# Patient Record
Sex: MALE | Race: WHITE | NOT HISPANIC OR LATINO | Employment: OTHER | URBAN - METROPOLITAN AREA
[De-identification: names, ages, dates, MRNs, and addresses within clinical notes are randomized per-mention and may not be internally consistent; named-entity substitution may affect disease eponyms.]

---

## 2017-05-02 ENCOUNTER — HOSPITAL ENCOUNTER (OUTPATIENT)
Dept: LAB | Facility: MEDICAL CENTER | Age: 62
End: 2017-05-02
Attending: FAMILY MEDICINE
Payer: COMMERCIAL

## 2017-05-02 LAB
25(OH)D3 SERPL-MCNC: 40 NG/ML (ref 30–100)
ALBUMIN SERPL BCP-MCNC: 4.3 G/DL (ref 3.2–4.9)
ALBUMIN/GLOB SERPL: 1.5 G/DL
ALP SERPL-CCNC: 51 U/L (ref 30–99)
ALT SERPL-CCNC: 41 U/L (ref 2–50)
ANION GAP SERPL CALC-SCNC: 8 MMOL/L (ref 0–11.9)
AST SERPL-CCNC: 31 U/L (ref 12–45)
BILIRUB SERPL-MCNC: 0.6 MG/DL (ref 0.1–1.5)
BUN SERPL-MCNC: 16 MG/DL (ref 8–22)
CALCIUM SERPL-MCNC: 9.3 MG/DL (ref 8.5–10.5)
CHLORIDE SERPL-SCNC: 111 MMOL/L (ref 96–112)
CHOLEST SERPL-MCNC: 152 MG/DL (ref 100–199)
CO2 SERPL-SCNC: 22 MMOL/L (ref 20–33)
CREAT SERPL-MCNC: 0.88 MG/DL (ref 0.5–1.4)
GFR SERPL CREATININE-BSD FRML MDRD: >60 ML/MIN/1.73 M 2
GLOBULIN SER CALC-MCNC: 2.8 G/DL (ref 1.9–3.5)
GLUCOSE SERPL-MCNC: 120 MG/DL (ref 65–99)
HDLC SERPL-MCNC: 38 MG/DL
LDLC SERPL CALC-MCNC: 96 MG/DL
POTASSIUM SERPL-SCNC: 3.7 MMOL/L (ref 3.6–5.5)
PROT SERPL-MCNC: 7.1 G/DL (ref 6–8.2)
PSA SERPL-MCNC: 0.45 NG/ML (ref 0–4)
SODIUM SERPL-SCNC: 141 MMOL/L (ref 135–145)
TRIGL SERPL-MCNC: 90 MG/DL (ref 0–149)
TSH SERPL DL<=0.005 MIU/L-ACNC: 7.3 UIU/ML (ref 0.3–3.7)

## 2017-05-02 PROCEDURE — 84443 ASSAY THYROID STIM HORMONE: CPT

## 2017-05-02 PROCEDURE — 36415 COLL VENOUS BLD VENIPUNCTURE: CPT

## 2017-05-02 PROCEDURE — 80061 LIPID PANEL: CPT

## 2017-05-02 PROCEDURE — 80053 COMPREHEN METABOLIC PANEL: CPT

## 2017-05-02 PROCEDURE — 82306 VITAMIN D 25 HYDROXY: CPT

## 2017-05-02 PROCEDURE — 84153 ASSAY OF PSA TOTAL: CPT

## 2017-06-06 ENCOUNTER — OFFICE VISIT (OUTPATIENT)
Dept: CARDIOLOGY | Facility: MEDICAL CENTER | Age: 62
End: 2017-06-06
Payer: COMMERCIAL

## 2017-06-06 VITALS
WEIGHT: 255 LBS | OXYGEN SATURATION: 93 % | HEIGHT: 70 IN | DIASTOLIC BLOOD PRESSURE: 80 MMHG | SYSTOLIC BLOOD PRESSURE: 120 MMHG | BODY MASS INDEX: 36.51 KG/M2 | HEART RATE: 70 BPM

## 2017-06-06 DIAGNOSIS — E88.810 DYSMETABOLIC SYNDROME X: ICD-10-CM

## 2017-06-06 DIAGNOSIS — E66.09 NON MORBID OBESITY DUE TO EXCESS CALORIES: ICD-10-CM

## 2017-06-06 DIAGNOSIS — E78.2 MIXED HYPERLIPIDEMIA: ICD-10-CM

## 2017-06-06 DIAGNOSIS — I48.0 PAF (PAROXYSMAL ATRIAL FIBRILLATION) (HCC): Primary | ICD-10-CM

## 2017-06-06 PROCEDURE — 99213 OFFICE O/P EST LOW 20 MIN: CPT | Performed by: INTERNAL MEDICINE

## 2017-06-06 RX ORDER — LEVOTHYROXINE SODIUM 112 UG/1
112 TABLET ORAL
COMMUNITY
End: 2018-07-20

## 2017-06-06 RX ORDER — VITAMIN E 268 MG
400 CAPSULE ORAL DAILY
Status: ON HOLD | COMMUNITY
End: 2022-12-27

## 2017-06-06 ASSESSMENT — ENCOUNTER SYMPTOMS: PALPITATIONS: 1

## 2017-06-06 NOTE — MR AVS SNAPSHOT
"        Michael Villarreal   2017 9:00 AM   Office Visit   MRN: [id number]    Department:  [facility]   Dept Phone:  [phone]    Description:  Male : 1955   Provider:  Salomon Mendez M.D.           Reason for Visit     Follow-Up           Allergies as of 2017     No Known Allergies      You were diagnosed with     PAF (paroxysmal atrial fibrillation) (CMS-HCC)   [257733]       Dysmetabolic syndrome X   [277.7.ICD-9-CM]         Vital Signs     Blood Pressure Pulse Height Weight Body Mass Index Oxygen Saturation    120/80 mmHg 70 1.778 m (5' 10\") 115.667 kg (255 lb) 36.59 kg/m2 93%    Smoking Status                   Former Smoker           Basic Information     Date Of Birth Sex Race Ethnicity Preferred Language    1955 Male White Non- English      Problem List              ICD-10-CM Priority Class Noted - Resolved    Hyperlipidemia E78.5   2013 - Present    Senile nuclear sclerosis H25.10   2015 - Present    Mechanical complication due to ocular lens prosthesis T85.29XA   2015 - Present    Dysmetabolic syndrome X E88.81   2015 - Present    PAF (paroxysmal atrial fibrillation) (CMS-HCC) I48.0   2016 - Present      Health Maintenance        Date Due Completion Dates    IMM DTaP/Tdap/Td Vaccine (1 - Tdap) 1974 ---    COLONOSCOPY 2005 ---    IMM ZOSTER VACCINE 2015 ---            Current Immunizations     No immunizations on file.      Below and/or attached are the medications your provider expects you to take. Review all of your home medications and newly ordered medications with your provider and/or pharmacist. Follow medication instructions as directed by your provider and/or pharmacist. Please keep your medication list with you and share with your provider. Update the information when medications are discontinued, doses are changed, or new medications (including over-the-counter products) are added; and carry medication information at all times " in the event of emergency situations     Allergies:  No Known Allergies          Medications  Valid as of: June 06, 2017 -  9:19 AM    Generic Name Brand Name Tablet Size Instructions for use    Aspirin (Tab) aspirin 81 MG Take 81 mg by mouth every day.        B Complex-C   Take 1 Tab by mouth every day.        Cholecalciferol (Tab) cholecalciferol 1000 UNIT Take 1,000 Units by mouth every day.        Coconut Oil   Take  by mouth.        Coenzyme Q10 (Cap) Co Q 10 100 MG Take 1 Tab by mouth every day.        Fenofibrate Micronized (Cap) ANTARA 130 MG         Levothyroxine Sodium (Tab) SYNTHROID 50 MCG TAKE ONE TABLET BY MOUTH DAILY        Levothyroxine Sodium (Tab) SYNTHROID 112 MCG Take 112 mcg by mouth Every morning on an empty stomach.        Magnesium Gluconate (Tab) MAG-G 500 MG Take 500 mg by mouth every day.        Metoprolol Tartrate (Tab) LOPRESSOR 25 MG TAKE 1 TABLET BY MOUTH 2 TIMES A DAY        Potassium Gluconate (Tab) Potassium Gluconate 595 (99 K) MG Take  by mouth every day.        Pravastatin Sodium (Tab) PRAVACHOL 40 MG Take 40 mg by mouth every evening.        Propafenone HCl (Tab) RYTHMOL 300 MG Take 1 Tab by mouth every 8 hours.        Vitamin E (Cap) VITAMIN E 400 UNIT Take 400 Units by mouth every day.        .                 Medicines prescribed today were sent to:     [id number] - [place] - [address]    [address] [place] [address]    Phone: [phone] Fax: [phone]    Open 24 Hours?: No      Medication refill instructions:       If your prescription bottle indicates you have medication refills left, it is not necessary to call your provider’s office. Please contact your pharmacy and they will refill your medication.    If your prescription bottle indicates you do not have any refills left, you may request refills at any time through one of the following ways: The online [employer] system (except Urgent Care), by calling your provider’s office, or by asking your  pharmacy to contact your provider’s office with a refill request. Medication refills are processed only during regular business hours and may not be available until the next business day. Your provider may request additional information or to have a follow-up visit with you prior to refilling your medication.   *Please Note: Medication refills are assigned a new Rx number when refilled electronically. Your pharmacy may indicate that no refills were authorized even though a new prescription for the same medication is available at the pharmacy. Please request the medicine by name with the pharmacy before contacting your provider for a refill.           [employer] Access Code: Activation code not generated  Current SeeMore Interactive Status: Active

## 2017-06-06 NOTE — PROGRESS NOTES
Subjective:   Michael Villarreal is a 62 -year-old man with a history of paroxysmal atrial fibrillation, obesity, hypertension, and dyslipidemia.     He is doing very well today, and has no cardiovascular complaints. He tells me that he has had to take about 2 pills of Rythmol in the last year or so for palpitations. He has no chest discomfort whatsoever with moderately vigorous physical activity that he does on a semi-regular basis. He is doing well with his medical regimen.    He tells me as far as diet goes that he has switched to a diet called the keto, which is similar to the Atkins diet.    He comes in today in the recent aftermath of the passing of his mother-in-law who had been suffering in the ICU for about 30 days. He seems at peace with that.    Past Medical History   Diagnosis Date   • Hyperlipidemia    • Unspecified cataract    • High cholesterol    • Atrial fibrillation (CMS-HCC)      approx 1yr ago-     Past Surgical History   Procedure Laterality Date   • Dupuytren contracture release  7/17/2012     Performed by RAFAEL CALVILLO at SURGERY [facility] ORS   • Hand surgery     • Cataract phaco with iol  4/1/2015     Performed by Jakob Garcia M.D. at SURGERY SAME DAY [facility] ORS   • Cataract phaco with iol  4/20/2015     Performed by Jakob Garcia M.D. at SURGERY [facility] ORS     Family History   Problem Relation Age of Onset   • Heart Disease Neg Hx    • Other Mother 68     kidney CA     History   Smoking status   • Former Smoker -- 5 years   • Types: Cigarettes   • Quit date: 07/17/1984   Smokeless tobacco   • Former User   • Types: Chew   • Quit date: 07/17/1984     No Known Allergies  Outpatient Encounter Prescriptions as of 6/6/2017   Medication Sig Dispense Refill   • levothyroxine (SYNTHROID) 112 MCG Tab Take 112 mcg by mouth Every morning on an empty stomach.     • COCONUT OIL PO Take  by mouth.     • vitamin e (VITAMIN E) 400 UNIT Cap Take 400 Units by mouth every day.     •  "metoprolol (LOPRESSOR) 25 MG Tab TAKE 1 TABLET BY MOUTH 2 TIMES A  Tab 3   • propafenone (RYTHMOL) 300 MG tablet Take 1 Tab by mouth every 8 hours. 10 Tab 11   • fenofibrate (ANTARA) 130 MG capsule   0   • pravastatin (PRAVACHOL) 40 MG tablet Take 40 mg by mouth every evening.     • Coenzyme Q10 (CO Q 10) 100 MG CAPS Take 1 Tab by mouth every day.     • B Complex-C (SUPER B COMPLEX PO) Take 1 Tab by mouth every day.     • aspirin 81 MG tablet Take 81 mg by mouth every day.     • vitamin D (CHOLECALCIFEROL) 1000 UNIT TABS Take 1,000 Units by mouth every day.     • levothyroxine (SYNTHROID) 50 MCG Tab TAKE ONE TABLET BY MOUTH DAILY  0   • Potassium Gluconate (HM POTASSIUM) 595 MG TABS Take  by mouth every day.     • magnesium gluconate (MAG-G) 500 MG tablet Take 500 mg by mouth every day.       No facility-administered encounter medications on file as of 6/6/2017.     Review of Systems   Cardiovascular: Positive for palpitations (2 episodes in the last year).   All other systems reviewed and are negative.       Objective:   /80 mmHg  Pulse 70  Ht 1.778 m (5' 10\")  Wt 115.667 kg (255 lb)  BMI 36.59 kg/m2  SpO2 93%    Physical Exam   Constitutional: He is oriented to person, place, and time. He appears well-developed and well-nourished. No distress.   Pleasant, middle-age man in no distress   HENT:   Head: Normocephalic and atraumatic.   Eyes: Conjunctivae and EOM are normal. Pupils are equal, round, and reactive to light. No scleral icterus.   Neck: Neck supple. No JVD present. No tracheal deviation present.   Cardiovascular: Normal rate, regular rhythm, normal heart sounds and intact distal pulses.  Exam reveals no gallop and no friction rub.    No murmur heard.  Pulses:       Dorsalis pedis pulses are 2+ on the right side, and 2+ on the left side.   No carotid bruits   Pulmonary/Chest: Effort normal and breath sounds normal. No stridor. No respiratory distress. He has no wheezes. He has no rales. "   Abdominal: Soft. Bowel sounds are normal. He exhibits no distension.   Musculoskeletal: He exhibits no edema.   Neurological: He is alert and oriented to person, place, and time.   Skin: Skin is warm and dry. No rash noted. He is not diaphoretic. No erythema. No pallor.   Psychiatric: He has a normal mood and affect. Judgment and thought content normal.   Vitals reviewed.       5/2/2017 06:13   Cholesterol,Tot 152   Triglycerides 90   HDL 38 (A)   LDL 96         Assessment:     1. PAF (paroxysmal atrial fibrillation) (CMS-HCC)     2. Dysmetabolic syndrome X     3. Mixed hyperlipidemia     4. Non morbid obesity due to excess calories         Medical Decision Making:  Today's Assessment / Status / Plan:     Medical Decision Making:    He is doing very well today with really no cardiovascular complaints. His blood pressure is wonderfully controlled as usual on metoprolol. I reviewed with him his lipids, and I would not change his lipid regimen resolutely. He requires very infrequent Rythmol for paroxysmal atrial fibrillation. We discussed diet, and exercise. I again reviewed with him stroke risk with atrial fibrillation, and we have opted for aspirin at this time.    Salomon Mendez MD  Cardiologist, [facility]

## 2017-06-06 NOTE — Clinical Note
Name:          Michael Villarreal   YOB: 1955  Date:     6/6/2017      Robosn Ramos, PRECIOUS.  [address]  [place] [address]     Salomon Mendez MD  [address]  [place] [address]  Phone: [phone]  Back Line: [phone]  Fax: [phone]  E-mail: [email or web address]   Dear Dr. Ramos,    We had the pleasure of seeing your patient, Michael Villarreal, in Cardiology Clinic at [facility] today.    As you know, he is a 62-year-old man with a history of paroxysmal atrial fibrillation, obesity, hypertension, and dyslipidemia.    He is doing very well today with really no cardiovascular complaints. His blood pressure is wonderfully controlled as usual on metoprolol. I reviewed with him his lipids, and I would not change his lipid regimen resolutely. He requires very infrequent Rythmol for paroxysmal atrial fibrillation. We discussed diet, and exercise. I again reviewed with him stroke risk with atrial fibrillation, and we have opted for aspirin at this time.    We will have the patient follow-up in one year.    Thank you for the referral and please do not hesitate to contact me at any time. My contact information is listed above.    This note was dictated using Dragon speech recognition software.     A full note including my physical examination and a full list of rectified medications is available in our medical record, and can be faxed as well.    Salomon Mendez MD  Cardiologist  [facility]

## 2017-06-22 DIAGNOSIS — I10 ESSENTIAL HYPERTENSION: ICD-10-CM

## 2017-07-27 ENCOUNTER — HOSPITAL ENCOUNTER (OUTPATIENT)
Dept: LAB | Facility: MEDICAL CENTER | Age: 62
End: 2017-07-27
Attending: FAMILY MEDICINE
Payer: COMMERCIAL

## 2017-07-27 ENCOUNTER — HOSPITAL ENCOUNTER (OUTPATIENT)
Dept: LAB | Facility: MEDICAL CENTER | Age: 62
End: 2017-07-27
Payer: COMMERCIAL

## 2017-07-27 LAB
BDY FAT % MEASURED: 35.5 %
BP DIAS: 80 MMHG
BP SYS: 120 MMHG
CHOLEST SERPL-MCNC: 155 MG/DL (ref 100–199)
DIABETES HTDIA: NO
EVENT NAME HTEVT: NORMAL
FASTING HTFAS: YES
GLUCOSE SERPL-MCNC: 106 MG/DL (ref 65–99)
HDLC SERPL-MCNC: 39 MG/DL
HYPERTENSION HTHYP: NO
LDLC SERPL CALC-MCNC: 96 MG/DL
SCREENING LOC CITY HTCIT: NORMAL
SCREENING LOC STATE HTSTA: NORMAL
SCREENING LOCATION HTLOC: NORMAL
SMOKING HTSMO: NO
SUBSCRIBER ID HTSID: NORMAL
TRIGL SERPL-MCNC: 101 MG/DL (ref 0–149)
TSH SERPL DL<=0.005 MIU/L-ACNC: 1.99 UIU/ML (ref 0.3–3.7)

## 2017-07-27 PROCEDURE — 84443 ASSAY THYROID STIM HORMONE: CPT

## 2017-07-27 PROCEDURE — 80061 LIPID PANEL: CPT

## 2017-07-27 PROCEDURE — S5190 WELLNESS ASSESSMENT BY NONPH: HCPCS

## 2017-07-27 PROCEDURE — 82947 ASSAY GLUCOSE BLOOD QUANT: CPT

## 2017-07-27 PROCEDURE — 36415 COLL VENOUS BLD VENIPUNCTURE: CPT

## 2017-08-27 DIAGNOSIS — I48.0 PAROXYSMAL ATRIAL FIBRILLATION (HCC): ICD-10-CM

## 2017-08-29 RX ORDER — PROPAFENONE HYDROCHLORIDE 300 MG/1
300 TABLET, COATED ORAL EVERY 8 HOURS
Qty: 270 TAB | Refills: 3 | Status: SHIPPED | OUTPATIENT
Start: 2017-08-29 | End: 2019-01-18 | Stop reason: SDUPTHER

## 2017-10-13 ENCOUNTER — HOSPITAL ENCOUNTER (OUTPATIENT)
Dept: LAB | Facility: MEDICAL CENTER | Age: 62
End: 2017-10-13
Attending: FAMILY MEDICINE
Payer: COMMERCIAL

## 2017-10-13 LAB — GFR SERPL CREATININE-BSD FRML MDRD: >60 ML/MIN/1.73 M 2

## 2017-10-13 PROCEDURE — 80061 LIPID PANEL: CPT

## 2017-10-13 PROCEDURE — 36415 COLL VENOUS BLD VENIPUNCTURE: CPT

## 2017-10-13 PROCEDURE — 80053 COMPREHEN METABOLIC PANEL: CPT

## 2017-10-13 PROCEDURE — 82306 VITAMIN D 25 HYDROXY: CPT

## 2017-10-13 PROCEDURE — 84443 ASSAY THYROID STIM HORMONE: CPT

## 2017-10-13 PROCEDURE — 84153 ASSAY OF PSA TOTAL: CPT

## 2017-10-13 PROCEDURE — 85025 COMPLETE CBC W/AUTO DIFF WBC: CPT

## 2017-10-14 LAB
25(OH)D3 SERPL-MCNC: 35 NG/ML (ref 30–100)
ALBUMIN SERPL BCP-MCNC: 4.4 G/DL (ref 3.2–4.9)
ALBUMIN/GLOB SERPL: 1.7 G/DL
ALP SERPL-CCNC: 46 U/L (ref 30–99)
ALT SERPL-CCNC: 29 U/L (ref 2–50)
ANION GAP SERPL CALC-SCNC: 7 MMOL/L (ref 0–11.9)
AST SERPL-CCNC: 25 U/L (ref 12–45)
BASOPHILS # BLD AUTO: 1.5 % (ref 0–1.8)
BASOPHILS # BLD: 0.09 K/UL (ref 0–0.12)
BILIRUB SERPL-MCNC: 0.6 MG/DL (ref 0.1–1.5)
BUN SERPL-MCNC: 15 MG/DL (ref 8–22)
CALCIUM SERPL-MCNC: 9.7 MG/DL (ref 8.5–10.5)
CHLORIDE SERPL-SCNC: 109 MMOL/L (ref 96–112)
CHOLEST SERPL-MCNC: 155 MG/DL (ref 100–199)
CO2 SERPL-SCNC: 27 MMOL/L (ref 20–33)
CREAT SERPL-MCNC: 0.82 MG/DL (ref 0.5–1.4)
EOSINOPHIL # BLD AUTO: 0.13 K/UL (ref 0–0.51)
EOSINOPHIL NFR BLD: 2.2 % (ref 0–6.9)
ERYTHROCYTE [DISTWIDTH] IN BLOOD BY AUTOMATED COUNT: 43.3 FL (ref 35.9–50)
GLOBULIN SER CALC-MCNC: 2.6 G/DL (ref 1.9–3.5)
GLUCOSE SERPL-MCNC: 103 MG/DL (ref 65–99)
HCT VFR BLD AUTO: 47.4 % (ref 42–52)
HDLC SERPL-MCNC: 42 MG/DL
HGB BLD-MCNC: 16.1 G/DL (ref 14–18)
IMM GRANULOCYTES # BLD AUTO: 0.07 K/UL (ref 0–0.11)
IMM GRANULOCYTES NFR BLD AUTO: 1.2 % (ref 0–0.9)
LDLC SERPL CALC-MCNC: 91 MG/DL
LYMPHOCYTES # BLD AUTO: 1.8 K/UL (ref 1–4.8)
LYMPHOCYTES NFR BLD: 30.3 % (ref 22–41)
MCH RBC QN AUTO: 30.9 PG (ref 27–33)
MCHC RBC AUTO-ENTMCNC: 34 G/DL (ref 33.7–35.3)
MCV RBC AUTO: 91 FL (ref 81.4–97.8)
MONOCYTES # BLD AUTO: 0.44 K/UL (ref 0–0.85)
MONOCYTES NFR BLD AUTO: 7.4 % (ref 0–13.4)
NEUTROPHILS # BLD AUTO: 3.42 K/UL (ref 1.82–7.42)
NEUTROPHILS NFR BLD: 57.4 % (ref 44–72)
NRBC # BLD AUTO: 0 K/UL
NRBC BLD AUTO-RTO: 0 /100 WBC
PLATELET # BLD AUTO: 179 K/UL (ref 164–446)
PMV BLD AUTO: 13.2 FL (ref 9–12.9)
POTASSIUM SERPL-SCNC: 3.6 MMOL/L (ref 3.6–5.5)
PROT SERPL-MCNC: 7 G/DL (ref 6–8.2)
PSA SERPL-MCNC: 0.59 NG/ML (ref 0–4)
RBC # BLD AUTO: 5.21 M/UL (ref 4.7–6.1)
SODIUM SERPL-SCNC: 143 MMOL/L (ref 135–145)
TRIGL SERPL-MCNC: 109 MG/DL (ref 0–149)
TSH SERPL DL<=0.005 MIU/L-ACNC: 4.03 UIU/ML (ref 0.3–3.7)
WBC # BLD AUTO: 6 K/UL (ref 4.8–10.8)

## 2018-02-13 ENCOUNTER — HOSPITAL ENCOUNTER (OUTPATIENT)
Dept: LAB | Facility: MEDICAL CENTER | Age: 63
End: 2018-02-13
Attending: FAMILY MEDICINE
Payer: COMMERCIAL

## 2018-02-13 LAB
EST. AVERAGE GLUCOSE BLD GHB EST-MCNC: 111 MG/DL
HBA1C MFR BLD: 5.5 % (ref 0–5.6)
TSH SERPL DL<=0.005 MIU/L-ACNC: 2.46 UIU/ML (ref 0.38–5.33)

## 2018-02-13 PROCEDURE — 83036 HEMOGLOBIN GLYCOSYLATED A1C: CPT

## 2018-02-13 PROCEDURE — 36415 COLL VENOUS BLD VENIPUNCTURE: CPT

## 2018-02-13 PROCEDURE — 84443 ASSAY THYROID STIM HORMONE: CPT

## 2018-02-13 PROCEDURE — 80048 BASIC METABOLIC PNL TOTAL CA: CPT

## 2018-02-14 LAB
ANION GAP SERPL CALC-SCNC: 9 MMOL/L (ref 0–11.9)
BUN SERPL-MCNC: 19 MG/DL (ref 8–22)
CALCIUM SERPL-MCNC: 9.4 MG/DL (ref 8.5–10.5)
CHLORIDE SERPL-SCNC: 107 MMOL/L (ref 96–112)
CO2 SERPL-SCNC: 26 MMOL/L (ref 20–33)
CREAT SERPL-MCNC: 0.86 MG/DL (ref 0.5–1.4)
GLUCOSE SERPL-MCNC: 93 MG/DL (ref 65–99)
POTASSIUM SERPL-SCNC: 3.6 MMOL/L (ref 3.6–5.5)
SODIUM SERPL-SCNC: 142 MMOL/L (ref 135–145)

## 2018-05-25 ENCOUNTER — HOSPITAL ENCOUNTER (OUTPATIENT)
Dept: LAB | Facility: MEDICAL CENTER | Age: 63
End: 2018-05-25
Attending: FAMILY MEDICINE
Payer: COMMERCIAL

## 2018-05-25 LAB
ALBUMIN SERPL BCP-MCNC: 4.2 G/DL (ref 3.2–4.9)
ALBUMIN/GLOB SERPL: 1.4 G/DL
ALP SERPL-CCNC: 44 U/L (ref 30–99)
ALT SERPL-CCNC: 31 U/L (ref 2–50)
ANION GAP SERPL CALC-SCNC: 7 MMOL/L (ref 0–11.9)
AST SERPL-CCNC: 24 U/L (ref 12–45)
BILIRUB SERPL-MCNC: 0.6 MG/DL (ref 0.1–1.5)
BUN SERPL-MCNC: 20 MG/DL (ref 8–22)
CALCIUM SERPL-MCNC: 9.5 MG/DL (ref 8.5–10.5)
CHLORIDE SERPL-SCNC: 108 MMOL/L (ref 96–112)
CHOLEST SERPL-MCNC: 154 MG/DL (ref 100–199)
CO2 SERPL-SCNC: 26 MMOL/L (ref 20–33)
CREAT SERPL-MCNC: 0.81 MG/DL (ref 0.5–1.4)
EST. AVERAGE GLUCOSE BLD GHB EST-MCNC: 117 MG/DL
GLOBULIN SER CALC-MCNC: 2.9 G/DL (ref 1.9–3.5)
GLUCOSE SERPL-MCNC: 104 MG/DL (ref 65–99)
HBA1C MFR BLD: 5.7 % (ref 0–5.6)
HDLC SERPL-MCNC: 37 MG/DL
LDLC SERPL CALC-MCNC: 79 MG/DL
POTASSIUM SERPL-SCNC: 3.9 MMOL/L (ref 3.6–5.5)
PROT SERPL-MCNC: 7.1 G/DL (ref 6–8.2)
SODIUM SERPL-SCNC: 141 MMOL/L (ref 135–145)
TRIGL SERPL-MCNC: 190 MG/DL (ref 0–149)
TSH SERPL DL<=0.005 MIU/L-ACNC: 2.3 UIU/ML (ref 0.38–5.33)

## 2018-05-25 PROCEDURE — 80053 COMPREHEN METABOLIC PANEL: CPT

## 2018-05-25 PROCEDURE — 36415 COLL VENOUS BLD VENIPUNCTURE: CPT

## 2018-05-25 PROCEDURE — 80061 LIPID PANEL: CPT

## 2018-05-25 PROCEDURE — 84443 ASSAY THYROID STIM HORMONE: CPT

## 2018-05-25 PROCEDURE — 83036 HEMOGLOBIN GLYCOSYLATED A1C: CPT

## 2018-07-01 DIAGNOSIS — I10 ESSENTIAL HYPERTENSION: ICD-10-CM

## 2018-07-20 ENCOUNTER — OFFICE VISIT (OUTPATIENT)
Dept: CARDIOLOGY | Facility: MEDICAL CENTER | Age: 63
End: 2018-07-20
Payer: COMMERCIAL

## 2018-07-20 VITALS
HEIGHT: 70 IN | DIASTOLIC BLOOD PRESSURE: 72 MMHG | WEIGHT: 272 LBS | OXYGEN SATURATION: 93 % | SYSTOLIC BLOOD PRESSURE: 124 MMHG | HEART RATE: 70 BPM | BODY MASS INDEX: 38.94 KG/M2

## 2018-07-20 DIAGNOSIS — I48.0 PAROXYSMAL ATRIAL FIBRILLATION (HCC): ICD-10-CM

## 2018-07-20 DIAGNOSIS — R07.89 OTHER CHEST PAIN: ICD-10-CM

## 2018-07-20 DIAGNOSIS — E78.2 MIXED HYPERLIPIDEMIA: ICD-10-CM

## 2018-07-20 DIAGNOSIS — E88.810 DYSMETABOLIC SYNDROME X: ICD-10-CM

## 2018-07-20 DIAGNOSIS — E66.09 NON MORBID OBESITY DUE TO EXCESS CALORIES: ICD-10-CM

## 2018-07-20 DIAGNOSIS — I48.0 PAF (PAROXYSMAL ATRIAL FIBRILLATION) (HCC): ICD-10-CM

## 2018-07-20 DIAGNOSIS — I10 ESSENTIAL HYPERTENSION: ICD-10-CM

## 2018-07-20 PROCEDURE — 99214 OFFICE O/P EST MOD 30 MIN: CPT | Performed by: NURSE PRACTITIONER

## 2018-07-20 RX ORDER — LEVOTHYROXINE SODIUM 0.15 MG/1
TABLET ORAL
COMMUNITY
Start: 2018-07-19 | End: 2022-06-09

## 2018-07-20 ASSESSMENT — ENCOUNTER SYMPTOMS
ABDOMINAL PAIN: 0
CLAUDICATION: 0
FEVER: 0
PND: 0
MYALGIAS: 0
COUGH: 0
SHORTNESS OF BREATH: 0
ORTHOPNEA: 0
PALPITATIONS: 0

## 2018-07-20 NOTE — LETTER
[facility]   [address]  [place] [address]  Phone: [phone]  Fax: [phone]              Michael Villarreal  1955    Encounter Date: 7/20/2018    PATRICIA Morfin          PROGRESS NOTE:  Chief Complaint   Patient presents with   • Atrial Fibrillation     Subjective:   Michael Villarreal is a 63 y.o. male who presents today for follow up on chest tightness.    He is a patient of Dr. Salomon Mendez in our office. Hx of PAF, HLD, HTN, obesity, and now new onset of chest tightness.    He tells me that since his last apt, he has had about a dozen times of chest tightness that is central with radiation to his neck, more at rest.     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     He is overweight. He states he eats healthy.    He has mild LE edema.    Past Medical History:   Diagnosis Date   • Atrial fibrillation (HCC)     approx 1yr ago-   • High cholesterol    • Hyperlipidemia    • Hypertension    • Obesity    • Unspecified cataract      Past Surgical History:   Procedure Laterality Date   • CATARACT PHACO WITH IOL  4/20/2015    Performed by Jakob Garcia M.D. at SURGERY [facility] ORS   • CATARACT PHACO WITH IOL  4/1/2015    Performed by Jakob Garcia M.D. at SURGERY SAME DAY [facility] ORS   • DUPUYTREN CONTRACTURE RELEASE  7/17/2012    Performed by RAFAEL CALVILLO at SURGERY [facility] ORS   • HAND SURGERY       Family History   Problem Relation Age of Onset   • Other Mother 68     kidney CA   • Heart Disease Neg Hx      Social History     Social History   • Marital status:      Spouse name: N/A   • Number of children: N/A   • Years of education: N/A     Occupational History   • Not on file.     Social History Main Topics   • Smoking status: Former Smoker     Years: 5.00     Types: Cigarettes     Quit date: 7/17/1984   • Smokeless tobacco: Former User     Types: Chew     Quit date: 7/17/1984   •  Alcohol use Yes      Comment: Occasionally   • Drug use: No   • Sexual activity: Not on file     Other Topics Concern   • Not on file     Social History Narrative   • No narrative on file     No Known Allergies  Outpatient Encounter Prescriptions as of 7/20/2018   Medication Sig Dispense Refill   • levothyroxine (SYNTHROID) 150 MCG Tab      • apixaban (ELIQUIS) 5mg Tab Take 1 Tab by mouth 2 Times a Day. 60 Tab 11   • metoprolol (LOPRESSOR) 25 MG Tab Take 1 Tab by mouth 2 times a day. 180 Tab 3   • propafenone (RYTHMOL) 300 MG tablet TAKE 1 TAB BY MOUTH EVERY 8 HOURS. 270 Tab 3   • COCONUT OIL PO Take  by mouth.     • vitamin e (VITAMIN E) 400 UNIT Cap Take 400 Units by mouth every day.     • fenofibrate (ANTARA) 130 MG capsule   0   • pravastatin (PRAVACHOL) 40 MG tablet Take 40 mg by mouth every evening.     • Coenzyme Q10 (CO Q 10) 100 MG CAPS Take 1 Tab by mouth every day.     • B Complex-C (SUPER B COMPLEX PO) Take 1 Tab by mouth every day.     • vitamin D (CHOLECALCIFEROL) 1000 UNIT TABS Take 1,000 Units by mouth every day.     • [DISCONTINUED] metoprolol (LOPRESSOR) 25 MG Tab TAKE 1 TAB BY MOUTH 2 TIMES A DAY. 180 Tab 0   • [DISCONTINUED] levothyroxine (SYNTHROID) 112 MCG Tab Take 112 mcg by mouth Every morning on an empty stomach.     • [DISCONTINUED] levothyroxine (SYNTHROID) 50 MCG Tab TAKE ONE TABLET BY MOUTH DAILY  0   • [DISCONTINUED] Potassium Gluconate (HM POTASSIUM) 595 MG TABS Take  by mouth every day.     • [DISCONTINUED] magnesium gluconate (MAG-G) 500 MG tablet Take 500 mg by mouth every day.     • [DISCONTINUED] aspirin 81 MG tablet Take 81 mg by mouth every day.       No facility-administered encounter medications on file as of 7/20/2018.      Review of Systems   Constitutional: Negative for fever and malaise/fatigue.   Respiratory: Negative for cough and shortness of breath.    Cardiovascular: Positive for chest pain. Negative for palpitations, orthopnea, claudication, leg swelling and PND.      "   Chest tightness that radiates into neck   Gastrointestinal: Negative for abdominal pain.   Musculoskeletal: Negative for myalgias.        Objective:   /72   Pulse 70   Ht 1.778 m (5' 10\")   Wt 123.4 kg (272 lb)   SpO2 93%   BMI 39.03 kg/m²      Physical Exam   Constitutional: He appears well-developed and well-nourished.   obese   HENT:   Head: Normocephalic and atraumatic.   Eyes: EOM are normal.   Neck: No JVD present.   Cardiovascular: Normal rate, regular rhythm, normal heart sounds and intact distal pulses.    Pulmonary/Chest: Effort normal and breath sounds normal.   Musculoskeletal: Normal range of motion. He exhibits no edema.   Skin: Skin is warm and dry.   Psychiatric: He has a normal mood and affect.   Nursing note and vitals reviewed.      Assessment:     1. Dysmetabolic syndrome X  NM-CARDIAC PET   2. Mixed hyperlipidemia  NM-CARDIAC PET   3. Non morbid obesity due to excess calories  NM-CARDIAC PET   4. PAF (paroxysmal atrial fibrillation) (HCC)     5. Other chest pain  NM-CARDIAC PET   6. Paroxysmal atrial fibrillation (HCC)     7. Essential hypertension  metoprolol (LOPRESSOR) 25 MG Tab     Medical Decision Making:  Today's Assessment / Status / Plan:     1. Chest tightness   -check PET for ischemia, concern for multiple risk factors for CAD  -cont cholesterol management with PCP  -call with results    2. HLD  -statin and fibrate  -LDL goal <100    3. HTN  -good control on metoprolol only    4. PAF  -concern for chest tightness related to afib  -rythmol only PRN for palpitations, he hasn't taken this  -cont metoprolol and eliquis  -tolerating eliquis with no bleeding    5. Obesity with hyperglycemia  -recommended lifestyle changes with more exercise and watching his calories, he agrees  -no resources wanted at this time    FU in clinic in 4 months with IA with review of symptoms, call for worsening symptoms    Patient verbalizes understanding and agrees with the plan of care.     "     Collaborating MD: Eden LARA        No Recipients

## 2018-07-20 NOTE — PROGRESS NOTES
Chief Complaint   Patient presents with   • Atrial Fibrillation     Subjective:   Michael Villarreal is a 63 y.o. male who presents today for follow up on chest tightness.    He is a patient of Dr. Salomon Mendez in our office. Hx of PAF, HLD, HTN, obesity, and now new onset of chest tightness.    He tells me that since his last apt, he has had about a dozen times of chest tightness that is central with radiation to his neck, more at rest.     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     He is overweight. He states he eats healthy.    He has mild LE edema.    Past Medical History:   Diagnosis Date   • Atrial fibrillation (HCC)     approx 1yr ago-   • High cholesterol    • Hyperlipidemia    • Hypertension    • Obesity    • Unspecified cataract      Past Surgical History:   Procedure Laterality Date   • CATARACT PHACO WITH IOL  4/20/2015    Performed by Jakob Garcia M.D. at SURGERY [facility] ORS   • CATARACT PHACO WITH IOL  4/1/2015    Performed by Jakob Garcia M.D. at SURGERY SAME DAY [facility] ORS   • DUPUYTREN CONTRACTURE RELEASE  7/17/2012    Performed by RAFAEL CALVILLO at SURGERY [facility] ORS   • HAND SURGERY       Family History   Problem Relation Age of Onset   • Other Mother 68     kidney CA   • Heart Disease Neg Hx      Social History     Social History   • Marital status:      Spouse name: N/A   • Number of children: N/A   • Years of education: N/A     Occupational History   • Not on file.     Social History Main Topics   • Smoking status: Former Smoker     Years: 5.00     Types: Cigarettes     Quit date: 7/17/1984   • Smokeless tobacco: Former User     Types: Chew     Quit date: 7/17/1984   • Alcohol use Yes      Comment: Occasionally   • Drug use: No   • Sexual activity: Not on file     Other Topics Concern   • Not on file     Social History Narrative   • No narrative on file     No Known Allergies  Outpatient Encounter Prescriptions as of 7/20/2018   Medication  "Sig Dispense Refill   • levothyroxine (SYNTHROID) 150 MCG Tab      • apixaban (ELIQUIS) 5mg Tab Take 1 Tab by mouth 2 Times a Day. 60 Tab 11   • metoprolol (LOPRESSOR) 25 MG Tab Take 1 Tab by mouth 2 times a day. 180 Tab 3   • propafenone (RYTHMOL) 300 MG tablet TAKE 1 TAB BY MOUTH EVERY 8 HOURS. 270 Tab 3   • COCONUT OIL PO Take  by mouth.     • vitamin e (VITAMIN E) 400 UNIT Cap Take 400 Units by mouth every day.     • fenofibrate (ANTARA) 130 MG capsule   0   • pravastatin (PRAVACHOL) 40 MG tablet Take 40 mg by mouth every evening.     • Coenzyme Q10 (CO Q 10) 100 MG CAPS Take 1 Tab by mouth every day.     • B Complex-C (SUPER B COMPLEX PO) Take 1 Tab by mouth every day.     • vitamin D (CHOLECALCIFEROL) 1000 UNIT TABS Take 1,000 Units by mouth every day.     • [DISCONTINUED] metoprolol (LOPRESSOR) 25 MG Tab TAKE 1 TAB BY MOUTH 2 TIMES A DAY. 180 Tab 0   • [DISCONTINUED] levothyroxine (SYNTHROID) 112 MCG Tab Take 112 mcg by mouth Every morning on an empty stomach.     • [DISCONTINUED] levothyroxine (SYNTHROID) 50 MCG Tab TAKE ONE TABLET BY MOUTH DAILY  0   • [DISCONTINUED] Potassium Gluconate (HM POTASSIUM) 595 MG TABS Take  by mouth every day.     • [DISCONTINUED] magnesium gluconate (MAG-G) 500 MG tablet Take 500 mg by mouth every day.     • [DISCONTINUED] aspirin 81 MG tablet Take 81 mg by mouth every day.       No facility-administered encounter medications on file as of 7/20/2018.      Review of Systems   Constitutional: Negative for fever and malaise/fatigue.   Respiratory: Negative for cough and shortness of breath.    Cardiovascular: Positive for chest pain. Negative for palpitations, orthopnea, claudication, leg swelling and PND.        Chest tightness that radiates into neck   Gastrointestinal: Negative for abdominal pain.   Musculoskeletal: Negative for myalgias.        Objective:   /72   Pulse 70   Ht 1.778 m (5' 10\")   Wt 123.4 kg (272 lb)   SpO2 93%   BMI 39.03 kg/m²     Physical Exam "   Constitutional: He appears well-developed and well-nourished.   obese   HENT:   Head: Normocephalic and atraumatic.   Eyes: EOM are normal.   Neck: No JVD present.   Cardiovascular: Normal rate, regular rhythm, normal heart sounds and intact distal pulses.    Pulmonary/Chest: Effort normal and breath sounds normal.   Musculoskeletal: Normal range of motion. He exhibits no edema.   Skin: Skin is warm and dry.   Psychiatric: He has a normal mood and affect.   Nursing note and vitals reviewed.      Assessment:     1. Dysmetabolic syndrome X  NM-CARDIAC PET   2. Mixed hyperlipidemia  NM-CARDIAC PET   3. Non morbid obesity due to excess calories  NM-CARDIAC PET   4. PAF (paroxysmal atrial fibrillation) (HCC)     5. Other chest pain  NM-CARDIAC PET   6. Paroxysmal atrial fibrillation (HCC)     7. Essential hypertension  metoprolol (LOPRESSOR) 25 MG Tab     Medical Decision Making:  Today's Assessment / Status / Plan:     1. Chest tightness   -check PET for ischemia, concern for multiple risk factors for CAD  -cont cholesterol management with PCP  -call with results    2. HLD  -statin and fibrate  -LDL goal <100    3. HTN  -good control on metoprolol only    4. PAF  -concern for chest tightness related to afib  -rythmol only PRN for palpitations, he hasn't taken this  -cont metoprolol and eliquis  -tolerating eliquis with no bleeding    5. Obesity with hyperglycemia  -recommended lifestyle changes with more exercise and watching his calories, he agrees  -no resources wanted at this time    FU in clinic in 4 months with IA with review of symptoms, call for worsening symptoms    Patient verbalizes understanding and agrees with the plan of care.     Collaborating MD: Eden LARA

## 2018-08-03 ENCOUNTER — HOSPITAL ENCOUNTER (OUTPATIENT)
Dept: LAB | Facility: MEDICAL CENTER | Age: 63
End: 2018-08-03
Payer: COMMERCIAL

## 2018-08-03 LAB
BDY FAT % MEASURED: 30.8 %
BP DIAS: 55 MMHG
BP SYS: 127 MMHG
CHOLEST SERPL-MCNC: 142 MG/DL (ref 100–199)
DIABETES HTDIA: NO
EVENT NAME HTEVT: NORMAL
FASTING HTFAS: YES
GLUCOSE SERPL-MCNC: 117 MG/DL (ref 65–99)
HDLC SERPL-MCNC: 35 MG/DL
HYPERTENSION HTHYP: NO
LDLC SERPL CALC-MCNC: 78 MG/DL
SCREENING LOC CITY HTCIT: NORMAL
SCREENING LOC STATE HTSTA: NORMAL
SCREENING LOCATION HTLOC: NORMAL
SMOKING HTSMO: NO
SUBSCRIBER ID HTSID: NORMAL
TRIGL SERPL-MCNC: 143 MG/DL (ref 0–149)

## 2018-08-03 PROCEDURE — S5190 WELLNESS ASSESSMENT BY NONPH: HCPCS

## 2018-08-03 PROCEDURE — 36415 COLL VENOUS BLD VENIPUNCTURE: CPT

## 2018-08-03 PROCEDURE — 80061 LIPID PANEL: CPT

## 2018-08-03 PROCEDURE — 82947 ASSAY GLUCOSE BLOOD QUANT: CPT

## 2018-08-06 ENCOUNTER — HOSPITAL ENCOUNTER (OUTPATIENT)
Dept: RADIOLOGY | Facility: MEDICAL CENTER | Age: 63
End: 2018-08-06
Attending: NURSE PRACTITIONER
Payer: COMMERCIAL

## 2018-08-06 DIAGNOSIS — R07.89 OTHER CHEST PAIN: ICD-10-CM

## 2018-08-06 DIAGNOSIS — E78.2 MIXED HYPERLIPIDEMIA: ICD-10-CM

## 2018-08-06 DIAGNOSIS — E88.810 DYSMETABOLIC SYNDROME X: ICD-10-CM

## 2018-08-06 DIAGNOSIS — E66.09 NON MORBID OBESITY DUE TO EXCESS CALORIES: ICD-10-CM

## 2018-08-06 PROCEDURE — A9555 RB82 RUBIDIUM: HCPCS

## 2018-08-06 NOTE — PROCEDURES
AGE:  63.    GENDER:  Male.   HEIGHT:  5 feet 10 inches.    WEIGHT:  260   pounds.    INDICATIONS:  Chest pain and atrial fibrillation.    PROCEDURE:  The patient reviewed and signed the acknowledgement for testing   form.  The patient was in a fasting state and was properly prepared for   testing.  An intravenous line was inserted and a flush of normal saline   followed to insure line patency.    A transmission scan was acquired for attenuation correction using the internal   Germanium sources.  The patient was then administered 30 mCi of Rubidium-82.    Approximately 90 seconds after the infusion, resting imagine were obtained   with ECG-gating.  Following the resting series, the patient administered 60 mg   of dipyridamole over four minutes.  The blood pressure, heart rate and ECG   were monitored and recorded.  After the dipyridamole infusion was completed,   another transmission scan for attenuation correction was obtained.  The   patient was then administered 30 mCi of Rubidium-82.  Approximately 90 seconds   after the infusion, Peak stress images were obtained with ECG-gating.    CLINICAL RESPONSE:  Resting blood pressure was 133/109 with a heart rate of   79.  Immediately post-dipyridamole infusion the blood pressure was 113/63 with   a heart rate of 95.  After a recovery period the blood pressure was 123/65   with a heart rate of 88.    The patient experienced shortness of breath, headache and dizziness during   testing.    Aminophylline 0 mg was administered following the scan.    ELECTROCARDIOGRAPHIC FINDINGS:  Show a normal sinus rhythm with no ischemic ST   segment changes at rest or peak stress.    SCINTOGRAPHIC FINDINGS:  Show normal homogeneous tracer uptake at rest and   peak stress.    GATED WALL MOTION FINDINGS:  Show normal LV systolic function with a resting   ejection fraction of 62%, which increased to 72% at peak stress.    CONCLUSIONS AND IMPRESSION:  Normal stress test.        ____________________________________     MD RANDY WILLETT / RORO    DD:  08/06/2018 12:41:27  DT:  08/06/2018 13:47:19    D#:  [id number]  Job#:  [id number]

## 2018-08-07 ENCOUNTER — TELEPHONE (OUTPATIENT)
Dept: CARDIOLOGY | Facility: MEDICAL CENTER | Age: 63
End: 2018-08-07

## 2018-08-07 NOTE — TELEPHONE ENCOUNTER
MARCUS Morfin.  Marii Lazaro, R.CORIN.             Reassure patient of normal stress test result. Chest tightness may be non-cardiac. SC      Results letter sent to pt.

## 2018-12-05 ENCOUNTER — HOSPITAL ENCOUNTER (OUTPATIENT)
Dept: LAB | Facility: MEDICAL CENTER | Age: 63
End: 2018-12-05
Attending: FAMILY MEDICINE
Payer: COMMERCIAL

## 2018-12-05 LAB
ALBUMIN SERPL BCP-MCNC: 4.3 G/DL (ref 3.2–4.9)
ALBUMIN/GLOB SERPL: 1.7 G/DL
ALP SERPL-CCNC: 43 U/L (ref 30–99)
ALT SERPL-CCNC: 39 U/L (ref 2–50)
ANION GAP SERPL CALC-SCNC: 8 MMOL/L (ref 0–11.9)
AST SERPL-CCNC: 30 U/L (ref 12–45)
BILIRUB SERPL-MCNC: 0.7 MG/DL (ref 0.1–1.5)
BUN SERPL-MCNC: 18 MG/DL (ref 8–22)
CALCIUM SERPL-MCNC: 9.9 MG/DL (ref 8.5–10.5)
CHLORIDE SERPL-SCNC: 108 MMOL/L (ref 96–112)
CHOLEST SERPL-MCNC: 150 MG/DL (ref 100–199)
CO2 SERPL-SCNC: 25 MMOL/L (ref 20–33)
CREAT SERPL-MCNC: 0.79 MG/DL (ref 0.5–1.4)
EST. AVERAGE GLUCOSE BLD GHB EST-MCNC: 123 MG/DL
FASTING STATUS PATIENT QL REPORTED: NORMAL
GLOBULIN SER CALC-MCNC: 2.6 G/DL (ref 1.9–3.5)
GLUCOSE SERPL-MCNC: 102 MG/DL (ref 65–99)
HBA1C MFR BLD: 5.9 % (ref 0–5.6)
HDLC SERPL-MCNC: 42 MG/DL
LDLC SERPL CALC-MCNC: 88 MG/DL
POTASSIUM SERPL-SCNC: 3.4 MMOL/L (ref 3.6–5.5)
PROT SERPL-MCNC: 6.9 G/DL (ref 6–8.2)
SODIUM SERPL-SCNC: 141 MMOL/L (ref 135–145)
TRIGL SERPL-MCNC: 99 MG/DL (ref 0–149)

## 2018-12-05 PROCEDURE — 83036 HEMOGLOBIN GLYCOSYLATED A1C: CPT

## 2018-12-05 PROCEDURE — 80061 LIPID PANEL: CPT

## 2018-12-05 PROCEDURE — 36415 COLL VENOUS BLD VENIPUNCTURE: CPT

## 2018-12-05 PROCEDURE — 80053 COMPREHEN METABOLIC PANEL: CPT

## 2018-12-10 ENCOUNTER — OFFICE VISIT (OUTPATIENT)
Dept: CARDIOLOGY | Facility: MEDICAL CENTER | Age: 63
End: 2018-12-10
Payer: COMMERCIAL

## 2018-12-10 VITALS
WEIGHT: 271.17 LBS | SYSTOLIC BLOOD PRESSURE: 128 MMHG | DIASTOLIC BLOOD PRESSURE: 82 MMHG | BODY MASS INDEX: 38.82 KG/M2 | OXYGEN SATURATION: 95 % | HEIGHT: 70 IN | HEART RATE: 80 BPM

## 2018-12-10 DIAGNOSIS — Z79.01 CHRONIC ANTICOAGULATION: ICD-10-CM

## 2018-12-10 DIAGNOSIS — E78.5 DYSLIPIDEMIA: ICD-10-CM

## 2018-12-10 DIAGNOSIS — I48.0 PAF (PAROXYSMAL ATRIAL FIBRILLATION) (HCC): Primary | ICD-10-CM

## 2018-12-10 PROCEDURE — 99213 OFFICE O/P EST LOW 20 MIN: CPT | Performed by: INTERNAL MEDICINE

## 2018-12-10 ASSESSMENT — ENCOUNTER SYMPTOMS: PALPITATIONS: 1

## 2018-12-10 NOTE — PATIENT INSTRUCTIONS
For monitoring your palpitations you can consider the [employer]ive[employer] device for you smartphone:          You can learn more about it at this website:    [email or web address].com/en/    If you choose to obtain that device, and have strips for me to review, send me a [employer] message and I will provide you with my e-mail.    Salomon Mendez MD  Cardiologist, [facility] [place]

## 2018-12-10 NOTE — PROGRESS NOTES
Subjective:   Michael Villarreal is a 63 -year-old man with a history of paroxysmal atrial fibrillation, obesity, hypertension, and dyslipidemia.     He continues to do quite well and has minimal cardiovascular complaints telling me only of infrequent episodes of palpitations and a tight neck pressure.  He describes the episodes of palpitations associated with no pressure is occurring predominantly at night and not with exertion.  It lasts for several minutes at a time.  He does also tell me about mild lower extremity edema primarily on the right.  That has been going on for several months.    He comes in after having seen her nurse practitioner who ordered a PET myocardial perfusion imaging that demonstrated normal left ventricular ejection fraction and normal perfusion.  He was also placed on Eliquis, and he is comfortable taking that medication with no bleeding side effects.    Past Medical History:   Diagnosis Date   • Atrial fibrillation (HCC)     approx 1yr ago-   • High cholesterol    • Hyperlipidemia    • Hypertension    • Obesity    • Unspecified cataract      Past Surgical History:   Procedure Laterality Date   • CATARACT PHACO WITH IOL  4/20/2015    Performed by Jakob Garcia M.D. at SURGERY [facility] ORS   • CATARACT PHACO WITH IOL  4/1/2015    Performed by Jakob Garcia M.D. at SURGERY [facility] DAY [facility] ORS   • DUPUYTREN CONTRACTURE RELEASE  7/17/2012    Performed by RAFAEL CALVILLO at SURGERY [facility] ORS   • HAND SURGERY       Family History   Problem Relation Age of Onset   • Other Mother 68        kidney CA   • Heart Disease Neg Hx      History   Smoking Status   • Former Smoker   • Years: 5.00   • Types: Cigarettes   • Quit date: 7/17/1984   Smokeless Tobacco   • Former User   • Types: Chew   • Quit date: 7/17/1984     No Known Allergies  Outpatient Encounter Prescriptions as of 12/10/2018   Medication Sig Dispense Refill   • levothyroxine (SYNTHROID) 150 MCG Tab      • apixaban (ELIQUIS)  "5mg Tab Take 1 Tab by mouth 2 Times a Day. 60 Tab 11   • metoprolol (LOPRESSOR) 25 MG Tab Take 1 Tab by mouth 2 times a day. 180 Tab 3   • propafenone (RYTHMOL) 300 MG tablet TAKE 1 TAB BY MOUTH EVERY 8 HOURS. 270 Tab 3   • COCONUT OIL PO Take  by mouth.     • vitamin e (VITAMIN E) 400 UNIT Cap Take 400 Units by mouth every day.     • fenofibrate (ANTARA) 130 MG capsule   0   • pravastatin (PRAVACHOL) 40 MG tablet Take 40 mg by mouth every evening.     • Coenzyme Q10 (CO Q 10) 100 MG CAPS Take 1 Tab by mouth every day.     • B Complex-C (SUPER B COMPLEX PO) Take 1 Tab by mouth every day.     • vitamin D (CHOLECALCIFEROL) 1000 UNIT TABS Take 1,000 Units by mouth every day.       No facility-administered encounter medications on file as of 12/10/2018.      Review of Systems   Cardiovascular: Positive for palpitations (Infrequent, none consistent with atrial fibrillation).   All other systems reviewed and are negative.       Objective:   /82 (BP Location: Right arm, Patient Position: Sitting, BP Cuff Size: Adult)   Pulse 80   Ht 1.778 m (5' 10\")   Wt 123 kg (271 lb 2.7 oz)   SpO2 95%   BMI 38.91 kg/m²     Physical Exam   Constitutional: He is oriented to person, place, and time. He appears well-developed and well-nourished. No distress.   Pleasant, middle-age man in no distress.  Physical examination is unchanged compared to previous on 6/6/2017 except where specified.   HENT:   Head: Normocephalic and atraumatic.   Eyes: Pupils are equal, round, and reactive to light. Conjunctivae and EOM are normal. No scleral icterus.   Neck: Neck supple. No JVD present. No tracheal deviation present.   Cardiovascular: Normal rate, regular rhythm, normal heart sounds and intact distal pulses.  Exam reveals no gallop and no friction rub.    No murmur heard.  Pulses:       Dorsalis pedis pulses are 2+ on the right side, and 2+ on the left side.   No carotid bruits   Pulmonary/Chest: Effort normal and breath sounds normal. " "No stridor. No respiratory distress. He has no wheezes. He has no rales.   Abdominal: Soft. Bowel sounds are normal. He exhibits no distension.   Musculoskeletal: He exhibits edema (1+ right lower extremity edema, trace left lower extremity edema).   Neurological: He is alert and oriented to person, place, and time.   Skin: Skin is warm and dry. No rash noted. He is not diaphoretic. No erythema. No pallor.   Psychiatric: He has a normal mood and affect. Judgment and thought content normal.   Vitals reviewed.    Lab Results   Component Value Date/Time    WBC 6.0 10/13/2017 06:12 AM    RBC 5.21 10/13/2017 06:12 AM    HEMOGLOBIN 16.1 10/13/2017 06:12 AM    HEMATOCRIT 47.4 10/13/2017 06:12 AM    MCV 91.0 10/13/2017 06:12 AM    MCH 30.9 10/13/2017 06:12 AM    MCHC 34.0 10/13/2017 06:12 AM    MPV 13.2 (H) 10/13/2017 06:12 AM        Lab Results   Component Value Date/Time    SODIUM 141 12/05/2018 06:13 AM    POTASSIUM 3.4 (L) 12/05/2018 06:13 AM    CHLORIDE 108 12/05/2018 06:13 AM    CO2 25 12/05/2018 06:13 AM    GLUCOSE 102 (H) 12/05/2018 06:13 AM    BUN 18 12/05/2018 06:13 AM    CREATININE 0.79 12/05/2018 06:13 AM        Lab Results   Component Value Date/Time    ASTSGOT 30 12/05/2018 06:13 AM    ALTSGPT 39 12/05/2018 06:13 AM        Lab Results   Component Value Date/Time    CHOLSTRLTOT 150 12/05/2018 06:13 AM    LDL 88 12/05/2018 06:13 AM    HDL 42 12/05/2018 06:13 AM    TRIGLYCERIDE 99 12/05/2018 06:13 AM         No results found for this or any previous visit.    Echocardiogram, 11/2/2015:  \"CONCLUSIONS  Normal left ventricular systolic function.  Left ventricular ejection fraction is visually estimated to be 70%.  Mild mitral regurgitation.  Right ventricular systolic pressure is estimated to be 35 mmHg\"    PET myocardial perfusion imaging, 8/6/2018:  \"ELECTROCARDIOGRAPHIC FINDINGS:  Show a normal sinus rhythm with no ischemic ST   segment changes at rest or peak stress.  SCINTOGRAPHIC FINDINGS:  Show normal " "homogeneous tracer uptake at rest and   peak stress.  GATED WALL MOTION FINDINGS:  Show normal LV systolic function with a resting ejection fraction of 62%, which increased to 72% at peak stress.  CONCLUSIONS AND IMPRESSION:  Normal stress test\"    Assessment:     1. PAF (paroxysmal atrial fibrillation) (HCC)     2. Dyslipidemia     3. Chronic anticoagulation         Medical Decision Making:  Today's Assessment / Status / Plan:     He is doing very well today, and has minimal cardiovascular complaints with excellent control of his blood pressure and reasonable control of his cholesterol.  He had a few months ago PET myocardial perfusion imaging scan to ensure that his class Ic antiarrhythmic, pro-path unknown, continues to be safe.  His nuclear stress test was normal, and his antiarrhythmic continues to be safe.  He recently started on Eliquis for stroke prevention appropriately, and has no bleeding complications.  I have made no changes to his cardiovascular regimen nor ordered additional testing at this time.  He does have mild lower extremity edema probably related to some valvular incompetency in his right leg predominantly.  I will plan to repeat an echocardiogram probably in 6-12 months.  Also, to guide further lipid management I will plan to order a CT coronary calcium scan probably in 3 years or so.    Finally, with regards to his prediabetes level blood sugars, I will defer this to the primary care setting.  Certainly, metformin or an SGLT2 inhibitor would be ideal from a cardiovascular perspective.    Salomon Mendez MD  Cardiologist, [facility] [place]     Return in about 1 year (around 12/10/2019) for NP in 6 months, me in 1 year.    Physical Exam   Constitutional: He is oriented to person, place, and time. He appears well-developed and well-nourished. No distress.   Pleasant, middle-age man in no distress.  Physical examination is unchanged compared to previous on 6/6/2017 except where " specified.   HENT:   Head: Normocephalic and atraumatic.   Eyes: Pupils are equal, round, and reactive to light. Conjunctivae and EOM are normal. No scleral icterus.   Neck: Neck supple. No JVD present. No tracheal deviation present.   Cardiovascular: Normal rate, regular rhythm, normal heart sounds and intact distal pulses.  Exam reveals no gallop and no friction rub.    No murmur heard.  Pulses:       Dorsalis pedis pulses are 2+ on the right side, and 2+ on the left side.   No carotid bruits   Pulmonary/Chest: Effort normal and breath sounds normal. No stridor. No respiratory distress. He has no wheezes. He has no rales.   Abdominal: Soft. Bowel sounds are normal. He exhibits no distension.   Musculoskeletal: He exhibits edema (1+ right lower extremity edema, trace left lower extremity edema).   Neurological: He is alert and oriented to person, place, and time.   Skin: Skin is warm and dry. No rash noted. He is not diaphoretic. No erythema. No pallor.   Psychiatric: He has a normal mood and affect. Judgment and thought content normal.   Vitals reviewed.

## 2018-12-10 NOTE — LETTER
Name:          Michael Villarreal   YOB: 1955  Date:     12/10/2018      Robson Ramos D.O.  [address] [place] [address]     Salmoon Mendez MD  [address] [place] [address], [place] [address]  Phone: [phone]  Back Line: [phone]  Fax: [phone]  E-mail: [email or web address]   Dear Dr. Ramos,    We had the pleasure of seeing your patient, Michael Villarreal, in Cardiology Clinic at [facility] today.    As you know, he is a 63-year-old man with a history of paroxysmal atrial fibrillation, obesity, hypertension, and dyslipidemia.    He is doing very well today, and has minimal cardiovascular complaints with excellent control of his blood pressure and reasonable control of his cholesterol.  He had a few months ago PET myocardial perfusion imaging scan to ensure that his class Ic antiarrhythmic, pro-path unknown, continues to be safe.  His nuclear stress test was normal, and his antiarrhythmic continues to be safe.  He recently started on Eliquis for stroke prevention appropriately, and has no bleeding complications.  I have made no changes to his cardiovascular regimen nor ordered additional testing at this time.  He does have mild lower extremity edema probably related to some valvular incompetency in his right leg predominantly.  I will plan to repeat an echocardiogram probably in 6-12 months.  Also, to guide further lipid management I will plan to order a CT coronary calcium scan probably in 3 years or so.     Finally, with regards to his prediabetes level blood sugars, I will defer this to the primary care setting.  Certainly, metformin or an SGLT2 inhibitor would be ideal from a cardiovascular perspective.    Return in about 1 year (around 12/10/2019) for NP in 6 months, me in 1 year.    Thank you for the referral and please do not hesitate to contact me at any time. My contact information is listed above.    This note was dictated using Dragon speech recognition software.     A full note including my physical examination and a full list of rectified medications is available in our medical record, and can be faxed as well.    Salomon Mendez MD  Cardiologist  [facility]

## 2018-12-19 ENCOUNTER — HOSPITAL ENCOUNTER (OUTPATIENT)
Dept: LAB | Facility: MEDICAL CENTER | Age: 63
End: 2018-12-19
Attending: FAMILY MEDICINE
Payer: COMMERCIAL

## 2018-12-19 LAB
ANION GAP SERPL CALC-SCNC: 9 MMOL/L (ref 0–11.9)
CHLORIDE SERPL-SCNC: 109 MMOL/L (ref 96–112)
CO2 SERPL-SCNC: 28 MMOL/L (ref 20–33)
POTASSIUM SERPL-SCNC: 3.3 MMOL/L (ref 3.6–5.5)
SODIUM SERPL-SCNC: 146 MMOL/L (ref 135–145)

## 2018-12-19 PROCEDURE — 36415 COLL VENOUS BLD VENIPUNCTURE: CPT

## 2018-12-19 PROCEDURE — 80051 ELECTROLYTE PANEL: CPT

## 2019-01-03 ENCOUNTER — HOSPITAL ENCOUNTER (OUTPATIENT)
Dept: LAB | Facility: MEDICAL CENTER | Age: 64
End: 2019-01-03
Attending: FAMILY MEDICINE
Payer: COMMERCIAL

## 2019-01-03 LAB
ANION GAP SERPL CALC-SCNC: 10 MMOL/L (ref 0–11.9)
CHLORIDE SERPL-SCNC: 109 MMOL/L (ref 96–112)
CO2 SERPL-SCNC: 26 MMOL/L (ref 20–33)
POTASSIUM SERPL-SCNC: 3.8 MMOL/L (ref 3.6–5.5)
SODIUM SERPL-SCNC: 145 MMOL/L (ref 135–145)

## 2019-01-03 PROCEDURE — 80051 ELECTROLYTE PANEL: CPT

## 2019-01-03 PROCEDURE — 36415 COLL VENOUS BLD VENIPUNCTURE: CPT

## 2019-01-18 DIAGNOSIS — I48.0 PAROXYSMAL ATRIAL FIBRILLATION (HCC): ICD-10-CM

## 2019-01-21 RX ORDER — PROPAFENONE HYDROCHLORIDE 300 MG/1
300 TABLET, COATED ORAL EVERY 8 HOURS
Qty: 270 TAB | Refills: 3 | Status: SHIPPED | OUTPATIENT
Start: 2019-01-21 | End: 2019-06-11 | Stop reason: SDUPTHER

## 2019-06-11 ENCOUNTER — OFFICE VISIT (OUTPATIENT)
Dept: CARDIOLOGY | Facility: MEDICAL CENTER | Age: 64
End: 2019-06-11
Payer: COMMERCIAL

## 2019-06-11 VITALS
HEIGHT: 70 IN | WEIGHT: 266.76 LBS | OXYGEN SATURATION: 93 % | BODY MASS INDEX: 38.19 KG/M2 | SYSTOLIC BLOOD PRESSURE: 122 MMHG | DIASTOLIC BLOOD PRESSURE: 76 MMHG | HEART RATE: 74 BPM

## 2019-06-11 DIAGNOSIS — I48.0 PAROXYSMAL ATRIAL FIBRILLATION (HCC): ICD-10-CM

## 2019-06-11 DIAGNOSIS — E78.2 MIXED HYPERLIPIDEMIA: ICD-10-CM

## 2019-06-11 DIAGNOSIS — I48.0 PAF (PAROXYSMAL ATRIAL FIBRILLATION) (HCC): ICD-10-CM

## 2019-06-11 DIAGNOSIS — I10 ESSENTIAL HYPERTENSION: ICD-10-CM

## 2019-06-11 DIAGNOSIS — E88.810 DYSMETABOLIC SYNDROME X: ICD-10-CM

## 2019-06-11 DIAGNOSIS — Z79.01 CHRONIC ANTICOAGULATION: ICD-10-CM

## 2019-06-11 DIAGNOSIS — E66.09 NON MORBID OBESITY DUE TO EXCESS CALORIES: ICD-10-CM

## 2019-06-11 PROBLEM — E78.5 DYSLIPIDEMIA: Status: RESOLVED | Noted: 2018-12-10 | Resolved: 2019-06-11

## 2019-06-11 PROBLEM — R07.89 OTHER CHEST PAIN: Status: RESOLVED | Noted: 2018-07-20 | Resolved: 2019-06-11

## 2019-06-11 PROCEDURE — 99214 OFFICE O/P EST MOD 30 MIN: CPT | Performed by: NURSE PRACTITIONER

## 2019-06-11 RX ORDER — PROPAFENONE HYDROCHLORIDE 300 MG/1
300 TABLET, COATED ORAL
Qty: 90 TAB | Refills: 3 | COMMUNITY
Start: 2019-06-11 | End: 2021-01-05 | Stop reason: SDUPTHER

## 2019-06-11 ASSESSMENT — ENCOUNTER SYMPTOMS
ABDOMINAL PAIN: 0
CLAUDICATION: 0
COUGH: 0
DIZZINESS: 0
PND: 0
SHORTNESS OF BREATH: 0
MYALGIAS: 0
PALPITATIONS: 0
ORTHOPNEA: 0
FEVER: 0

## 2019-06-11 NOTE — LETTER
[facility]   [address] [facility] [address] [facility]  [place] [address]  Phone: [phone]  Fax: [phone]              Michael Villarreal  1955    Encounter Date: 6/11/2019    PATRICIA Morfin          PROGRESS NOTE:  Chief Complaint   Patient presents with   • Atrial Fibrillation     Subjective:   Michael Villarreal is a 63 y.o. male who presents today for 6 month follow up on PAF, HTN, and HLD.    He is a patient of Dr. Salomon Mendez in our office.     Hx of PAF, HLD, HTN, and obesity.    He has been doing very well with no cardiac symptoms to report.    He has lost 5 lbs and continues to work on healthy living with diet and walking.    Past Medical History:   Diagnosis Date   • High cholesterol    • Hyperlipidemia    • Hypertension    • Obesity    • Paroxysmal atrial fibrillation (HCC)    • Unspecified cataract      Past Surgical History:   Procedure Laterality Date   • CATARACT PHACO WITH IOL  4/20/2015    Performed by Jakob Garcia M.D. at SURGERY [facility] ORS   • CATARACT PHACO WITH IOL  4/1/2015    Performed by Jakob Garcia M.D. at SURGERY SAME DAY [facility] ORS   • DUPUYTREN CONTRACTURE RELEASE  7/17/2012    Performed by RAFAEL CALVILLO at SURGERY [facility] ORS   • HAND SURGERY       Family History   Problem Relation Age of Onset   • Other Mother 68        kidney CA   • Heart Disease Neg Hx      Social History     Social History   • Marital status:      Spouse name: N/A   • Number of children: N/A   • Years of education: N/A     Occupational History   • Not on file.     Social History Main Topics   • Smoking status: Former Smoker     Years: 5.00     Types: Cigarettes     Quit date: 7/17/1984   • Smokeless tobacco: Former User     Types: Chew     Quit date: 7/17/1984   • Alcohol use Yes      Comment: Occasionally   • Drug use: No   • Sexual activity: Not on file     Other Topics Concern   • Not on file     Social History Narrative   • No  "narrative on file     No Known Allergies  Outpatient Encounter Prescriptions as of 6/11/2019   Medication Sig Dispense Refill   • apixaban (ELIQUIS) 5mg Tab Take 1 Tab by mouth 2 Times a Day. 180 Tab 3   • metoprolol (LOPRESSOR) 25 MG Tab Take 1 Tab by mouth 2 times a day. 180 Tab 3   • propafenone (RYTHMOL) 300 MG tablet Take 1 Tab by mouth 1 time daily as needed (for palpitations). 90 Tab 3   • levothyroxine (SYNTHROID) 150 MCG Tab      • vitamin e (VITAMIN E) 400 UNIT Cap Take 400 Units by mouth every day.     • fenofibrate (ANTARA) 130 MG capsule   0   • pravastatin (PRAVACHOL) 40 MG tablet Take 40 mg by mouth every evening.     • Coenzyme Q10 (CO Q 10) 100 MG CAPS Take 1 Tab by mouth every day.     • B Complex-C (SUPER B COMPLEX PO) Take 1 Tab by mouth every day.     • vitamin D (CHOLECALCIFEROL) 1000 UNIT TABS Take 1,000 Units by mouth every day.     • [DISCONTINUED] propafenone (RYTHMOL) 300 MG tablet Take 1 Tab by mouth every 8 hours. 270 Tab 3   • [DISCONTINUED] apixaban (ELIQUIS) 5mg Tab Take 1 Tab by mouth 2 Times a Day. 60 Tab 11   • [DISCONTINUED] metoprolol (LOPRESSOR) 25 MG Tab Take 1 Tab by mouth 2 times a day. 180 Tab 3   • [DISCONTINUED] COCONUT OIL PO Take  by mouth.       No facility-administered encounter medications on file as of 6/11/2019.      Review of Systems   Constitutional: Negative for fever and malaise/fatigue.   Respiratory: Negative for cough and shortness of breath.    Cardiovascular: Negative for chest pain, palpitations, orthopnea, claudication, leg swelling and PND.   Gastrointestinal: Negative for abdominal pain.   Musculoskeletal: Negative for myalgias.   Neurological: Negative for dizziness.        Objective:   /76 (BP Location: Left arm, Patient Position: Sitting, BP Cuff Size: Adult)   Pulse 74   Ht 1.778 m (5' 10\")   Wt 121 kg (266 lb 12.1 oz)   SpO2 93%   BMI 38.28 kg/m²      Physical Exam   Constitutional: He appears well-developed and well-nourished.   obese   "   HENT:   Head: Normocephalic and atraumatic.   Eyes: EOM are normal.   Neck: No JVD present.   Cardiovascular: Normal rate, regular rhythm, normal heart sounds and intact distal pulses.    Pulmonary/Chest: Effort normal and breath sounds normal.   Musculoskeletal: Normal range of motion. He exhibits no edema.   Skin: Skin is warm and dry.   Psychiatric: He has a normal mood and affect.   Nursing note and vitals reviewed.      Assessment:     1. Dysmetabolic syndrome X  EC-ECHOCARDIOGRAM COMPLETE W/O CONT   2. Mixed hyperlipidemia  EC-ECHOCARDIOGRAM COMPLETE W/O CONT   3. Non morbid obesity due to excess calories  EC-ECHOCARDIOGRAM COMPLETE W/O CONT   4. PAF (paroxysmal atrial fibrillation) (HCC)  EC-ECHOCARDIOGRAM COMPLETE W/O CONT   5. Chronic anticoagulation  EC-ECHOCARDIOGRAM COMPLETE W/O CONT   6. Essential hypertension  metoprolol (LOPRESSOR) 25 MG Tab   7. Paroxysmal atrial fibrillation (HCC)  propafenone (RYTHMOL) 300 MG tablet     Medical Decision Making:  Today's Assessment / Status / Plan:     1. Hx of chest tightness   -PET with no ischemia and good EF  -follow clinically, no further chest pain    2. HLD  -statin and fibrate  -LDL goal <100  -yearly labs with PCP    3. HTN  -good control on metoprolol only    4. PAF  -asymptomatic and rate controlled  -rythmol only PRN for palpitations, he hasn't taken this  -cont metoprolol and eliquis  -tolerating eliquis with no bleeding    5. Obesity with hyperglycemia  -recommended further weight loss with increasing aerobic activity and HR during exercise  -cont with health diet  -no resources wanted at this time    FU in clinic in 6 months with LS with echo, yearly labs    Patient verbalizes understanding and agrees with the plan of care.     Collaborating MD: Kate LARA        No Recipients

## 2019-06-11 NOTE — PATIENT INSTRUCTIONS
You will obtain lab work with your primary care doctor this year. Please send us a copy of your lab work.    We will obtain an echocardiogram prior to your next appointment.    We will set you up with another cardiologist in 6 months to establish care.

## 2019-06-11 NOTE — PROGRESS NOTES
Chief Complaint   Patient presents with   • Atrial Fibrillation     Subjective:   Michael Villarreal is a 63 y.o. male who presents today for 6 month follow up on PAF, HTN, and HLD.    He is a patient of Dr. Salomon Mendez in our office.     Hx of PAF, HLD, HTN, and obesity.    He has been doing very well with no cardiac symptoms to report.    He has lost 5 lbs and continues to work on healthy living with diet and walking.    Past Medical History:   Diagnosis Date   • High cholesterol    • Hyperlipidemia    • Hypertension    • Obesity    • Paroxysmal atrial fibrillation (HCC)    • Unspecified cataract      Past Surgical History:   Procedure Laterality Date   • CATARACT PHACO WITH IOL  4/20/2015    Performed by Jakob Garcia M.D. at SURGERY [facility] ORS   • CATARACT PHACO WITH IOL  4/1/2015    Performed by Jakob Garcia M.D. at SURGERY SAME DAY [facility] ORS   • DUPUYTREN CONTRACTURE RELEASE  7/17/2012    Performed by RAFAEL CALVILLO at SURGERY [facility] ORS   • HAND SURGERY       Family History   Problem Relation Age of Onset   • Other Mother 68        kidney CA   • Heart Disease Neg Hx      Social History     Social History   • Marital status:      Spouse name: N/A   • Number of children: N/A   • Years of education: N/A     Occupational History   • Not on file.     Social History Main Topics   • Smoking status: Former Smoker     Years: 5.00     Types: Cigarettes     Quit date: 7/17/1984   • Smokeless tobacco: Former User     Types: Chew     Quit date: 7/17/1984   • Alcohol use Yes      Comment: Occasionally   • Drug use: No   • Sexual activity: Not on file     Other Topics Concern   • Not on file     Social History Narrative   • No narrative on file     No Known Allergies  Outpatient Encounter Prescriptions as of 6/11/2019   Medication Sig Dispense Refill   • apixaban (ELIQUIS) 5mg Tab Take 1 Tab by mouth 2 Times a Day. 180 Tab 3   • metoprolol (LOPRESSOR) 25 MG Tab Take 1 Tab by mouth 2 times a day.  "180 Tab 3   • propafenone (RYTHMOL) 300 MG tablet Take 1 Tab by mouth 1 time daily as needed (for palpitations). 90 Tab 3   • levothyroxine (SYNTHROID) 150 MCG Tab      • vitamin e (VITAMIN E) 400 UNIT Cap Take 400 Units by mouth every day.     • fenofibrate (ANTARA) 130 MG capsule   0   • pravastatin (PRAVACHOL) 40 MG tablet Take 40 mg by mouth every evening.     • Coenzyme Q10 (CO Q 10) 100 MG CAPS Take 1 Tab by mouth every day.     • B Complex-C (SUPER B COMPLEX PO) Take 1 Tab by mouth every day.     • vitamin D (CHOLECALCIFEROL) 1000 UNIT TABS Take 1,000 Units by mouth every day.     • [DISCONTINUED] propafenone (RYTHMOL) 300 MG tablet Take 1 Tab by mouth every 8 hours. 270 Tab 3   • [DISCONTINUED] apixaban (ELIQUIS) 5mg Tab Take 1 Tab by mouth 2 Times a Day. 60 Tab 11   • [DISCONTINUED] metoprolol (LOPRESSOR) 25 MG Tab Take 1 Tab by mouth 2 times a day. 180 Tab 3   • [DISCONTINUED] COCONUT OIL PO Take  by mouth.       No facility-administered encounter medications on file as of 6/11/2019.      Review of Systems   Constitutional: Negative for fever and malaise/fatigue.   Respiratory: Negative for cough and shortness of breath.    Cardiovascular: Negative for chest pain, palpitations, orthopnea, claudication, leg swelling and PND.   Gastrointestinal: Negative for abdominal pain.   Musculoskeletal: Negative for myalgias.   Neurological: Negative for dizziness.        Objective:   /76 (BP Location: Left arm, Patient Position: Sitting, BP Cuff Size: Adult)   Pulse 74   Ht 1.778 m (5' 10\")   Wt 121 kg (266 lb 12.1 oz)   SpO2 93%   BMI 38.28 kg/m²     Physical Exam   Constitutional: He appears well-developed and well-nourished.   obese   HENT:   Head: Normocephalic and atraumatic.   Eyes: EOM are normal.   Neck: No JVD present.   Cardiovascular: Normal rate, regular rhythm, normal heart sounds and intact distal pulses.    Pulmonary/Chest: Effort normal and breath sounds normal.   Musculoskeletal: Normal " range of motion. He exhibits no edema.   Skin: Skin is warm and dry.   Psychiatric: He has a normal mood and affect.   Nursing note and vitals reviewed.      Assessment:     1. Dysmetabolic syndrome X  EC-ECHOCARDIOGRAM COMPLETE W/O CONT   2. Mixed hyperlipidemia  EC-ECHOCARDIOGRAM COMPLETE W/O CONT   3. Non morbid obesity due to excess calories  EC-ECHOCARDIOGRAM COMPLETE W/O CONT   4. PAF (paroxysmal atrial fibrillation) (HCC)  EC-ECHOCARDIOGRAM COMPLETE W/O CONT   5. Chronic anticoagulation  EC-ECHOCARDIOGRAM COMPLETE W/O CONT   6. Essential hypertension  metoprolol (LOPRESSOR) 25 MG Tab   7. Paroxysmal atrial fibrillation (HCC)  propafenone (RYTHMOL) 300 MG tablet     Medical Decision Making:  Today's Assessment / Status / Plan:     1. Hx of chest tightness   -PET with no ischemia and good EF  -follow clinically, no further chest pain    2. HLD  -statin and fibrate  -LDL goal <100  -yearly labs with PCP    3. HTN  -good control on metoprolol only    4. PAF  -asymptomatic and rate controlled  -rythmol only PRN for palpitations, he hasn't taken this  -cont metoprolol and eliquis  -tolerating eliquis with no bleeding  -consider sleep apnea test at next apt  -check echo for structure and function before next apt    5. Obesity with hyperglycemia  -recommended further weight loss with increasing aerobic activity and HR during exercise  -cont with health diet  -no resources wanted at this time    FU in clinic in 6 months with LS with echo, yearly labs    Patient verbalizes understanding and agrees with the plan of care.     Collaborating MD: Kate LARA

## 2019-07-07 DIAGNOSIS — I48.0 PAF (PAROXYSMAL ATRIAL FIBRILLATION) (HCC): Primary | ICD-10-CM

## 2019-07-09 RX ORDER — APIXABAN 5 MG/1
TABLET, FILM COATED ORAL
Qty: 180 TAB | Refills: 3 | Status: SHIPPED | OUTPATIENT
Start: 2019-07-09 | End: 2020-08-11 | Stop reason: SDUPTHER

## 2019-07-18 ENCOUNTER — HOSPITAL ENCOUNTER (OUTPATIENT)
Dept: LAB | Facility: MEDICAL CENTER | Age: 64
End: 2019-07-18
Attending: FAMILY MEDICINE
Payer: COMMERCIAL

## 2019-07-18 LAB
ALBUMIN SERPL BCP-MCNC: 4.3 G/DL (ref 3.2–4.9)
ALBUMIN/GLOB SERPL: 1.7 G/DL
ALP SERPL-CCNC: 48 U/L (ref 30–99)
ALT SERPL-CCNC: 37 U/L (ref 2–50)
ANION GAP SERPL CALC-SCNC: 5 MMOL/L (ref 0–11.9)
AST SERPL-CCNC: 29 U/L (ref 12–45)
BILIRUB SERPL-MCNC: 0.8 MG/DL (ref 0.1–1.5)
BUN SERPL-MCNC: 12 MG/DL (ref 8–22)
CALCIUM SERPL-MCNC: 9.5 MG/DL (ref 8.5–10.5)
CHLORIDE SERPL-SCNC: 106 MMOL/L (ref 96–112)
CHOLEST SERPL-MCNC: 152 MG/DL (ref 100–199)
CO2 SERPL-SCNC: 29 MMOL/L (ref 20–33)
CREAT SERPL-MCNC: 0.93 MG/DL (ref 0.5–1.4)
EST. AVERAGE GLUCOSE BLD GHB EST-MCNC: 120 MG/DL
FASTING STATUS PATIENT QL REPORTED: NORMAL
GLOBULIN SER CALC-MCNC: 2.6 G/DL (ref 1.9–3.5)
GLUCOSE SERPL-MCNC: 119 MG/DL (ref 65–99)
HBA1C MFR BLD: 5.8 % (ref 0–5.6)
HDLC SERPL-MCNC: 35 MG/DL
LDLC SERPL CALC-MCNC: 92 MG/DL
POTASSIUM SERPL-SCNC: 3.6 MMOL/L (ref 3.6–5.5)
PROT SERPL-MCNC: 6.9 G/DL (ref 6–8.2)
SODIUM SERPL-SCNC: 140 MMOL/L (ref 135–145)
TRIGL SERPL-MCNC: 124 MG/DL (ref 0–149)

## 2019-07-18 PROCEDURE — 80053 COMPREHEN METABOLIC PANEL: CPT

## 2019-07-18 PROCEDURE — 36415 COLL VENOUS BLD VENIPUNCTURE: CPT

## 2019-07-18 PROCEDURE — 83036 HEMOGLOBIN GLYCOSYLATED A1C: CPT

## 2019-07-18 PROCEDURE — 80061 LIPID PANEL: CPT

## 2019-09-19 ENCOUNTER — HOSPITAL ENCOUNTER (OUTPATIENT)
Dept: LAB | Facility: MEDICAL CENTER | Age: 64
End: 2019-09-19
Payer: COMMERCIAL

## 2019-09-19 LAB
BDY FAT % MEASURED: 30.8 %
BP DIAS: 80 MMHG
BP SYS: 120 MMHG
CHOLEST SERPL-MCNC: 138 MG/DL (ref 100–199)
DIABETES HTDIA: NO
EVENT NAME HTEVT: NORMAL
FASTING HTFAS: YES
GLUCOSE SERPL-MCNC: 107 MG/DL (ref 65–99)
HDLC SERPL-MCNC: 37 MG/DL
HYPERTENSION HTHYP: NO
LDLC SERPL CALC-MCNC: 78 MG/DL
SCREENING LOC CITY HTCIT: NORMAL
SCREENING LOC STATE HTSTA: NORMAL
SCREENING LOCATION HTLOC: NORMAL
SMOKING HTSMO: NO
SUBSCRIBER ID HTSID: NORMAL
TRIGL SERPL-MCNC: 116 MG/DL (ref 0–149)

## 2019-09-19 PROCEDURE — 80061 LIPID PANEL: CPT

## 2019-09-19 PROCEDURE — 82947 ASSAY GLUCOSE BLOOD QUANT: CPT

## 2019-09-19 PROCEDURE — S5190 WELLNESS ASSESSMENT BY NONPH: HCPCS

## 2019-09-19 PROCEDURE — 36415 COLL VENOUS BLD VENIPUNCTURE: CPT

## 2019-10-17 ENCOUNTER — HOSPITAL ENCOUNTER (OUTPATIENT)
Dept: LAB | Facility: MEDICAL CENTER | Age: 64
End: 2019-10-17
Attending: FAMILY MEDICINE
Payer: COMMERCIAL

## 2019-10-17 LAB
ANION GAP SERPL CALC-SCNC: 7 MMOL/L (ref 0–11.9)
BUN SERPL-MCNC: 15 MG/DL (ref 8–22)
CALCIUM SERPL-MCNC: 9.8 MG/DL (ref 8.5–10.5)
CHLORIDE SERPL-SCNC: 111 MMOL/L (ref 96–112)
CO2 SERPL-SCNC: 27 MMOL/L (ref 20–33)
CREAT SERPL-MCNC: 0.85 MG/DL (ref 0.5–1.4)
EST. AVERAGE GLUCOSE BLD GHB EST-MCNC: 117 MG/DL
FASTING STATUS PATIENT QL REPORTED: NORMAL
GLUCOSE SERPL-MCNC: 97 MG/DL (ref 65–99)
HBA1C MFR BLD: 5.7 % (ref 0–5.6)
POTASSIUM SERPL-SCNC: 4 MMOL/L (ref 3.6–5.5)
SODIUM SERPL-SCNC: 145 MMOL/L (ref 135–145)
TSH SERPL DL<=0.005 MIU/L-ACNC: 1.15 UIU/ML (ref 0.38–5.33)

## 2019-10-17 PROCEDURE — 84443 ASSAY THYROID STIM HORMONE: CPT

## 2019-10-17 PROCEDURE — 83036 HEMOGLOBIN GLYCOSYLATED A1C: CPT

## 2019-10-17 PROCEDURE — 80048 BASIC METABOLIC PNL TOTAL CA: CPT

## 2019-10-17 PROCEDURE — 36415 COLL VENOUS BLD VENIPUNCTURE: CPT

## 2019-11-01 ENCOUNTER — TELEPHONE (OUTPATIENT)
Dept: CARDIOLOGY | Facility: MEDICAL CENTER | Age: 64
End: 2019-11-01

## 2019-11-01 NOTE — TELEPHONE ENCOUNTER
Called and left message on 11/01/2019 for patient to remind him that he has an upcoming appointment with Jane Mcgill on 11/21/2019 at 8:00AM and he needs to do his Echo before his appointment

## 2019-11-11 ENCOUNTER — HOSPITAL ENCOUNTER (OUTPATIENT)
Dept: CARDIOLOGY | Facility: MEDICAL CENTER | Age: 64
End: 2019-11-11
Attending: NURSE PRACTITIONER
Payer: COMMERCIAL

## 2019-11-11 DIAGNOSIS — I48.0 PAF (PAROXYSMAL ATRIAL FIBRILLATION) (HCC): ICD-10-CM

## 2019-11-11 DIAGNOSIS — E66.09 NON MORBID OBESITY DUE TO EXCESS CALORIES: ICD-10-CM

## 2019-11-11 DIAGNOSIS — Z79.01 CHRONIC ANTICOAGULATION: ICD-10-CM

## 2019-11-11 DIAGNOSIS — E78.2 MIXED HYPERLIPIDEMIA: ICD-10-CM

## 2019-11-11 DIAGNOSIS — E88.810 DYSMETABOLIC SYNDROME X: ICD-10-CM

## 2019-11-11 LAB
LV EJECT FRACT  99904: 70
LV EJECT FRACT MOD 2C 99903: 80.23
LV EJECT FRACT MOD 4C 99902: 75.33
LV EJECT FRACT MOD BP 99901: 72.37

## 2019-11-11 PROCEDURE — 93306 TTE W/DOPPLER COMPLETE: CPT

## 2019-11-11 PROCEDURE — 93306 TTE W/DOPPLER COMPLETE: CPT | Mod: 26 | Performed by: INTERNAL MEDICINE

## 2019-11-21 ENCOUNTER — OFFICE VISIT (OUTPATIENT)
Dept: CARDIOLOGY | Facility: MEDICAL CENTER | Age: 64
End: 2019-11-21
Payer: COMMERCIAL

## 2019-11-21 VITALS
WEIGHT: 268.96 LBS | HEART RATE: 70 BPM | DIASTOLIC BLOOD PRESSURE: 90 MMHG | SYSTOLIC BLOOD PRESSURE: 122 MMHG | BODY MASS INDEX: 38.51 KG/M2 | HEIGHT: 70 IN | OXYGEN SATURATION: 94 %

## 2019-11-21 DIAGNOSIS — E88.810 DYSMETABOLIC SYNDROME X: ICD-10-CM

## 2019-11-21 DIAGNOSIS — E78.2 MIXED HYPERLIPIDEMIA: ICD-10-CM

## 2019-11-21 DIAGNOSIS — Z79.01 CHRONIC ANTICOAGULATION: ICD-10-CM

## 2019-11-21 DIAGNOSIS — I48.0 PAF (PAROXYSMAL ATRIAL FIBRILLATION) (HCC): ICD-10-CM

## 2019-11-21 DIAGNOSIS — I49.3 PVC (PREMATURE VENTRICULAR CONTRACTION): ICD-10-CM

## 2019-11-21 DIAGNOSIS — I10 ESSENTIAL HYPERTENSION: ICD-10-CM

## 2019-11-21 DIAGNOSIS — I49.1 PREMATURE ATRIAL CONTRACTION: ICD-10-CM

## 2019-11-21 DIAGNOSIS — R07.89 OTHER CHEST PAIN: ICD-10-CM

## 2019-11-21 LAB — EKG IMPRESSION: NORMAL

## 2019-11-21 PROCEDURE — 99214 OFFICE O/P EST MOD 30 MIN: CPT | Performed by: INTERNAL MEDICINE

## 2019-11-21 PROCEDURE — 93000 ELECTROCARDIOGRAM COMPLETE: CPT | Performed by: INTERNAL MEDICINE

## 2019-11-21 RX ORDER — ATORVASTATIN CALCIUM 40 MG/1
40 TABLET, FILM COATED ORAL DAILY
Qty: 90 TAB | Refills: 3 | Status: SHIPPED | OUTPATIENT
Start: 2019-11-21 | End: 2020-11-23

## 2019-11-21 RX ORDER — METFORMIN HYDROCHLORIDE 500 MG/1
500 TABLET, EXTENDED RELEASE ORAL DAILY
COMMUNITY
Start: 2019-11-13 | End: 2022-06-09

## 2019-11-21 NOTE — PROGRESS NOTES
Subjective:   Chief Complaint:   Chief Complaint   Patient presents with   • Atrial Fibrillation       Michael Villarreal is a 64 y.o. male who returns today for paroxysmal atrial fibrillation, chronic anticoagulation, hypertension and dyslipidemia.    He recalls waking up in afib, felt heart racing, dizzy, SOB, HR over 160, was given IV meds in the ED.  He had been drinking heavily the night before. This is rare, he rarely drinks. Drinks 2 cups of coffee in the morning.    He does have episodes of lightheaded, can last up to an hour. Thinks these could be afib, but no racing.  Did have 2 episodes were pain came up into his neck.  Has taken propafenone.  He reports repeated minor head concussions but that type of dizziness.    Echocardiogram 2019 with normal size left atrium, normal EF, no significant abnormalities.    Had a PET nuclear stress test in 2018 which showed normal perfusion.  Remote Holter 2015 with brief runs of paroxysmal atrial tachycardia but no A. fib, some PVCs and PACs.    Chads 2 vascular score is 0 or one, not clear, no prior HTN, has IFG, will be 65 next spring.  Remains on apixaban, no bleeding.  Has been on metoprolol only, has Propafenone 300 mg as needed.    Has dyslipidemia, LDL 78, HDL low at 37, has been on fenofibrate and pravastatin 40 mg.  Now has IFG, new.    No family history of premature coronary artery disease.  Former smoker, remote.  No history of autoimmune disease such as lupus or rheumatoid arthritis.  No chronic kidney disease.      Walks 3-6 miles at least 5 days per week.      DATA REVIEWED by me:  ECG 11-21-19  Sinus, 64, NS IVCD, baseline wandering.    ECG 10/24/2014  Sinus with frequent PACs, delayed R wave progression, supraventricular bigeminy, left axis deviation, nonspecific diffuse repolarization abnormality    Holter monitor 2015  No A. Fib, rare PVCs, rare PACs, short runs of paroxysmal atrial tachycardia    Echo 11-11-19  Normal left ventricular chamber  size.  Left ventricular ejection fraction is visually estimated to be 70%.  Normal diastolic function.  No significant valve abnormalities.   Normal inferior vena cava size and inspiratory collapse.    Echo 11/11/2018  Normal left ventricular chamber size.  Left ventricular ejection fraction is visually estimated to be 70%.  Normal diastolic function.  No significant valve abnormalities.   Normal inferior vena cava size and inspiratory collapse.  Normal-sized left atrium    Echocardiogram, 11/2/2015:  Normal left ventricular systolic function.  Left ventricular ejection fraction is visually estimated to be 70%.  Mild mitral regurgitation.  Right ventricular systolic pressure is estimated to be 35 mmHg     PET myocardial perfusion imaging, 8/6/2018:  ELECTROCARDIOGRAPHIC FINDINGS:  Show a normal sinus rhythm with no ischemic ST segment changes at rest or peak stress.  SCINTOGRAPHIC FINDINGS:  Show normal homogeneous tracer uptake at rest and peak stress.  GATED WALL MOTION FINDINGS:  Show normal LV systolic function with a resting ejection fraction of 62%, which increased to 72% at peak stress.  CONCLUSIONS AND IMPRESSION:  Normal stress test    Treadmill stress test 10/23/2015  Baseline ECG: normal sinus rhythm, rate 60. No ischemic changes.  Exercise ECG: Good exercise capacity, achieving an adequate  cardiac workload; 9 minutes 15 seconds of a standard Surendra  protocol, 10.6 METS, 85% of Maximum Age Predicted Heart Rate  (MAPHR).   No arrhythmias.  Normal blood pressure response to exercise.   There is no clinical or electrocardiographic evidence of  ischemia.  Rodriges score places the patient at low risk.    Most recent labs:     10/7/2019 sodium 145 5, potassium 4, creatinine 0.85, hemoglobin A1c 5.7, TSH 1.15  9/19/2019 HDL low at 37, LDL 78, triglycerides 116, total cholesterol 138  7/18/2019 LFTs normal  10/13/2017 hemoglobin 16.1, platelets 179    Past Medical History:   Diagnosis Date   • High cholesterol    •  Hyperlipidemia    • Hypertension    • Obesity    • Paroxysmal atrial fibrillation (HCC)    • Unspecified cataract      Past Surgical History:   Procedure Laterality Date   • CATARACT PHACO WITH IOL  2015    Performed by Jakob Garcia M.D. at SURGERY [facility]   • CATARACT PHACO WITH IOL  2015    Performed by Jakob Garcia M.D. at SURGERY SAME DAY [facility]S   • DUPUYTREN CONTRACTURE RELEASE  2012    Performed by RAFAEL CALVILLO at SURGERY [facility] ORS   • HAND SURGERY       Family History   Problem Relation Age of Onset   • Other Mother 68        kidney CA   • Heart Disease Mother         CHF at 86     Social History     Socioeconomic History   • Marital status:      Spouse name: Not on file   • Number of children: Not on file   • Years of education: Not on file   • Highest education level: Not on file   Occupational History   • Not on file   Social Needs   • Financial resource strain: Not on file   • Food insecurity:     Worry: Not on file     Inability: Not on file   • Transportation needs:     Medical: Not on file     Non-medical: Not on file   Tobacco Use   • Smoking status: Former Smoker     Years: 5.00     Types: Cigarettes     Last attempt to quit: 1984     Years since quittin.3   • Smokeless tobacco: Former User     Types: Chew     Quit date: 1984   Substance and Sexual Activity   • Alcohol use: Yes     Comment: Occasionally   • Drug use: No   • Sexual activity: Not on file   Lifestyle   • Physical activity:     Days per week: Not on file     Minutes per session: Not on file   • Stress: Not on file   Relationships   • Social connections:     Talks on phone: Not on file     Gets together: Not on file     Attends Judaism service: Not on file     Active member of club or organization: Not on file     Attends meetings of clubs or organizations: Not on file     Relationship status: Not on file   • Intimate partner violence:     Fear of current or ex partner:  "Not on file     Emotionally abused: Not on file     Physically abused: Not on file     Forced sexual activity: Not on file   Other Topics Concern   • Not on file   Social History Narrative   • Not on file     No Known Allergies    Current Outpatient Medications   Medication Sig Dispense Refill   • metFORMIN ER (GLUCOPHAGE XR) 500 MG TABLET SR 24 HR Take 500 mg by mouth every day.     • atorvastatin (LIPITOR) 40 MG Tab Take 1 Tab by mouth every day. 90 Tab 3   • ELIQUIS 5 MG Tab TAKE ONE TABLET BY MOUTH TWICE A  Tab 3   • metoprolol (LOPRESSOR) 25 MG Tab Take 1 Tab by mouth 2 times a day. 180 Tab 3   • propafenone (RYTHMOL) 300 MG tablet Take 1 Tab by mouth 1 time daily as needed (for palpitations). 90 Tab 3   • levothyroxine (SYNTHROID) 150 MCG Tab      • vitamin e (VITAMIN E) 400 UNIT Cap Take 400 Units by mouth every day.     • Coenzyme Q10 (CO Q 10) 100 MG CAPS Take 1 Tab by mouth every day.     • B Complex-C (SUPER B COMPLEX PO) Take 1 Tab by mouth every day.     • vitamin D (CHOLECALCIFEROL) 1000 UNIT TABS Take 1,000 Units by mouth every day.       No current facility-administered medications for this visit.        ROS  All others systems reviewed and negative.     Objective:     /90 (BP Location: Left arm, Patient Position: Sitting, BP Cuff Size: Adult)   Pulse 70   Ht 1.778 m (5' 10\")   Wt 122 kg (268 lb 15.4 oz)   SpO2 94%  Body mass index is 38.59 kg/m².    Physical Exam   General: No acute distress. Well nourished.  HEENT: EOM grossly intact, no scleral icterus, no pharyngeal erythema.   Neck:  No JVD, no bruits, trachea midline  CVS: RRR. Normal S1, S2. No M/R/G. No LE edema.  2+ radial pulses, 2+ PT pulses  Resp: CTAB. No wheezing or crackles/rhonchi. Normal respiratory effort.  Abdomen: Soft, NT, no fatimah hepatomegaly.  MSK/Ext: No clubbing or cyanosis.  Skin: Warm and dry, no rashes.  Neurological: CN III-XII grossly intact. No focal deficits.   Psych: A&O x 3, appropriate affect, good " judgement      Assessment:     1. PAF (paroxysmal atrial fibrillation) (HCC)  EKG   2. Chronic anticoagulation     3. Essential hypertension  EKG   4. Dysmetabolic syndrome X     5. Mixed hyperlipidemia  HEPATIC FUNCTION PANEL    Lipid Profile    VITAMIN D 25-HYDROXY   6. Other chest pain     7. Premature atrial contraction  EKG   8. PVC (premature ventricular contraction)  EKG       Medical Decision Making:  Today's Assessment / Status / Plan:     -Consider stopping fenofibrate  -Chads 2 vascular score is 0 or one, not clear, no prior HTN, has IFG, will be 65 next spring. I would support him staying on eliquis or  mg.  -Update lipid therapy  -AAA screening at 65  -If he gets karida mobile and has afib, consider increasing metoprolol    Written instructions given today:    -It would be reasonable to go back to  mg daily in place of apixaban for now until your risk of stroke increases, if you develop diabetes or high blood pressure we would tell you to go back to Apixaban/Eliquis, eventually you will be on apixaban.  -Stop fenofibrate and pravastatin, start atorvastatin (once day or night) 40 mg, watch for aching muscles.  -Repeat cholesterol 3 months to see response, fasting blood work  -You should always hear results of testing within 5 days with my interpretation, if you do not, send a [employer] message or call the office: [phone].  -Questions, concerns, change in symptoms, you can send [employer] message.  -Would be ok to try increasing your metoprolol, start by 37.5 mg AM and PM, 1.5 pills twice a day, then 2 pills twice a day, would be 50 mg AM and PM, max dose is 100 mg AM and PM      Return in about 1 year (around 11/21/2020).    It is my pleasure to participate in the care of Mr. Villarreal.  Please do not hesitate to contact me with questions or concerns.    Jane Mcgill MD, [facility]  Cardiologist [facility]    Please note that this dictation was created using  voice recognition software. I have made every reasonable attempt to correct obvious errors, but it is possible there are errors of grammar and possibly content that I did not discover before finalizing the note.

## 2019-11-21 NOTE — PATIENT INSTRUCTIONS
-It would be reasonable to go back to  mg daily in place of apixaban for now until your risk of stroke increases, if you develop diabetes or high blood pressure we would tell you to go back to Apixaban/Eliquis, eventually you will be on apixaban.  -Stop fenofibrate and pravastatin, start atorvastatin (once day or night) 40 mg, watch for aching muscles.  -Repeat cholesterol 3 months to see response, fasting blood work  -You should always hear results of testing within 5 days with my interpretation, if you do not, send a [employer] message or call the office: [phone].  -Questions, concerns, change in symptoms, you can send [employer] message.  -Would be ok to try increasing your metoprolol, start by 37.5 mg AM and PM, 1.5 pills twice a day, then 2 pills twice a day, would be 50 mg AM and PM, max dose is 100 mg AM and PM    For monitoring your palpitations you can consider the [employer] with [employer] device for you smartphone, you can save a PDF copy and email it to me via [employer].          You can learn more about it at this website:    [email or web address]    Can be purchased online, any retailer.

## 2019-11-21 NOTE — LETTER
[facility]   [address]  [place] [address]  Phone: [phone]  Fax: [phone]              Michael Villarreal  1955    Encounter Date: 11/21/2019    Jane Mcgill M.D.          PROGRESS NOTE:  Subjective:   Chief Complaint:   Chief Complaint   Patient presents with   • Atrial Fibrillation       Michael Villarreal is a 64 y.o. male who returns today for paroxysmal atrial fibrillation, chronic anticoagulation, hypertension and dyslipidemia.    He recalls waking up in afib, felt heart racing, dizzy, SOB, HR over 160, was given IV meds in the ED.  He had been drinking heavily the night before. This is rare, he rarely drinks. Drinks 2 cups of coffee in the morning.    He does have episodes of lightheaded, can last up to an hour. Thinks these could be afib, but no racing.  Did have 2 episodes were pain came up into his neck.  Has taken propafenone.  He reports repeated minor head concussions but that type of dizziness.    Echocardiogram 2019 with normal size left atrium, normal EF, no significant abnormalities.    Had a PET nuclear stress test in 2018 which showed normal perfusion.  Remote Holter 2015 with brief runs of paroxysmal atrial tachycardia but no A. fib, some PVCs and PACs.    Chads 2 vascular score is 0 or one, not clear, no prior HTN, has IFG, will be 65 next spring.  Remains on apixaban, no bleeding.  Has been on metoprolol only, has Propafenone 300 mg as needed.    Has dyslipidemia, LDL 78, HDL low at 37, has been on fenofibrate and pravastatin 40 mg.  Now has IFG, new.    No family history of premature coronary artery disease.  Former smoker, remote.  No history of autoimmune disease such as lupus or rheumatoid arthritis.  No chronic kidney disease.      Walks 3-6 miles at least 5 days per week.      DATA REVIEWED by me:  ECG 11-21-19  Sinus, 64, NS IVCD, baseline wandering.    ECG 10/24/2014  Sinus with frequent PACs, delayed R wave  progression, supraventricular bigeminy, left axis deviation, nonspecific diffuse repolarization abnormality    Holter monitor 2015  No A. Fib, rare PVCs, rare PACs, short runs of paroxysmal atrial tachycardia    Echo 11-11-19  Normal left ventricular chamber size.  Left ventricular ejection fraction is visually estimated to be 70%.  Normal diastolic function.  No significant valve abnormalities.   Normal inferior vena cava size and inspiratory collapse.    Echo 11/11/2018  Normal left ventricular chamber size.  Left ventricular ejection fraction is visually estimated to be 70%.  Normal diastolic function.  No significant valve abnormalities.   Normal inferior vena cava size and inspiratory collapse.  Normal-sized left atrium    Echocardiogram, 11/2/2015:  Normal left ventricular systolic function.  Left ventricular ejection fraction is visually estimated to be 70%.  Mild mitral regurgitation.  Right ventricular systolic pressure is estimated to be 35 mmHg     PET myocardial perfusion imaging, 8/6/2018:  ELECTROCARDIOGRAPHIC FINDINGS:  Show a normal sinus rhythm with no ischemic ST segment changes at rest or peak stress.  SCINTOGRAPHIC FINDINGS:  Show normal homogeneous tracer uptake at rest and peak stress.  GATED WALL MOTION FINDINGS:  Show normal LV systolic function with a resting ejection fraction of 62%, which increased to 72% at peak stress.  CONCLUSIONS AND IMPRESSION:  Normal stress test    Treadmill stress test 10/23/2015  Baseline ECG: normal sinus rhythm, rate 60. No ischemic changes.  Exercise ECG: Good exercise capacity, achieving an adequate  cardiac workload; 9 minutes 15 seconds of a standard Surendra  protocol, 10.6 METS, 85% of Maximum Age Predicted Heart Rate  (MAPHR).   No arrhythmias.  Normal blood pressure response to exercise.   There is no clinical or electrocardiographic evidence of  ischemia.  Rodriges score places the patient at low risk.    Most recent labs:     10/7/2019 sodium 145 5, potassium  4, creatinine 0.85, hemoglobin A1c 5.7, TSH 1.15  2019 HDL low at 37, LDL 78, triglycerides 116, total cholesterol 138  2019 LFTs normal  10/13/2017 hemoglobin 16.1, platelets 179    Past Medical History:   Diagnosis Date   • High cholesterol    • Hyperlipidemia    • Hypertension    • Obesity    • Paroxysmal atrial fibrillation (HCC)    • Unspecified cataract      Past Surgical History:   Procedure Laterality Date   • CATARACT PHACO WITH IOL  2015    Performed by Jakob Garcia M.D. at [facility]   • CATARACT PHACO WITH IOL  2015    Performed by Jakob Garcia M.D. at SURGERY SAME DAY [facility]S   • DUPUYTREN CONTRACTURE RELEASE  2012    Performed by RAFAEL CALVILLO at SURGERY [facility]S   • HAND SURGERY       Family History   Problem Relation Age of Onset   • Other Mother 68        kidney CA   • Heart Disease Mother         CHF at 86     Social History     Socioeconomic History   • Marital status:      Spouse name: Not on file   • Number of children: Not on file   • Years of education: Not on file   • Highest education level: Not on file   Occupational History   • Not on file   Social Needs   • Financial resource strain: Not on file   • Food insecurity:     Worry: Not on file     Inability: Not on file   • Transportation needs:     Medical: Not on file     Non-medical: Not on file   Tobacco Use   • Smoking status: Former Smoker     Years: 5.00     Types: Cigarettes     Last attempt to quit: 1984     Years since quittin.3   • Smokeless tobacco: Former User     Types: Chew     Quit date: 1984   Substance and Sexual Activity   • Alcohol use: Yes     Comment: Occasionally   • Drug use: No   • Sexual activity: Not on file   Lifestyle   • Physical activity:     Days per week: Not on file     Minutes per session: Not on file   • Stress: Not on file   Relationships   • Social connections:     Talks on phone: Not on file     Gets together: Not on file      "Attends [employer] service: Not on file     Active member of club or organization: Not on file     Attends meetings of clubs or organizations: Not on file     Relationship status: Not on file   • Intimate partner violence:     Fear of current or ex partner: Not on file     Emotionally abused: Not on file     Physically abused: Not on file     Forced sexual activity: Not on file   Other Topics Concern   • Not on file   Social History Narrative   • Not on file     No Known Allergies    Current Outpatient Medications   Medication Sig Dispense Refill   • metFORMIN ER (GLUCOPHAGE XR) 500 MG TABLET SR 24 HR Take 500 mg by mouth every day.     • atorvastatin (LIPITOR) 40 MG Tab Take 1 Tab by mouth every day. 90 Tab 3   • ELIQUIS 5 MG Tab TAKE ONE TABLET BY MOUTH TWICE A  Tab 3   • metoprolol (LOPRESSOR) 25 MG Tab Take 1 Tab by mouth 2 times a day. 180 Tab 3   • propafenone (RYTHMOL) 300 MG tablet Take 1 Tab by mouth 1 time daily as needed (for palpitations). 90 Tab 3   • levothyroxine (SYNTHROID) 150 MCG Tab      • vitamin e (VITAMIN E) 400 UNIT Cap Take 400 Units by mouth every day.     • Coenzyme Q10 (CO Q 10) 100 MG CAPS Take 1 Tab by mouth every day.     • B Complex-C (SUPER B COMPLEX PO) Take 1 Tab by mouth every day.     • vitamin D (CHOLECALCIFEROL) 1000 UNIT TABS Take 1,000 Units by mouth every day.       No current facility-administered medications for this visit.        ROS  All others systems reviewed and negative.     Objective:     /90 (BP Location: Left arm, Patient Position: Sitting, BP Cuff Size: Adult)   Pulse 70   Ht 1.778 m (5' 10\")   Wt 122 kg (268 lb 15.4 oz)   SpO2 94%  Body mass index is 38.59 kg/m².    Physical Exam   General: No acute distress. Well nourished.  HEENT: EOM grossly intact, no scleral icterus, no pharyngeal erythema.   Neck:  No JVD, no bruits, trachea midline  CVS: RRR. Normal S1, S2. No M/R/G. No LE edema.  2+ radial pulses, 2+ PT pulses  Resp: CTAB. No wheezing or " crackles/rhonchi. Normal respiratory effort.  Abdomen: Soft, NT, no fatimah hepatomegaly.  MSK/Ext: No clubbing or cyanosis.  Skin: Warm and dry, no rashes.  Neurological: CN III-XII grossly intact. No focal deficits.   Psych: A&O x 3, appropriate affect, good judgement      Assessment:     1. PAF (paroxysmal atrial fibrillation) (HCC)  EKG   2. Chronic anticoagulation     3. Essential hypertension  EKG   4. Dysmetabolic syndrome X     5. Mixed hyperlipidemia  HEPATIC FUNCTION PANEL    Lipid Profile    VITAMIN D 25-HYDROXY   6. Other chest pain     7. Premature atrial contraction  EKG   8. PVC (premature ventricular contraction)  EKG       Medical Decision Making:  Today's Assessment / Status / Plan:     -Consider stopping fenofibrate  -Chads 2 vascular score is 0 or one, not clear, no prior HTN, has IFG, will be 65 next spring. I would support him staying on eliquis or  mg.  -Update lipid therapy  -AAA screening at 65  -If he gets karida mobile and has afib, consider increasing metoprolol    Written instructions given today:    -It would be reasonable to go back to  mg daily in place of apixaban for now until your risk of stroke increases, if you develop diabetes or high blood pressure we would tell you to go back to Apixaban/Eliquis, eventually you will be on apixaban.  -Stop fenofibrate and pravastatin, start atorvastatin (once day or night) 40 mg, watch for aching muscles.  -Repeat cholesterol 3 months to see response, fasting blood work  -You should always hear results of testing within 5 days with my interpretation, if you do not, send a [employer] message or call the office: [phone].  -Questions, concerns, change in symptoms, you can send [employer] message.  -Would be ok to try increasing your metoprolol, start by 37.5 mg AM and PM, 1.5 pills twice a day, then 2 pills twice a day, would be 50 mg AM and PM, max dose is 100 mg AM and PM      Return in about 1 year (around 11/21/2020).    It is my  pleasure to participate in the care of Mr. Villarreal.  Please do not hesitate to contact me with questions or concerns.    Jane Mcgill MD, [facility]  Cardiologist [facility]    Please note that this dictation was created using voice recognition software. I have made every reasonable attempt to correct obvious errors, but it is possible there are errors of grammar and possibly content that I did not discover before finalizing the note.      Robson Ramos D.O.  [address] [address]  VIA Facsimile: [phone]

## 2020-03-05 ENCOUNTER — HOSPITAL ENCOUNTER (OUTPATIENT)
Dept: LAB | Facility: MEDICAL CENTER | Age: 65
End: 2020-03-05
Attending: INTERNAL MEDICINE
Payer: COMMERCIAL

## 2020-03-05 ENCOUNTER — HOSPITAL ENCOUNTER (OUTPATIENT)
Dept: LAB | Facility: MEDICAL CENTER | Age: 65
End: 2020-03-05
Attending: FAMILY MEDICINE
Payer: COMMERCIAL

## 2020-03-05 DIAGNOSIS — E78.2 MIXED HYPERLIPIDEMIA: ICD-10-CM

## 2020-03-05 LAB
25(OH)D3 SERPL-MCNC: 39 NG/ML (ref 30–100)
25(OH)D3 SERPL-MCNC: 45 NG/ML (ref 30–100)
ALBUMIN SERPL BCP-MCNC: 4.1 G/DL (ref 3.2–4.9)
ALBUMIN SERPL BCP-MCNC: 4.3 G/DL (ref 3.2–4.9)
ALBUMIN/GLOB SERPL: 1.5 G/DL
ALP SERPL-CCNC: 51 U/L (ref 30–99)
ALP SERPL-CCNC: 53 U/L (ref 30–99)
ALT SERPL-CCNC: 22 U/L (ref 2–50)
ALT SERPL-CCNC: 22 U/L (ref 2–50)
ANION GAP SERPL CALC-SCNC: 9 MMOL/L (ref 0–11.9)
AST SERPL-CCNC: 18 U/L (ref 12–45)
AST SERPL-CCNC: 18 U/L (ref 12–45)
BASOPHILS # BLD AUTO: 1.5 % (ref 0–1.8)
BASOPHILS # BLD: 0.08 K/UL (ref 0–0.12)
BILIRUB CONJ SERPL-MCNC: 0.1 MG/DL (ref 0.1–0.5)
BILIRUB INDIRECT SERPL-MCNC: 0.5 MG/DL (ref 0–1)
BILIRUB SERPL-MCNC: 0.6 MG/DL (ref 0.1–1.5)
BILIRUB SERPL-MCNC: 0.6 MG/DL (ref 0.1–1.5)
BUN SERPL-MCNC: 14 MG/DL (ref 8–22)
CALCIUM SERPL-MCNC: 9.1 MG/DL (ref 8.5–10.5)
CHLORIDE SERPL-SCNC: 109 MMOL/L (ref 96–112)
CHOLEST SERPL-MCNC: 128 MG/DL (ref 100–199)
CHOLEST SERPL-MCNC: 129 MG/DL (ref 100–199)
CO2 SERPL-SCNC: 27 MMOL/L (ref 20–33)
CREAT SERPL-MCNC: 0.85 MG/DL (ref 0.5–1.4)
EOSINOPHIL # BLD AUTO: 0.07 K/UL (ref 0–0.51)
EOSINOPHIL NFR BLD: 1.3 % (ref 0–6.9)
ERYTHROCYTE [DISTWIDTH] IN BLOOD BY AUTOMATED COUNT: 40.6 FL (ref 35.9–50)
EST. AVERAGE GLUCOSE BLD GHB EST-MCNC: 114 MG/DL
FASTING STATUS PATIENT QL REPORTED: NORMAL
FASTING STATUS PATIENT QL REPORTED: NORMAL
GLOBULIN SER CALC-MCNC: 2.7 G/DL (ref 1.9–3.5)
GLUCOSE SERPL-MCNC: 112 MG/DL (ref 65–99)
HBA1C MFR BLD: 5.6 % (ref 0–5.6)
HCT VFR BLD AUTO: 50.1 % (ref 42–52)
HDLC SERPL-MCNC: 37 MG/DL
HDLC SERPL-MCNC: 37 MG/DL
HGB BLD-MCNC: 16.6 G/DL (ref 14–18)
IMM GRANULOCYTES # BLD AUTO: 0.17 K/UL (ref 0–0.11)
IMM GRANULOCYTES NFR BLD AUTO: 3.2 % (ref 0–0.9)
LDLC SERPL CALC-MCNC: 65 MG/DL
LDLC SERPL CALC-MCNC: 66 MG/DL
LYMPHOCYTES # BLD AUTO: 1.51 K/UL (ref 1–4.8)
LYMPHOCYTES NFR BLD: 28.5 % (ref 22–41)
MCH RBC QN AUTO: 29.8 PG (ref 27–33)
MCHC RBC AUTO-ENTMCNC: 33.1 G/DL (ref 33.7–35.3)
MCV RBC AUTO: 89.9 FL (ref 81.4–97.8)
MONOCYTES # BLD AUTO: 0.37 K/UL (ref 0–0.85)
MONOCYTES NFR BLD AUTO: 7 % (ref 0–13.4)
NEUTROPHILS # BLD AUTO: 3.09 K/UL (ref 1.82–7.42)
NEUTROPHILS NFR BLD: 58.5 % (ref 44–72)
NRBC # BLD AUTO: 0 K/UL
NRBC BLD-RTO: 0 /100 WBC
PLATELET # BLD AUTO: 182 K/UL (ref 164–446)
PMV BLD AUTO: 12 FL (ref 9–12.9)
POTASSIUM SERPL-SCNC: 3.6 MMOL/L (ref 3.6–5.5)
PROT SERPL-MCNC: 6.8 G/DL (ref 6–8.2)
PROT SERPL-MCNC: 6.8 G/DL (ref 6–8.2)
PSA SERPL-MCNC: 0.69 NG/ML (ref 0–4)
RBC # BLD AUTO: 5.57 M/UL (ref 4.7–6.1)
SODIUM SERPL-SCNC: 145 MMOL/L (ref 135–145)
TRIGL SERPL-MCNC: 130 MG/DL (ref 0–149)
TRIGL SERPL-MCNC: 131 MG/DL (ref 0–149)
TSH SERPL DL<=0.005 MIU/L-ACNC: 1.49 UIU/ML (ref 0.38–5.33)
WBC # BLD AUTO: 5.3 K/UL (ref 4.8–10.8)

## 2020-03-05 PROCEDURE — 85025 COMPLETE CBC W/AUTO DIFF WBC: CPT

## 2020-03-05 PROCEDURE — 80053 COMPREHEN METABOLIC PANEL: CPT

## 2020-03-05 PROCEDURE — 83036 HEMOGLOBIN GLYCOSYLATED A1C: CPT

## 2020-03-05 PROCEDURE — 36415 COLL VENOUS BLD VENIPUNCTURE: CPT

## 2020-03-05 PROCEDURE — 80061 LIPID PANEL: CPT | Mod: 91

## 2020-03-05 PROCEDURE — 80076 HEPATIC FUNCTION PANEL: CPT

## 2020-03-05 PROCEDURE — 80061 LIPID PANEL: CPT

## 2020-03-05 PROCEDURE — 82306 VITAMIN D 25 HYDROXY: CPT | Mod: 91

## 2020-03-05 PROCEDURE — 84153 ASSAY OF PSA TOTAL: CPT

## 2020-03-05 PROCEDURE — 82306 VITAMIN D 25 HYDROXY: CPT

## 2020-03-05 PROCEDURE — 84443 ASSAY THYROID STIM HORMONE: CPT

## 2020-04-13 ENCOUNTER — PATIENT MESSAGE (OUTPATIENT)
Dept: CARDIOLOGY | Facility: MEDICAL CENTER | Age: 65
End: 2020-04-13

## 2020-04-15 DIAGNOSIS — I48.0 PAROXYSMAL ATRIAL FIBRILLATION (HCC): ICD-10-CM

## 2020-04-15 RX ORDER — PROPAFENONE HYDROCHLORIDE 150 MG/1
150 TABLET, COATED ORAL 3 TIMES DAILY
Qty: 90 TAB | Refills: 2 | Status: SHIPPED | OUTPATIENT
Start: 2020-04-15 | End: 2020-07-27

## 2020-05-12 ENCOUNTER — TELEPHONE (OUTPATIENT)
Dept: CARDIOLOGY | Facility: MEDICAL CENTER | Age: 65
End: 2020-05-12

## 2020-05-12 NOTE — TELEPHONE ENCOUNTER
Franny from [facility] calling to ensure patient is to be on both Propafenone and Metoprolol.  Per last OV patient is on both medications.

## 2020-05-13 NOTE — TELEPHONE ENCOUNTER
Correct, Propafenone and flecainide are often taken with some AV node blocker to prevent one-to-one atrial flutter, i.e. a ventricular rate of 300 bpm.  Thank you!

## 2020-07-25 DIAGNOSIS — I48.0 PAROXYSMAL ATRIAL FIBRILLATION (HCC): ICD-10-CM

## 2020-07-27 RX ORDER — PROPAFENONE HYDROCHLORIDE 150 MG/1
TABLET, COATED ORAL
Qty: 270 TAB | Refills: 2 | Status: SHIPPED
Start: 2020-07-27 | End: 2020-11-23

## 2020-08-07 DIAGNOSIS — I10 ESSENTIAL HYPERTENSION: ICD-10-CM

## 2020-08-07 RX ORDER — METOPROLOL TARTRATE 37.5 MG/1
37.5 TABLET, FILM COATED ORAL 2 TIMES DAILY
Qty: 180 TAB | Refills: 1 | Status: SHIPPED | OUTPATIENT
Start: 2020-08-07 | End: 2021-02-01 | Stop reason: SDUPTHER

## 2020-08-11 DIAGNOSIS — I48.0 PAF (PAROXYSMAL ATRIAL FIBRILLATION) (HCC): ICD-10-CM

## 2020-09-25 ENCOUNTER — HOSPITAL ENCOUNTER (OUTPATIENT)
Dept: LAB | Facility: MEDICAL CENTER | Age: 65
End: 2020-09-25
Attending: FAMILY MEDICINE
Payer: COMMERCIAL

## 2020-09-25 LAB
25(OH)D3 SERPL-MCNC: 58 NG/ML (ref 30–100)
ALBUMIN SERPL BCP-MCNC: 4.5 G/DL (ref 3.2–4.9)
ALBUMIN/GLOB SERPL: 2 G/DL
ALP SERPL-CCNC: 40 U/L (ref 30–99)
ALT SERPL-CCNC: 29 U/L (ref 2–50)
ANION GAP SERPL CALC-SCNC: 13 MMOL/L (ref 7–16)
AST SERPL-CCNC: 22 U/L (ref 12–45)
BASOPHILS # BLD AUTO: 1.9 % (ref 0–1.8)
BASOPHILS # BLD: 0.12 K/UL (ref 0–0.12)
BILIRUB SERPL-MCNC: 0.6 MG/DL (ref 0.1–1.5)
BUN SERPL-MCNC: 16 MG/DL (ref 8–22)
CALCIUM SERPL-MCNC: 9.5 MG/DL (ref 8.5–10.5)
CHLORIDE SERPL-SCNC: 104 MMOL/L (ref 96–112)
CHOLEST SERPL-MCNC: 169 MG/DL (ref 100–199)
CO2 SERPL-SCNC: 25 MMOL/L (ref 20–33)
CREAT SERPL-MCNC: 0.9 MG/DL (ref 0.5–1.4)
EOSINOPHIL # BLD AUTO: 0.19 K/UL (ref 0–0.51)
EOSINOPHIL NFR BLD: 2.9 % (ref 0–6.9)
ERYTHROCYTE [DISTWIDTH] IN BLOOD BY AUTOMATED COUNT: 41.9 FL (ref 35.9–50)
EST. AVERAGE GLUCOSE BLD GHB EST-MCNC: 114 MG/DL
FASTING STATUS PATIENT QL REPORTED: NORMAL
GLOBULIN SER CALC-MCNC: 2.2 G/DL (ref 1.9–3.5)
GLUCOSE SERPL-MCNC: 94 MG/DL (ref 65–99)
HBA1C MFR BLD: 5.6 % (ref 0–5.6)
HCT VFR BLD AUTO: 46.6 % (ref 42–52)
HDLC SERPL-MCNC: 35 MG/DL
HGB BLD-MCNC: 16.3 G/DL (ref 14–18)
IMM GRANULOCYTES # BLD AUTO: 0.14 K/UL (ref 0–0.11)
IMM GRANULOCYTES NFR BLD AUTO: 2.2 % (ref 0–0.9)
LDLC SERPL CALC-MCNC: 111 MG/DL
LYMPHOCYTES # BLD AUTO: 2.25 K/UL (ref 1–4.8)
LYMPHOCYTES NFR BLD: 34.8 % (ref 22–41)
MCH RBC QN AUTO: 31.3 PG (ref 27–33)
MCHC RBC AUTO-ENTMCNC: 35 G/DL (ref 33.7–35.3)
MCV RBC AUTO: 89.6 FL (ref 81.4–97.8)
MONOCYTES # BLD AUTO: 0.53 K/UL (ref 0–0.85)
MONOCYTES NFR BLD AUTO: 8.2 % (ref 0–13.4)
NEUTROPHILS # BLD AUTO: 3.24 K/UL (ref 1.82–7.42)
NEUTROPHILS NFR BLD: 50 % (ref 44–72)
NRBC # BLD AUTO: 0 K/UL
NRBC BLD-RTO: 0 /100 WBC
PLATELET # BLD AUTO: 159 K/UL (ref 164–446)
PMV BLD AUTO: 13.2 FL (ref 9–12.9)
POTASSIUM SERPL-SCNC: 4 MMOL/L (ref 3.6–5.5)
PROT SERPL-MCNC: 6.7 G/DL (ref 6–8.2)
PSA SERPL-MCNC: 0.75 NG/ML (ref 0–4)
RBC # BLD AUTO: 5.2 M/UL (ref 4.7–6.1)
SODIUM SERPL-SCNC: 142 MMOL/L (ref 135–145)
TRIGL SERPL-MCNC: 117 MG/DL (ref 0–149)
TSH SERPL DL<=0.005 MIU/L-ACNC: 2 UIU/ML (ref 0.38–5.33)
WBC # BLD AUTO: 6.5 K/UL (ref 4.8–10.8)

## 2020-09-25 PROCEDURE — 82306 VITAMIN D 25 HYDROXY: CPT

## 2020-09-25 PROCEDURE — 84443 ASSAY THYROID STIM HORMONE: CPT

## 2020-09-25 PROCEDURE — 83036 HEMOGLOBIN GLYCOSYLATED A1C: CPT

## 2020-09-25 PROCEDURE — 36415 COLL VENOUS BLD VENIPUNCTURE: CPT

## 2020-09-25 PROCEDURE — 85025 COMPLETE CBC W/AUTO DIFF WBC: CPT

## 2020-09-25 PROCEDURE — 80053 COMPREHEN METABOLIC PANEL: CPT

## 2020-09-25 PROCEDURE — 80061 LIPID PANEL: CPT

## 2020-09-25 PROCEDURE — 84153 ASSAY OF PSA TOTAL: CPT

## 2020-11-22 NOTE — PROGRESS NOTES
Subjective:   Chief Complaint:   Chief Complaint   Patient presents with   • Atrial Fibrillation     Follow Up PAF     This evaluation was conducted via [employer] using secure and encrypted videoconferencing technology. The patient was in a private location in the [facility].    The patient's identity was confirmed and verbal consent was obtained for this virtual visit.    Michael Villarreal is a 65 y.o. male who returns today for paroxysmal atrial fibrillation, chronic anticoagulation, hypertension and dyslipidemia.    He recalls waking up in afib, felt heart racing, dizzy, SOB, HR over 160, was given IV meds in the ED.  He had been drinking heavily the night before. This is rare, he rarely drinks. Drinks 2 cups of coffee in the morning.    Afib was happening every 2 weeks, now only about 1 time every 2 months, he is now taking some propefenone and meto BID which helped.  Lasts only a few hours, causes heavily feeling in chest, lies down to rest.  Not taking extra dose of prop but has not needed to yet.  Has [employer], uses this.  He reports repeated minor head concussions but that type of dizziness.    Chads 2 vascular score is 1 or one, not clear, borderline HTN, has IFG.  Remains on apixaban, no bleeding.    Has been on metoprolol only, has Propafenone 300 mg as needed.    Echocardiogram 2019 with normal size left atrium, normal EF, no significant abnormalities.    Had a PET nuclear stress test in 2018 which showed normal perfusion.  Remote Holter 2015 with brief runs of paroxysmal atrial tachycardia but no A. fib, some PVCs and PACs.    No prior HTN, BP has been creeping up, no Dx yet.    Has dyslipidemia, fenofibrate and pravastatin 40 mg.  Had myalgias on statin, LDL was 60s, now 111.  Has been on vascepa now with coupon    No family history of premature coronary artery disease.  Former smoker, remote.  No history of autoimmune disease such as lupus or rheumatoid arthritis.  No chronic kidney  disease.    Walks 3-6 miles at least 5 days per week.    His father    DATA REVIEWED by me:  ECG 11-21-19  Sinus, 64, NS IVCD, baseline wandering.    ECG 10/24/2014  Sinus with frequent PACs, delayed R wave progression, supraventricular bigeminy, left axis deviation, nonspecific diffuse repolarization abnormality    Holter monitor 2015  No A. Fib, rare PVCs, rare PACs, short runs of paroxysmal atrial tachycardia    Echo 11-11-19  Normal left ventricular chamber size.  Left ventricular ejection fraction is visually estimated to be 70%.  Normal diastolic function.  No significant valve abnormalities.   Normal inferior vena cava size and inspiratory collapse.    Echo 11/11/2018  Normal left ventricular chamber size.  Left ventricular ejection fraction is visually estimated to be 70%.  Normal diastolic function.  No significant valve abnormalities.   Normal inferior vena cava size and inspiratory collapse.  Normal-sized left atrium    Echocardiogram, 11/2/2015:  Normal left ventricular systolic function.  Left ventricular ejection fraction is visually estimated to be 70%.  Mild mitral regurgitation.  Right ventricular systolic pressure is estimated to be 35 mmHg     PET myocardial perfusion imaging, 8/6/2018:  ELECTROCARDIOGRAPHIC FINDINGS:  Show a normal sinus rhythm with no ischemic ST segment changes at rest or peak stress.  SCINTOGRAPHIC FINDINGS:  Show normal homogeneous tracer uptake at rest and peak stress.  GATED WALL MOTION FINDINGS:  Show normal LV systolic function with a resting ejection fraction of 62%, which increased to 72% at peak stress.  CONCLUSIONS AND IMPRESSION:  Normal stress test    Treadmill stress test 10/23/2015  Baseline ECG: normal sinus rhythm, rate 60. No ischemic changes.  Exercise ECG: Good exercise capacity, achieving an adequate  cardiac workload; 9 minutes 15 seconds of a standard Surendra  protocol, 10.6 METS, 85% of Maximum Age Predicted Heart Rate  (MAPHR).   No arrhythmias.  Normal  blood pressure response to exercise.   There is no clinical or electrocardiographic evidence of  ischemia.  Rodriges score places the patient at low risk.    Most recent labs:     Lab Results   Component Value Date/Time    HEMOGLOBIN 16.3 09/25/2020 06:09 AM    HEMATOCRIT 46.6 09/25/2020 06:09 AM    MCV 89.6 09/25/2020 06:09 AM    INR 0.95 10/28/2014 08:27 PM      Lab Results   Component Value Date/Time    SODIUM 142 09/25/2020 06:09 AM    POTASSIUM 4.0 09/25/2020 06:09 AM    CHLORIDE 104 09/25/2020 06:09 AM    CO2 25 09/25/2020 06:09 AM    GLUCOSE 94 09/25/2020 06:09 AM    BUN 16 09/25/2020 06:09 AM    CREATININE 0.90 09/25/2020 06:09 AM      Lab Results   Component Value Date/Time    ASTSGOT 22 09/25/2020 06:09 AM    ALTSGPT 29 09/25/2020 06:09 AM    ALBUMIN 4.5 09/25/2020 06:09 AM      Lab Results   Component Value Date/Time    CHOLSTRLTOT 169 09/25/2020 06:09 AM     (H) 09/25/2020 06:09 AM    HDL 35 (A) 09/25/2020 06:09 AM    TRIGLYCERIDE 117 09/25/2020 06:09 AM       10/7/2019 sodium 145 5, potassium 4, creatinine 0.85, hemoglobin A1c 5.7, TSH 1.15  9/19/2019 HDL low at 37, LDL 78, triglycerides 116, total cholesterol 138  7/18/2019 LFTs normal  10/13/2017 hemoglobin 16.1, platelets 179    Past Medical History:   Diagnosis Date   • High cholesterol    • Hyperlipidemia    • Hypertension    • Obesity    • Paroxysmal atrial fibrillation (HCC)    • Unspecified cataract      Past Surgical History:   Procedure Laterality Date   • CATARACT PHACO WITH IOL  4/20/2015    Performed by Jakob Garcia M.D. [facility]   • CATARACT PHACO WITH IOL  4/1/2015    Performed by Jakob Garcia M.D. at SURGERY SAME DAY [facility] ORS   • DUPUYTREN CONTRACTURE RELEASE  7/17/2012    Performed by RAFAEL CALVILLO at SURGERY [facility]S   • HAND SURGERY       Family History   Problem Relation Age of Onset   • Other Mother 68        kidney CA   • Heart Disease Mother         CHF at 86     Social History      Socioeconomic History   • Marital status:      Spouse name: Not on file   • Number of children: Not on file   • Years of education: Not on file   • Highest education level: Not on file   Occupational History   • Not on file   Social Needs   • Financial resource strain: Not on file   • Food insecurity     Worry: Not on file     Inability: Not on file   • Transportation needs     Medical: Not on file     Non-medical: Not on file   Tobacco Use   • Smoking status: Former Smoker     Years: 5.00     Types: Cigarettes     Quit date: 1984     Years since quittin.3   • Smokeless tobacco: Former User     Types: Chew     Quit date: 1984   Substance and Sexual Activity   • Alcohol use: Yes     Comment: Occasionally   • Drug use: No   • Sexual activity: Not on file   Lifestyle   • Physical activity     Days per week: Not on file     Minutes per session: Not on file   • Stress: Not on file   Relationships   • Social connections     Talks on phone: Not on file     Gets together: Not on file     Attends Catholic service: Not on file     Active member of club or organization: Not on file     Attends meetings of clubs or organizations: Not on file     Relationship status: Not on file   • Intimate partner violence     Fear of current or ex partner: Not on file     Emotionally abused: Not on file     Physically abused: Not on file     Forced sexual activity: Not on file   Other Topics Concern   • Not on file   Social History Narrative   • Not on file     Allergies   Allergen Reactions   • Lipitor [Atorvastatin]      Myalgias       Current Outpatient Medications   Medication Sig Dispense Refill   • fenofibrate (ANTARA) 130 MG capsule      • VASCEPA 1 g Cap      • pravastatin (PRAVACHOL) 40 MG tablet      • flecainide (TAMBOCOR) 50 MG tablet Take 1 Tab by mouth 2 times a day. 200 Tab 3   • apixaban (ELIQUIS) 5mg Tab Take 1 Tab by mouth 2 Times a Day. TAKE ONE TABLET BY MOUTH TWICE A  Tab 1   • metoprolol  "37.5 MG Tab Take 37.5 mg by mouth 2 times a day. 180 Tab 1   • metFORMIN ER (GLUCOPHAGE XR) 500 MG TABLET SR 24 HR Take 500 mg by mouth every day.     • propafenone (RYTHMOL) 300 MG tablet Take 1 Tab by mouth 1 time daily as needed (for palpitations). 90 Tab 3   • levothyroxine (SYNTHROID) 150 MCG Tab      • vitamin e (VITAMIN E) 400 UNIT Cap Take 400 Units by mouth every day.     • Coenzyme Q10 (CO Q 10) 100 MG CAPS Take 1 Tab by mouth every day.     • B Complex-C (SUPER B COMPLEX PO) Take 1 Tab by mouth every day.     • vitamin D (CHOLECALCIFEROL) 1000 UNIT TABS Take 1,000 Units by mouth every day.       No current facility-administered medications for this visit.        ROS    All others systems reviewed and negative.     Objective:     /85 (BP Location: Left arm, Patient Position: Sitting, BP Cuff Size: Adult)   Pulse 65   Ht 1.778 m (5' 10\")   Wt 117.9 kg (260 lb)  Body mass index is 37.31 kg/m².    Vitals obtained by patient:    Physical Exam:  General: No acute distress. Well nourished.   HEENT: EOM grossly intact, no scleral icterus, no pharyngeal erythema.   Neck:  No JVD noted at 90 degrees, trachea midline  CVS: Pulse as reported by patient, no visible LE edema.  Resp: Unlabored respiratory effort, no cough or audible wheeze  MSK/Ext: No clubbing or cyanosis visible appreciated.  Skin: No rashes in visible areas.  Neurological: CN III-XII grossly intact. No focal deficits.   Psych: A&O x 3, appropriate affect, good judgement, well groomed        Assessment:     1. Essential hypertension     2. PAF (paroxysmal atrial fibrillation) (HCC)     3. Chronic anticoagulation     4. Dysmetabolic syndrome X     5. Mixed hyperlipidemia     6. Other chest pain     7. Premature atrial contraction     8. PVC (premature ventricular contraction)     9. Non morbid obesity due to excess calories     10. Dyslipidemia     11. Former tobacco use     12. Long term current use of antiarrhythmic drug         Medical " Decision Making:  Today's Assessment / Status / Plan:     -Consider stopping fenofibrate and stay on statin but defer to PCP  -He is on Vascepa now w/o indication for mortality, has a coupon,  I have told him not to pay full price for this  -Chads 2 vascular score is 1-2, on Apixaban  -Does not think he has FORREST  -AAA screening at 65, we will wait until covid is over  -RTC 6 months    Written instructions given today:    -Flecainide 50 MG AM and PM, swap when you are ready. Stay on metoprolol also    -The max dose of flecainide is 150 mg AM and PM, you can take an extra dose of flecainide as needed, also you can increase your own dose to 100 mg AM and/or PM if you are having more afib. This medication should last longer than your propafenone.    -If you need to change back to propafenone, let me know, send [employer] message.    Return in about 6 months (around 5/23/2021).    It is my pleasure to participate in the care of Mr. Villarreal.  Please do not hesitate to contact me with questions or concerns.    Jane Mcgill MD, [facility]  Cardiologist [facility]    Please note that this dictation was created using voice recognition software. I have made every reasonable attempt to correct obvious errors, but it is possible there are errors of grammar and possibly content that I did not discover before finalizing the note.

## 2020-11-23 ENCOUNTER — TELEMEDICINE (OUTPATIENT)
Dept: CARDIOLOGY | Facility: MEDICAL CENTER | Age: 65
End: 2020-11-23
Payer: COMMERCIAL

## 2020-11-23 VITALS
WEIGHT: 260 LBS | SYSTOLIC BLOOD PRESSURE: 131 MMHG | BODY MASS INDEX: 37.22 KG/M2 | HEIGHT: 70 IN | HEART RATE: 65 BPM | DIASTOLIC BLOOD PRESSURE: 85 MMHG

## 2020-11-23 DIAGNOSIS — E66.09 NON MORBID OBESITY DUE TO EXCESS CALORIES: ICD-10-CM

## 2020-11-23 DIAGNOSIS — I49.1 PREMATURE ATRIAL CONTRACTION: ICD-10-CM

## 2020-11-23 DIAGNOSIS — R07.89 OTHER CHEST PAIN: ICD-10-CM

## 2020-11-23 DIAGNOSIS — E88.810 DYSMETABOLIC SYNDROME X: ICD-10-CM

## 2020-11-23 DIAGNOSIS — E78.2 MIXED HYPERLIPIDEMIA: ICD-10-CM

## 2020-11-23 DIAGNOSIS — I49.3 PVC (PREMATURE VENTRICULAR CONTRACTION): ICD-10-CM

## 2020-11-23 DIAGNOSIS — Z79.01 CHRONIC ANTICOAGULATION: ICD-10-CM

## 2020-11-23 DIAGNOSIS — Z79.899 LONG TERM CURRENT USE OF ANTIARRHYTHMIC DRUG: ICD-10-CM

## 2020-11-23 DIAGNOSIS — Z87.891 FORMER TOBACCO USE: ICD-10-CM

## 2020-11-23 DIAGNOSIS — I10 ESSENTIAL HYPERTENSION: ICD-10-CM

## 2020-11-23 DIAGNOSIS — E78.5 DYSLIPIDEMIA: ICD-10-CM

## 2020-11-23 DIAGNOSIS — I48.0 PAF (PAROXYSMAL ATRIAL FIBRILLATION) (HCC): ICD-10-CM

## 2020-11-23 PROCEDURE — 99214 OFFICE O/P EST MOD 30 MIN: CPT | Mod: 95,CR | Performed by: INTERNAL MEDICINE

## 2020-11-23 RX ORDER — PRAVASTATIN SODIUM 40 MG
TABLET ORAL
COMMUNITY
Start: 2020-09-10 | End: 2021-06-03 | Stop reason: SDUPTHER

## 2020-11-23 RX ORDER — ICOSAPENT ETHYL 1000 MG/1
CAPSULE ORAL
COMMUNITY
Start: 2020-09-10 | End: 2021-06-03

## 2020-11-23 RX ORDER — FLECAINIDE ACETATE 50 MG/1
50 TABLET ORAL 2 TIMES DAILY
Qty: 200 TAB | Refills: 3 | Status: SHIPPED
Start: 2020-11-23 | End: 2021-01-06

## 2020-11-23 RX ORDER — FENOFIBRATE 130 MG/1
CAPSULE ORAL
COMMUNITY
Start: 2020-09-29 | End: 2021-06-03

## 2020-11-23 ASSESSMENT — FIBROSIS 4 INDEX: FIB4 SCORE: 1.67

## 2020-11-23 NOTE — LETTER
[facility]   [address], [address]  [place] [address]  Phone: [phone]  Fax: [phone]              Michael Villarreal  1955    Encounter Date: 11/23/2020    Jane Mcgill M.D.          PROGRESS NOTE:  Subjective:   Chief Complaint:   Chief Complaint   Patient presents with   • Atrial Fibrillation     Follow Up PAF     This evaluation was conducted via tolingo using secure and encrypted videoconferencing technology. The patient was in a private location in the [facility].    The patient's identity was confirmed and verbal consent was obtained for this virtual visit.    Michael Villarreal is a 65 y.o. male who returns today for paroxysmal atrial fibrillation, chronic anticoagulation, hypertension and dyslipidemia.    He recalls waking up in afib, felt heart racing, dizzy, SOB, HR over 160, was given IV meds in the ED.  He had been drinking heavily the night before. This is rare, he rarely drinks. Drinks 2 cups of coffee in the morning.    Afib was happening every 2 weeks, now only about 1 time every 2 months, he is now taking some propefenone and meto BID which helped.  Lasts only a few hours, causes heavily feeling in chest, lies down to rest.  Not taking extra dose of prop but has not needed to yet.  Has Qoof, uses this.  He reports repeated minor head concussions but that type of dizziness.    Chads 2 vascular score is 1 or one, not clear, borderline HTN, has IFG.  Remains on apixaban, no bleeding.    Has been on metoprolol only, has Propafenone 300 mg as needed.    Echocardiogram 2019 with normal size left atrium, normal EF, no significant abnormalities.    Had a PET nuclear stress test in 2018 which showed normal perfusion.  Remote Holter 2015 with brief runs of paroxysmal atrial tachycardia but no A. fib, some PVCs and PACs.    No prior HTN, BP has been creeping up, no Dx yet.    Has dyslipidemia, fenofibrate and pravastatin 40  mg.  Had myalgias on statin, LDL was 60s, now 111.  Has been on vascepa now with coupon    No family history of premature coronary artery disease.  Former smoker, remote.  No history of autoimmune disease such as lupus or rheumatoid arthritis.  No chronic kidney disease.    Walks 3-6 miles at least 5 days per week.    His father    DATA REVIEWED by me:  ECG 11-21-19  Sinus, 64, NS IVCD, baseline wandering.    ECG 10/24/2014  Sinus with frequent PACs, delayed R wave progression, supraventricular bigeminy, left axis deviation, nonspecific diffuse repolarization abnormality    Holter monitor 2015  No A. Fib, rare PVCs, rare PACs, short runs of paroxysmal atrial tachycardia    Echo 11-11-19  Normal left ventricular chamber size.  Left ventricular ejection fraction is visually estimated to be 70%.  Normal diastolic function.  No significant valve abnormalities.   Normal inferior vena cava size and inspiratory collapse.    Echo 11/11/2018  Normal left ventricular chamber size.  Left ventricular ejection fraction is visually estimated to be 70%.  Normal diastolic function.  No significant valve abnormalities.   Normal inferior vena cava size and inspiratory collapse.  Normal-sized left atrium    Echocardiogram, 11/2/2015:  Normal left ventricular systolic function.  Left ventricular ejection fraction is visually estimated to be 70%.  Mild mitral regurgitation.  Right ventricular systolic pressure is estimated to be 35 mmHg     PET myocardial perfusion imaging, 8/6/2018:  ELECTROCARDIOGRAPHIC FINDINGS:  Show a normal sinus rhythm with no ischemic ST segment changes at rest or peak stress.  SCINTOGRAPHIC FINDINGS:  Show normal homogeneous tracer uptake at rest and peak stress.  GATED WALL MOTION FINDINGS:  Show normal LV systolic function with a resting ejection fraction of 62%, which increased to 72% at peak stress.  CONCLUSIONS AND IMPRESSION:  Normal stress test    Treadmill stress test 10/23/2015  Baseline ECG: normal  sinus rhythm, rate 60. No ischemic changes.  Exercise ECG: Good exercise capacity, achieving an adequate  cardiac workload; 9 minutes 15 seconds of a standard Surendra  protocol, 10.6 METS, 85% of Maximum Age Predicted Heart Rate  (MAPHR).   No arrhythmias.  Normal blood pressure response to exercise.   There is no clinical or electrocardiographic evidence of  ischemia.  Rodriges score places the patient at low risk.    Most recent labs:     Lab Results   Component Value Date/Time    HEMOGLOBIN 16.3 09/25/2020 06:09 AM    HEMATOCRIT 46.6 09/25/2020 06:09 AM    MCV 89.6 09/25/2020 06:09 AM    INR 0.95 10/28/2014 08:27 PM      Lab Results   Component Value Date/Time    SODIUM 142 09/25/2020 06:09 AM    POTASSIUM 4.0 09/25/2020 06:09 AM    CHLORIDE 104 09/25/2020 06:09 AM    CO2 25 09/25/2020 06:09 AM    GLUCOSE 94 09/25/2020 06:09 AM    BUN 16 09/25/2020 06:09 AM    CREATININE 0.90 09/25/2020 06:09 AM      Lab Results   Component Value Date/Time    ASTSGOT 22 09/25/2020 06:09 AM    ALTSGPT 29 09/25/2020 06:09 AM    ALBUMIN 4.5 09/25/2020 06:09 AM      Lab Results   Component Value Date/Time    CHOLSTRLTOT 169 09/25/2020 06:09 AM     (H) 09/25/2020 06:09 AM    HDL 35 (A) 09/25/2020 06:09 AM    TRIGLYCERIDE 117 09/25/2020 06:09 AM       10/7/2019 sodium 145 5, potassium 4, creatinine 0.85, hemoglobin A1c 5.7, TSH 1.15  9/19/2019 HDL low at 37, LDL 78, triglycerides 116, total cholesterol 138  7/18/2019 LFTs normal  10/13/2017 hemoglobin 16.1, platelets 179    Past Medical History:   Diagnosis Date   • High cholesterol    • Hyperlipidemia    • Hypertension    • Obesity    • Paroxysmal atrial fibrillation (HCC)    • Unspecified cataract      Past Surgical History:   Procedure Laterality Date   • CATARACT PHACO WITH IOL  4/20/2015    Performed by Jakob Garcia M.D. at SURGERY SURGICAL [facility] ORS   • CATARACT PHACO WITH IOL  4/1/2015    Performed by Jakob Garcia M.D. at SURGERY SAME DAY [facility] ORS   • TOMASA  CONTRACTURE RELEASE  2012    Performed by RAFAEL CALVILLO at [facility]GERY [facility]   • HAND SURGERY       Family History   Problem Relation Age of Onset   • Other Mother 68        kidney CA   • Heart Disease Mother         CHF at 86     Social History     Socioeconomic History   • Marital status:      Spouse name: Not on file   • Number of children: Not on file   • Years of education: Not on file   • Highest education level: Not on file   Occupational History   • Not on file   Social Needs   • Financial resource strain: Not on file   • Food insecurity     Worry: Not on file     Inability: Not on file   • Transportation needs     Medical: Not on file     Non-medical: Not on file   Tobacco Use   • Smoking status: Former Smoker     Years: 5.00     Types: Cigarettes     Quit date: 1984     Years since quittin.3   • Smokeless tobacco: Former User     Types: Chew     Quit date: 1984   Substance and Sexual Activity   • Alcohol use: Yes     Comment: Occasionally   • Drug use: No   • Sexual activity: Not on file   Lifestyle   • Physical activity     Days per week: Not on file     Minutes per session: Not on file   • Stress: Not on file   Relationships   • Social connections     Talks on phone: Not on file     Gets together: Not on file     Attends Congregation service: Not on file     Active member of club or organization: Not on file     Attends meetings of clubs or organizations: Not on file     Relationship status: Not on file   • Intimate partner violence     Fear of current or ex partner: Not on file     Emotionally abused: Not on file     Physically abused: Not on file     Forced sexual activity: Not on file   Other Topics Concern   • Not on file   Social History Narrative   • Not on file     Allergies   Allergen Reactions   • Lipitor [Atorvastatin]      Myalgias       Current Outpatient Medications   Medication Sig Dispense Refill   • fenofibrate (ANTARA) 130 MG capsule      • VASCEPA 1 g Cap  "     • pravastatin (PRAVACHOL) 40 MG tablet      • flecainide (TAMBOCOR) 50 MG tablet Take 1 Tab by mouth 2 times a day. 200 Tab 3   • apixaban (ELIQUIS) 5mg Tab Take 1 Tab by mouth 2 Times a Day. TAKE ONE TABLET BY MOUTH TWICE A  Tab 1   • metoprolol 37.5 MG Tab Take 37.5 mg by mouth 2 times a day. 180 Tab 1   • metFORMIN ER (GLUCOPHAGE XR) 500 MG TABLET SR 24 HR Take 500 mg by mouth every day.     • propafenone (RYTHMOL) 300 MG tablet Take 1 Tab by mouth 1 time daily as needed (for palpitations). 90 Tab 3   • levothyroxine (SYNTHROID) 150 MCG Tab      • vitamin e (VITAMIN E) 400 UNIT Cap Take 400 Units by mouth every day.     • Coenzyme Q10 (CO Q 10) 100 MG CAPS Take 1 Tab by mouth every day.     • B Complex-C (SUPER B COMPLEX PO) Take 1 Tab by mouth every day.     • vitamin D (CHOLECALCIFEROL) 1000 UNIT TABS Take 1,000 Units by mouth every day.       No current facility-administered medications for this visit.        ROS    All others systems reviewed and negative.     Objective:     /85 (BP Location: Left arm, Patient Position: Sitting, BP Cuff Size: Adult)   Pulse 65   Ht 1.778 m (5' 10\")   Wt 117.9 kg (260 lb)  Body mass index is 37.31 kg/m².    Vitals obtained by patient:    Physical Exam:  General: No acute distress. Well nourished.   HEENT: EOM grossly intact, no scleral icterus, no pharyngeal erythema.   Neck:  No JVD noted at 90 degrees, trachea midline  CVS: Pulse as reported by patient, no visible LE edema.  Resp: Unlabored respiratory effort, no cough or audible wheeze  MSK/Ext: No clubbing or cyanosis visible appreciated.  Skin: No rashes in visible areas.  Neurological: CN III-XII grossly intact. No focal deficits.   Psych: A&O x 3, appropriate affect, good judgement, well groomed        Assessment:     1. Essential hypertension     2. PAF (paroxysmal atrial fibrillation) (HCC)     3. Chronic anticoagulation     4. Dysmetabolic syndrome X     5. Mixed hyperlipidemia     6. Other chest " pain     7. Premature atrial contraction     8. PVC (premature ventricular contraction)     9. Non morbid obesity due to excess calories     10. Dyslipidemia     11. Former tobacco use     12. Long term current use of antiarrhythmic drug         Medical Decision Making:  Today's Assessment / Status / Plan:     -Consider stopping fenofibrate and stay on statin but defer to PCP  -He is on Vascepa now w/o indication for mortality, has a coupon,  I have told him not to pay full price for this  -Chads 2 vascular score is 1-2, on Apixaban  -Does not think he has FORREST  -AAA screening at 65, we will wait until covid is over  -RTC 6 months    Written instructions given today:    -Flecainide 50 MG AM and PM, swap when you are ready. Stay on metoprolol also    -The max dose of flecainide is 150 mg AM and PM, you can take an extra dose of flecainide as needed, also you can increase your own dose to 100 mg AM and/or PM if you are having more afib. This medication should last longer than your propafenone.    -If you need to change back to propafenone, let me know, send [employer] message.    Return in about 6 months (around 5/23/2021).    It is my pleasure to participate in the care of Mr. Villarreal.  Please do not hesitate to contact me with questions or concerns.    Jane Mcgill MD, [facility]  Cardiologist [facility]    Please note that this dictation was created using voice recognition software. I have made every reasonable attempt to correct obvious errors, but it is possible there are errors of grammar and possibly content that I did not discover before finalizing the note.        Robson Ramos D.O.  [address] [place] [address]  Via Fax: [phone]

## 2020-11-23 NOTE — PATIENT INSTRUCTIONS
-Flecainide 50 MG AM and PM, swap when you are ready. Stay on metoprolol also    -The max dose of flecainide is 150 mg AM and PM, you can take an extra dose of flecainide as needed, also you can increase your own dose to 100 mg AM and/or PM if you are having more afib. This medication should last longer than your propafenone.    -If you need to change back to propafenone, let me know, send [employer] message.

## 2021-01-02 ENCOUNTER — PATIENT MESSAGE (OUTPATIENT)
Dept: CARDIOLOGY | Facility: MEDICAL CENTER | Age: 66
End: 2021-01-02

## 2021-01-05 ENCOUNTER — PATIENT MESSAGE (OUTPATIENT)
Dept: CARDIOLOGY | Facility: MEDICAL CENTER | Age: 66
End: 2021-01-05

## 2021-01-05 DIAGNOSIS — I48.0 PAROXYSMAL ATRIAL FIBRILLATION (HCC): ICD-10-CM

## 2021-01-05 RX ORDER — PROPAFENONE HYDROCHLORIDE 300 MG/1
300 TABLET, COATED ORAL
Qty: 90 TAB | Refills: 3 | Status: SHIPPED
Start: 2021-01-05 | End: 2021-06-03

## 2021-01-06 ENCOUNTER — TELEPHONE (OUTPATIENT)
Dept: CARDIOLOGY | Facility: MEDICAL CENTER | Age: 66
End: 2021-01-06

## 2021-01-07 NOTE — TELEPHONE ENCOUNTER
TO: 4p/Wed/Ofc  NM: Franny   HOSP: [facility]   PH: [phone]   PT NM: Michael Villarreal   : 55   REG DR: Dr Mcgill   RE: Drug interaction concerns.   DISP HIST: 2021 03:31P AF

## 2021-01-07 NOTE — TELEPHONE ENCOUNTER
Returned Franny's call. She wanted to verify that the pt is switching to Rythmol and d/c'ing flecainide, confirmed this. She also wanted to make sure pt can take Rythmol w/ metoprolol d/t the interaction. TIFFANI has already confirmed pt can take both, informed Franny of this.

## 2021-02-01 DIAGNOSIS — I10 ESSENTIAL HYPERTENSION: ICD-10-CM

## 2021-02-01 DIAGNOSIS — I48.0 PAF (PAROXYSMAL ATRIAL FIBRILLATION) (HCC): ICD-10-CM

## 2021-02-01 RX ORDER — METOPROLOL TARTRATE 37.5 MG/1
37.5 TABLET, FILM COATED ORAL 2 TIMES DAILY
Qty: 180 TAB | Refills: 1 | Status: SHIPPED | OUTPATIENT
Start: 2021-02-01 | End: 2021-06-03 | Stop reason: SDUPTHER

## 2021-03-03 DIAGNOSIS — Z23 NEED FOR VACCINATION: ICD-10-CM

## 2021-06-03 ENCOUNTER — HOSPITAL ENCOUNTER (OUTPATIENT)
Dept: LAB | Facility: MEDICAL CENTER | Age: 66
End: 2021-06-03
Attending: INTERNAL MEDICINE
Payer: COMMERCIAL

## 2021-06-03 ENCOUNTER — OFFICE VISIT (OUTPATIENT)
Dept: CARDIOLOGY | Facility: MEDICAL CENTER | Age: 66
End: 2021-06-03
Payer: COMMERCIAL

## 2021-06-03 VITALS
HEIGHT: 70 IN | BODY MASS INDEX: 40.52 KG/M2 | DIASTOLIC BLOOD PRESSURE: 74 MMHG | HEART RATE: 60 BPM | WEIGHT: 283 LBS | SYSTOLIC BLOOD PRESSURE: 126 MMHG | RESPIRATION RATE: 14 BRPM | OXYGEN SATURATION: 95 %

## 2021-06-03 DIAGNOSIS — I48.0 PAF (PAROXYSMAL ATRIAL FIBRILLATION) (HCC): ICD-10-CM

## 2021-06-03 DIAGNOSIS — I49.3 PVC (PREMATURE VENTRICULAR CONTRACTION): ICD-10-CM

## 2021-06-03 DIAGNOSIS — I10 ESSENTIAL HYPERTENSION: ICD-10-CM

## 2021-06-03 DIAGNOSIS — E88.810 DYSMETABOLIC SYNDROME X: ICD-10-CM

## 2021-06-03 DIAGNOSIS — E78.2 MIXED HYPERLIPIDEMIA: ICD-10-CM

## 2021-06-03 DIAGNOSIS — Z87.891 FORMER TOBACCO USE: ICD-10-CM

## 2021-06-03 DIAGNOSIS — G47.33 OSA (OBSTRUCTIVE SLEEP APNEA): ICD-10-CM

## 2021-06-03 DIAGNOSIS — R07.89 OTHER CHEST PAIN: ICD-10-CM

## 2021-06-03 DIAGNOSIS — Z79.01 CHRONIC ANTICOAGULATION: ICD-10-CM

## 2021-06-03 DIAGNOSIS — R60.0 LOWER EXTREMITY EDEMA: ICD-10-CM

## 2021-06-03 DIAGNOSIS — Z79.899 LONG TERM CURRENT USE OF ANTIARRHYTHMIC DRUG: ICD-10-CM

## 2021-06-03 LAB
ANION GAP SERPL CALC-SCNC: 8 MMOL/L (ref 7–16)
BUN SERPL-MCNC: 13 MG/DL (ref 8–22)
CALCIUM SERPL-MCNC: 9.1 MG/DL (ref 8.5–10.5)
CHLORIDE SERPL-SCNC: 110 MMOL/L (ref 96–112)
CO2 SERPL-SCNC: 26 MMOL/L (ref 20–33)
CREAT SERPL-MCNC: 0.93 MG/DL (ref 0.5–1.4)
GLUCOSE SERPL-MCNC: 97 MG/DL (ref 65–99)
MAGNESIUM SERPL-MCNC: 2.3 MG/DL (ref 1.5–2.5)
POTASSIUM SERPL-SCNC: 4 MMOL/L (ref 3.6–5.5)
SODIUM SERPL-SCNC: 144 MMOL/L (ref 135–145)

## 2021-06-03 PROCEDURE — 83735 ASSAY OF MAGNESIUM: CPT

## 2021-06-03 PROCEDURE — 99215 OFFICE O/P EST HI 40 MIN: CPT | Performed by: INTERNAL MEDICINE

## 2021-06-03 PROCEDURE — 36415 COLL VENOUS BLD VENIPUNCTURE: CPT

## 2021-06-03 PROCEDURE — 80048 BASIC METABOLIC PNL TOTAL CA: CPT

## 2021-06-03 RX ORDER — PRAVASTATIN SODIUM 40 MG
40 TABLET ORAL DAILY
Qty: 90 TABLET | Refills: 7 | Status: SHIPPED | OUTPATIENT
Start: 2021-06-03 | End: 2021-12-10 | Stop reason: SDUPTHER

## 2021-06-03 RX ORDER — PROPAFENONE HYDROCHLORIDE 150 MG/1
150 TABLET, COATED ORAL EVERY 8 HOURS
Qty: 270 TABLET | Refills: 7 | Status: SHIPPED | OUTPATIENT
Start: 2021-06-03 | End: 2021-12-10

## 2021-06-03 RX ORDER — METOPROLOL TARTRATE 37.5 MG/1
25 TABLET, FILM COATED ORAL 2 TIMES DAILY
Qty: 180 TABLET | Refills: 7 | Status: SHIPPED | OUTPATIENT
Start: 2021-06-03 | End: 2021-12-10

## 2021-06-03 ASSESSMENT — FIBROSIS 4 INDEX: FIB4 SCORE: 1.7

## 2021-06-03 NOTE — LETTER
[facility]   [address], [address]  [place] [address]  Phone: [phone]  Fax: [phone]              Michael Villarreal  1955    Encounter Date: 6/3/2021    Jane Mcgill M.D.          PROGRESS NOTE:  Subjective:   Chief Complaint:   Chief Complaint   Patient presents with   • Hyperlipidemia   • Atrial Fibrillation     F/V Dx: PAF (paroxysmal atrial fibrillation) (HCC)       Michael Villarreal is a 66 y.o. male who returns today for symp paroxysmal atrial fibrillation, chronic anticoagulation, hypertension, HLP, statin intolerance, today more PAF and now LE edema.    Getting afib, up to 3 hours (reviewed [facility] mobile strips today), feels heart racing, lightheaded, SOB, . Prior to meds, afib was over 160.  He had been drinking heavily the night before. He does not drink.     He had been doing propafenone PRN and meto BID but now having breakthrough.    We tried flecainide twice daily but this was not effective.  Used to walk 3-6 miles at least 5 days per week.  Knee injury, plantar fasciitis.    TSH ok 9-2020.    Chads 2 vascular score is 2, not clear, borderline HTN, has IFG.  Remains on apixaban, no bleeding.    Echocardiogram 2019 with normal size left atrium, normal EF, no significant abnormalities.    Had a PET nuclear stress test in 2018 which showed normal perfusion.  Remote Holter 2015 with brief runs of paroxysmal atrial tachycardia but no A. fib, some PVCs and PACs.    Probably HTN, on metoprolol, controlled.    Has HLP, fenofibrate and pravastatin 40 mg.  Had myalgias on statin, LDL was 60s, now 111, HDL low.  Had been on  vascepa with  Coupon but not indicated.  Has not had pancreatitis or triglycerides greater than 500.    No family history of premature coronary artery disease.  Former smoker, remote.  No history of autoimmune disease such as lupus or rheumatoid arthritis.  No chronic kidney disease.    Again, limited by knee/foot  pain/sciatica.  Has mild BARROSO and now some LE edema.  He is not limited by chest pain, pressure or tightness with activity.   No orthopnea.   No presyncope/syncope.   No symptoms of leg claudication.   No stroke/TIA like symptoms.    His father still alive.  Mother  of CHF at 87.    .    DATA REVIEWED by me:  ECG 19  Sinus, 64, NS IVCD, baseline wandering.    ECG 10/24/2014  Sinus with frequent PACs, delayed R wave progression, supraventricular bigeminy, left axis deviation, nonspecific diffuse repolarization abnormality    Holter monitor   No A. Fib, rare PVCs, rare PACs, short runs of paroxysmal atrial tachycardia    Echo 19  Normal left ventricular chamber size.  Left ventricular ejection fraction is visually estimated to be 70%.  Normal diastolic function.  No significant valve abnormalities.   Normal inferior vena cava size and inspiratory collapse.    Echo 2018  Normal left ventricular chamber size.  Left ventricular ejection fraction is visually estimated to be 70%.  Normal diastolic function.  No significant valve abnormalities.   Normal inferior vena cava size and inspiratory collapse.  Normal-sized left atrium    Echocardiogram, 2015:  Normal left ventricular systolic function.  Left ventricular ejection fraction is visually estimated to be 70%.  Mild mitral regurgitation.  Right ventricular systolic pressure is estimated to be 35 mmHg     PET myocardial perfusion imaging, 2018:  ELECTROCARDIOGRAPHIC FINDINGS:  Show a normal sinus rhythm with no ischemic ST segment changes at rest or peak stress.  SCINTOGRAPHIC FINDINGS:  Show normal homogeneous tracer uptake at rest and peak stress.  GATED WALL MOTION FINDINGS:  Show normal LV systolic function with a resting ejection fraction of 62%, which increased to 72% at peak stress.  CONCLUSIONS AND IMPRESSION:  Normal stress test    Treadmill stress test 10/23/2015  Baseline ECG: normal sinus rhythm, rate 60. No ischemic  changes.  Exercise ECG: Good exercise capacity, achieving an adequate  cardiac workload; 9 minutes 15 seconds of a standard Surendra  protocol, 10.6 METS, 85% of Maximum Age Predicted Heart Rate  (MAPHR).   No arrhythmias.  Normal blood pressure response to exercise.   There is no clinical or electrocardiographic evidence of  ischemia.  Rodriges score places the patient at low risk.    Most recent labs:     Lab Results   Component Value Date/Time    HEMOGLOBIN 16.3 09/25/2020 06:09 AM    HEMATOCRIT 46.6 09/25/2020 06:09 AM    MCV 89.6 09/25/2020 06:09 AM    INR 0.95 10/28/2014 08:27 PM      Lab Results   Component Value Date/Time    SODIUM 142 09/25/2020 06:09 AM    POTASSIUM 4.0 09/25/2020 06:09 AM    CHLORIDE 104 09/25/2020 06:09 AM    CO2 25 09/25/2020 06:09 AM    GLUCOSE 94 09/25/2020 06:09 AM    BUN 16 09/25/2020 06:09 AM    CREATININE 0.90 09/25/2020 06:09 AM      Lab Results   Component Value Date/Time    ASTSGOT 22 09/25/2020 06:09 AM    ALTSGPT 29 09/25/2020 06:09 AM    ALBUMIN 4.5 09/25/2020 06:09 AM      Lab Results   Component Value Date/Time    CHOLSTRLTOT 169 09/25/2020 06:09 AM     (H) 09/25/2020 06:09 AM    HDL 35 (A) 09/25/2020 06:09 AM    TRIGLYCERIDE 117 09/25/2020 06:09 AM       10/7/2019 sodium 145 5, potassium 4, creatinine 0.85, hemoglobin A1c 5.7, TSH 1.15  9/19/2019 HDL low at 37, LDL 78, triglycerides 116, total cholesterol 138  7/18/2019 LFTs normal  10/13/2017 hemoglobin 16.1, platelets 179    Past Medical History:   Diagnosis Date   • High cholesterol    • Hyperlipidemia    • Hypertension    • Obesity    • Paroxysmal atrial fibrillation (HCC)    • Unspecified cataract      Past Surgical History:   Procedure Laterality Date   • CATARACT PHACO WITH IOL  4/20/2015    Performed by Jakob Garcia M.D. at SURGERY SURGICAL [facility] ORS   • CATARACT PHACO WITH IOL  4/1/2015    Performed by Jakob Garcia M.D. at SURGERY SAME DAY [facility] ORS   • DUPUYTREN CONTRACTURE RELEASE  7/17/2012     Performed by RAFAEL CALVILLO at SURGERY [facility] ORS   • HAND SURGERY       Family History   Problem Relation Age of Onset   • Other Mother 68        kidney CA   • Heart Disease Mother         CHF at 86     Social History     Socioeconomic History   • Marital status:      Spouse name: Not on file   • Number of children: Not on file   • Years of education: Not on file   • Highest education level: Not on file   Occupational History   • Not on file   Tobacco Use   • Smoking status: Former Smoker     Years: 5.00     Types: Cigarettes     Quit date: 1984     Years since quittin.9   • Smokeless tobacco: Former User     Types: Chew     Quit date: 1984   Vaping Use   • Vaping Use: Never used   Substance and Sexual Activity   • Alcohol use: Yes     Comment: Occasionally   • Drug use: No   • Sexual activity: Not on file   Other Topics Concern   • Not on file   Social History Narrative   • Not on file     Social Determinants of Health     Financial Resource Strain:    • Difficulty of Paying Living Expenses:    Food Insecurity:    • Worried About Running Out of Food in the Last Year:    • Ran Out of Food in the Last Year:    Transportation Needs:    • Lack of Transportation (Medical):    • Lack of Transportation (Non-Medical):    Physical Activity:    • Days of Exercise per Week:    • Minutes of Exercise per Session:    Stress:    • Feeling of Stress :    Social Connections:    • Frequency of Communication with Friends and Family:    • Frequency of Social Gatherings with Friends and Family:    • Attends [employer] Services:    • Active Member of Clubs or Organizations:    • Attends Club or Organization Meetings:    • Marital Status:    Intimate Partner Violence:    • Fear of Current or Ex-Partner:    • Emotionally Abused:    • Physically Abused:    • Sexually Abused:      Allergies   Allergen Reactions   • Lipitor [Atorvastatin]      Myalgias       Current Outpatient Medications   Medication Sig Dispense  "Refill   • Metoprolol Tartrate 37.5 MG Tab Take 25 mg by mouth 2 times a day. 180 tablet 7   • apixaban (ELIQUIS) 5mg Tab Take 1 tablet by mouth 2 times a day. TAKE ONE TABLET BY MOUTH TWICE A  tablet 7   • pravastatin (PRAVACHOL) 40 MG tablet Take 1 tablet by mouth every day. 90 tablet 7   • propafenone (RYTHMOL) 150 MG Tab Take 1 tablet by mouth every 8 hours. 270 tablet 7   • metFORMIN ER (GLUCOPHAGE XR) 500 MG TABLET SR 24 HR Take 500 mg by mouth every day.     • levothyroxine (SYNTHROID) 150 MCG Tab      • vitamin e (VITAMIN E) 400 UNIT Cap Take 400 Units by mouth every day.     • Coenzyme Q10 (CO Q 10) 100 MG CAPS Take 1 Tab by mouth every day.     • B Complex-C (SUPER B COMPLEX PO) Take 1 Tab by mouth every day.     • vitamin D (CHOLECALCIFEROL) 1000 UNIT TABS Take 1,000 Units by mouth every day.       No current facility-administered medications for this visit.       ROS    All others systems reviewed and negative.     Objective:     /74 (BP Location: Left arm, Patient Position: Sitting, BP Cuff Size: Adult)   Pulse 60   Resp 14   Ht 1.778 m (5' 10\")   Wt (!) 128 kg (283 lb)   SpO2 95%  Body mass index is 40.61 kg/m².    General: No acute distress. Well nourished.  HEENT: EOM grossly intact, no scleral icterus, no pharyngeal erythema.   Neck:  No JVD, no bruits, trachea midline  CVS: RRR. Normal S1, S2. No M/R/G. No LE edema.  2+ radial pulses, 2+ PT pulses  Resp: CTAB. No wheezing or crackles/rhonchi. Normal respiratory effort.  Abdomen: Soft, NT, no fatimah hepatomegaly.  MSK/Ext: No clubbing or cyanosis.  Skin: Warm and dry, no rashes.  Neurological: CN III-XII grossly intact. No focal deficits.   Psych: A&O x 3, appropriate affect, good judgement      Assessment:     1. PAF (paroxysmal atrial fibrillation) (HCC)  apixaban (ELIQUIS) 5mg Tab    REFERRAL TO CARDIOLOGY    REFERRAL TO PULMONARY AND SLEEP MEDICINE    EKG    Basic Metabolic Panel    MAGNESIUM   2. Essential hypertension  " Metoprolol Tartrate 37.5 MG Tab   3. Chronic anticoagulation     4. Dysmetabolic syndrome X     5. Mixed hyperlipidemia  Lipid Profile   6. Other chest pain     7. PVC (premature ventricular contraction)     8. Former tobacco use     9. Long term current use of antiarrhythmic drug  EKG   10. Lower extremity edema     11. FORREST (obstructive sleep apnea)  REFERRAL TO PULMONARY AND SLEEP MEDICINE       Medical Decision Making:  Today's Assessment / Status / Plan:     -PAF worse, probably b/c he cannot exercise now, avoids ETOH already  -Time to escalate 1C med and refer to EP for possible ablation. He is willing to meet with EP to discuss if ablation is right for him  -Consider stopping fenofibrate and stay on statin alone, he has never had pancreatitis or TG >500. Could try without fenofibrate and reassess.  -LE edema now, salt and fluid restriction, I do not think it is CHF  -Chads 2 vascular score is 2, on Apixaban, no TIA/CVA  -Needs FORREST eval  -TSH has been ok, last was 9-2020  -AAA screening at 65, we will wait per his preference  -BP controlled  -ECG today  -Propafenone is a high risk medication requiring annual ecg, annual electrolytes and sometimes treadmill stress test.  -RTC 6 months    Written instructions given today:    -It is time to try to prevent your atrial fibrillation instead of reacting when it happens.    -Start propafenone 150 mg 3 times a day, every 8 hours while you are awake.  Do not let the timing of this ruin your day.      -If 150 mg is not working after few days, you can go to 225 mg by taking 1.5 pills 3 times a day.  You can work your way up to 300 mg 3 times a day.  This would be the maximum dose.    -I am sending a new prescription of a 90-day supply with the 150 mg, this will give you plenty of pills to adjust the dose as needed.    -If you are on the 150 or 225 mg doses, you can still take an extra tablet of 150 mg as needed for breakthrough.  If you are on the 300 mg 3 times a day, I  do not recommend an additional dose of that medication but you could take an extra dose of your metoprolol.    -Reduce your metoprolol dose from 37.5 twice daily to 25 mg twice daily.    -I would like you to see one of my partners, Electrophysiologist (EP), when you are ready to discuss the pros and cons of ablation and decide if that is the right way for you to go.    -Dr. Nicolas Parks or Dr. Farhad Macias    -It is the standard of care when you have atrial fibrillation that is becoming uncontrolled to have a sleep study performed because they go hand-in-hand.  I will refer you to the sleep center.  [facility] sometimes takes a while to get in.  There is another sleep center called [facility] over in [place] that you could call if you would like to move a little faster.    -Stop fenofibrate for now.  Reassess your cholesterol in 3 months.  Pravastatin will treat the triglycerides also.  Pravastatin and fenofibrate can interact with each other causing muscle problems.  We generally use fenofibrate if her triglycerides are greater than 500 despite pravastatin or you have a history of pancreatitis (which is not you).    -My partner may want an updated echocardiogram but I will defer to him after you meet with him.    -Lower extremity swelling for someone like he was probably not a sign of congestive heart failure but just fluid retention related to the veins.  The first thing to do is to cut down salt, try to get your salt intake less than 4000 mg a day, closer to 2500 mg which can be a challenge but that is guideline based.  Also, cut back on free fluids.  This would be preferred over taking a diuretic such as furosemide.  Also consider compression socks.    -Fasting blood work in 3 months to follow-up on how your triglycerides are doing off of the fenofibrate, I did order this today    -Nonfasting blood work today or tomorrow to check your potassium, kidney function and magnesium.    Return in about 6 months (around  12/3/2021).    It is my pleasure to participate in the care of Mr. Villarreal.  Please do not hesitate to contact me with questions or concerns.    Jane Mcgill MD, [facility]  Cardiologist [facility]    Please note that this dictation was created using voice recognition software. I have made every reasonable attempt to correct obvious errors, but it is possible there are errors of grammar and possibly content that I did not discover before finalizing the note.        RAY DialO.  [address] [place] [address]  Via Fax: [phone]

## 2021-06-03 NOTE — PATIENT INSTRUCTIONS
-It is time to try to prevent your atrial fibrillation instead of reacting when it happens.    -Start propafenone 150 mg 3 times a day, every 8 hours while you are awake.  Do not let the timing of this ruin your day.      -If 150 mg is not working after few days, you can go to 225 mg by taking 1.5 pills 3 times a day.  You can work your way up to 300 mg 3 times a day.  This would be the maximum dose.    -I am sending a new prescription of a 90-day supply with the 150 mg, this will give you plenty of pills to adjust the dose as needed.    -If you are on the 150 or 225 mg doses, you can still take an extra tablet of 150 mg as needed for breakthrough.  If you are on the 300 mg 3 times a day, I do not recommend an additional dose of that medication but you could take an extra dose of your metoprolol.    -Reduce your metoprolol dose from 37.5 twice daily to 25 mg twice daily.    -I would like you to see one of my partners, Electrophysiologist (EP), when you are ready to discuss the pros and cons of ablation and decide if that is the right way for you to go.    -Dr. Nicolas Parks or Dr. Farhad Macias    -It is the standard of care when you have atrial fibrillation that is becoming uncontrolled to have a sleep study performed because they go hand-in-hand.  I will refer you to the sleep center.  [facility] sometimes takes a while to get in.  There is another sleep center called [facility] over in [place] that you could call if you would like to move a little faster.    -Stop fenofibrate for now.  Reassess your cholesterol in 3 months.  Pravastatin will treat the triglycerides also.  Pravastatin and fenofibrate can interact with each other causing muscle problems.  We generally use fenofibrate if her triglycerides are greater than 500 despite pravastatin or you have a history of pancreatitis (which is not you).    -My partner may want an updated echocardiogram but I will defer to him after you meet with him.    -Lower extremity  swelling for someone like he was probably not a sign of congestive heart failure but just fluid retention related to the veins.  The first thing to do is to cut down salt, try to get your salt intake less than 4000 mg a day, closer to 2500 mg which can be a challenge but that is guideline based.  Also, cut back on free fluids.  This would be preferred over taking a diuretic such as furosemide.  Also consider compression socks.    -Fasting blood work in 3 months to follow-up on how your triglycerides are doing off of the fenofibrate, I did order this today    -Nonfasting blood work today or tomorrow to check your potassium, kidney function and magnesium.

## 2021-06-03 NOTE — PROGRESS NOTES
Subjective:   Chief Complaint:   Chief Complaint   Patient presents with   • Hyperlipidemia   • Atrial Fibrillation     F/V Dx: PAF (paroxysmal atrial fibrillation) ([facility])       Michael Villarreal is a 66 y.o. male who returns today for symp paroxysmal atrial fibrillation, chronic anticoagulation, hypertension, HLP, statin intolerance, today more PAF and now LE edema.    Getting afib, up to 3 hours (reviewed Kardia mobile strips today), feels heart racing, lightheaded, SOB, . Prior to meds, afib was over 160.  He had been drinking heavily the night before. He does not drink.     He had been doing propafenone PRN and meto BID but now having breakthrough.    We tried flecainide twice daily but this was not effective.  Used to walk 3-6 miles at least 5 days per week.  Knee injury, plantar fasciitis.    TSH ok .    Chads 2 vascular score is 2, not clear, borderline HTN, has IFG.  Remains on apixaban, no bleeding.    Echocardiogram  with normal size left atrium, normal EF, no significant abnormalities.    Had a PET nuclear stress test in 2018 which showed normal perfusion.  Remote Holter  with brief runs of paroxysmal atrial tachycardia but no A. fib, some PVCs and PACs.    Probably HTN, on metoprolol, controlled.    Has HLP, fenofibrate and pravastatin 40 mg.  Had myalgias on statin, LDL was 60s, now 111, HDL low.  Had been on  vascepa with  Coupon but not indicated.  Has not had pancreatitis or triglycerides greater than 500.    No family history of premature coronary artery disease.  Former smoker, remote.  No history of autoimmune disease such as lupus or rheumatoid arthritis.  No chronic kidney disease.    Again, limited by knee/foot pain/sciatica.  Has mild BARROSO and now some LE edema.  He is not limited by chest pain, pressure or tightness with activity.   No orthopnea.   No presyncope/syncope.   No symptoms of leg claudication.   No stroke/TIA like symptoms.    His father still alive.  Mother   of CHF at 87.    .    DATA REVIEWED by me:  ECG 11-21-19  Sinus, 64, NS IVCD, baseline wandering.    ECG 10/24/2014  Sinus with frequent PACs, delayed R wave progression, supraventricular bigeminy, left axis deviation, nonspecific diffuse repolarization abnormality    Holter monitor 2015  No A. Fib, rare PVCs, rare PACs, short runs of paroxysmal atrial tachycardia    Echo 11-11-19  Normal left ventricular chamber size.  Left ventricular ejection fraction is visually estimated to be 70%.  Normal diastolic function.  No significant valve abnormalities.   Normal inferior vena cava size and inspiratory collapse.    Echo 11/11/2018  Normal left ventricular chamber size.  Left ventricular ejection fraction is visually estimated to be 70%.  Normal diastolic function.  No significant valve abnormalities.   Normal inferior vena cava size and inspiratory collapse.  Normal-sized left atrium    Echocardiogram, 11/2/2015:  Normal left ventricular systolic function.  Left ventricular ejection fraction is visually estimated to be 70%.  Mild mitral regurgitation.  Right ventricular systolic pressure is estimated to be 35 mmHg     PET myocardial perfusion imaging, 8/6/2018:  ELECTROCARDIOGRAPHIC FINDINGS:  Show a normal sinus rhythm with no ischemic ST segment changes at rest or peak stress.  SCINTOGRAPHIC FINDINGS:  Show normal homogeneous tracer uptake at rest and peak stress.  GATED WALL MOTION FINDINGS:  Show normal LV systolic function with a resting ejection fraction of 62%, which increased to 72% at peak stress.  CONCLUSIONS AND IMPRESSION:  Normal stress test    Treadmill stress test 10/23/2015  Baseline ECG: normal sinus rhythm, rate 60. No ischemic changes.  Exercise ECG: Good exercise capacity, achieving an adequate  cardiac workload; 9 minutes 15 seconds of a standard Surendra  protocol, 10.6 METS, 85% of Maximum Age Predicted Heart Rate  (MAPHR).   No arrhythmias.  Normal blood pressure response to exercise.   There  is no clinical or electrocardiographic evidence of  ischemia.  Rodriges score places the patient at low risk.    Most recent labs:     Lab Results   Component Value Date/Time    HEMOGLOBIN 16.3 09/25/2020 06:09 AM    HEMATOCRIT 46.6 09/25/2020 06:09 AM    MCV 89.6 09/25/2020 06:09 AM    INR 0.95 10/28/2014 08:27 PM      Lab Results   Component Value Date/Time    SODIUM 142 09/25/2020 06:09 AM    POTASSIUM 4.0 09/25/2020 06:09 AM    CHLORIDE 104 09/25/2020 06:09 AM    CO2 25 09/25/2020 06:09 AM    GLUCOSE 94 09/25/2020 06:09 AM    BUN 16 09/25/2020 06:09 AM    CREATININE 0.90 09/25/2020 06:09 AM      Lab Results   Component Value Date/Time    ASTSGOT 22 09/25/2020 06:09 AM    ALTSGPT 29 09/25/2020 06:09 AM    ALBUMIN 4.5 09/25/2020 06:09 AM      Lab Results   Component Value Date/Time    CHOLSTRLTOT 169 09/25/2020 06:09 AM     (H) 09/25/2020 06:09 AM    HDL 35 (A) 09/25/2020 06:09 AM    TRIGLYCERIDE 117 09/25/2020 06:09 AM       10/7/2019 sodium 145 5, potassium 4, creatinine 0.85, hemoglobin A1c 5.7, TSH 1.15  9/19/2019 HDL low at 37, LDL 78, triglycerides 116, total cholesterol 138  7/18/2019 LFTs normal  10/13/2017 hemoglobin 16.1, platelets 179    Past Medical History:   Diagnosis Date   • High cholesterol    • Hyperlipidemia    • Hypertension    • Obesity    • Paroxysmal atrial fibrillation (HCC)    • Unspecified cataract      Past Surgical History:   Procedure Laterality Date   • CATARACT PHACO WITH IOL  4/20/2015    Performed by Jakob Garcia M.D. at [facility]   • CATARACT PHACO WITH IOL  4/1/2015    Performed by Jakob Garcia M.D. at SUR[facility]ERY [facility]   • DUPUYTREN CONTRACTURE RELEASE  7/17/2012    Performed by RAFAEL CALVILLO at [facility]   • HAND SURGERY       Family History   Problem Relation Age of Onset   • Other Mother 68        kidney CA   • Heart Disease Mother         CHF at 86     Social History     Socioeconomic History   • Marital status:       Spouse name: Not on file   • Number of children: Not on file   • Years of education: Not on file   • Highest education level: Not on file   Occupational History   • Not on file   Tobacco Use   • Smoking status: Former Smoker     Years: 5.00     Types: Cigarettes     Quit date: 1984     Years since quittin.9   • Smokeless tobacco: Former User     Types: Chew     Quit date: 1984   Vaping Use   • Vaping Use: Never used   Substance and Sexual Activity   • Alcohol use: Yes     Comment: Occasionally   • Drug use: No   • Sexual activity: Not on file   Other Topics Concern   • Not on file   Social History Narrative   • Not on file     Social Determinants of Health     Financial Resource Strain:    • Difficulty of Paying Living Expenses:    Food Insecurity:    • Worried About Running Out of Food in the Last Year:    • Ran Out of Food in the Last Year:    Transportation Needs:    • Lack of Transportation (Medical):    • Lack of Transportation (Non-Medical):    Physical Activity:    • Days of Exercise per Week:    • Minutes of Exercise per Session:    Stress:    • Feeling of Stress :    Social Connections:    • Frequency of Communication with Friends and Family:    • Frequency of Social Gatherings with Friends and Family:    • Attends [employer] Services:    • Active Member of Clubs or Organizations:    • Attends Club or Organization Meetings:    • Marital Status:    Intimate Partner Violence:    • Fear of Current or Ex-Partner:    • Emotionally Abused:    • Physically Abused:    • Sexually Abused:      Allergies   Allergen Reactions   • Lipitor [Atorvastatin]      Myalgias       Current Outpatient Medications   Medication Sig Dispense Refill   • Metoprolol Tartrate 37.5 MG Tab Take 25 mg by mouth 2 times a day. 180 tablet 7   • apixaban (ELIQUIS) 5mg Tab Take 1 tablet by mouth 2 times a day. TAKE ONE TABLET BY MOUTH TWICE A  tablet 7   • pravastatin (PRAVACHOL) 40 MG tablet Take 1 tablet by mouth every  "day. 90 tablet 7   • propafenone (RYTHMOL) 150 MG Tab Take 1 tablet by mouth every 8 hours. 270 tablet 7   • metFORMIN ER (GLUCOPHAGE XR) 500 MG TABLET SR 24 HR Take 500 mg by mouth every day.     • levothyroxine (SYNTHROID) 150 MCG Tab      • vitamin e (VITAMIN E) 400 UNIT Cap Take 400 Units by mouth every day.     • Coenzyme Q10 (CO Q 10) 100 MG CAPS Take 1 Tab by mouth every day.     • B Complex-C (SUPER B COMPLEX PO) Take 1 Tab by mouth every day.     • vitamin D (CHOLECALCIFEROL) 1000 UNIT TABS Take 1,000 Units by mouth every day.       No current facility-administered medications for this visit.       ROS    All others systems reviewed and negative.     Objective:     /74 (BP Location: Left arm, Patient Position: Sitting, BP Cuff Size: Adult)   Pulse 60   Resp 14   Ht 1.778 m (5' 10\")   Wt (!) 128 kg (283 lb)   SpO2 95%  Body mass index is 40.61 kg/m².    General: No acute distress. Well nourished.  HEENT: EOM grossly intact, no scleral icterus, no pharyngeal erythema.   Neck:  No JVD, no bruits, trachea midline  CVS: RRR. Normal S1, S2. No M/R/G. No LE edema.  2+ radial pulses, 2+ PT pulses  Resp: CTAB. No wheezing or crackles/rhonchi. Normal respiratory effort.  Abdomen: Soft, NT, no fatimah hepatomegaly.  MSK/Ext: No clubbing or cyanosis.  Skin: Warm and dry, no rashes.  Neurological: CN III-XII grossly intact. No focal deficits.   Psych: A&O x 3, appropriate affect, good judgement      Assessment:     1. PAF (paroxysmal atrial fibrillation) (HCC)  apixaban (ELIQUIS) 5mg Tab    REFERRAL TO CARDIOLOGY    REFERRAL TO PULMONARY AND SLEEP MEDICINE    EKG    Basic Metabolic Panel    MAGNESIUM   2. Essential hypertension  Metoprolol Tartrate 37.5 MG Tab   3. Chronic anticoagulation     4. Dysmetabolic syndrome X     5. Mixed hyperlipidemia  Lipid Profile   6. Other chest pain     7. PVC (premature ventricular contraction)     8. Former tobacco use     9. Long term current use of antiarrhythmic drug  EKG "   10. Lower extremity edema     11. FORREST (obstructive sleep apnea)  REFERRAL TO PULMONARY AND SLEEP MEDICINE       Medical Decision Making:  Today's Assessment / Status / Plan:     -PAF worse, probably b/c he cannot exercise now, avoids ETOH already  -Time to escalate 1C med and refer to EP for possible ablation. He is willing to meet with EP to discuss if ablation is right for him  -Consider stopping fenofibrate and stay on statin alone, he has never had pancreatitis or TG >500. Could try without fenofibrate and reassess.  -LE edema now, salt and fluid restriction, I do not think it is CHF  -Chads 2 vascular score is 2, on Apixaban, no TIA/CVA  -Needs FORREST eval  -TSH has been ok, last was 9-2020  -AAA screening at 65, we will wait per his preference  -BP controlled  -ECG today  -Propafenone is a high risk medication requiring annual ecg, annual electrolytes and sometimes treadmill stress test.  -RTC 6 months    Written instructions given today:    -It is time to try to prevent your atrial fibrillation instead of reacting when it happens.    -Start propafenone 150 mg 3 times a day, every 8 hours while you are awake.  Do not let the timing of this ruin your day.      -If 150 mg is not working after few days, you can go to 225 mg by taking 1.5 pills 3 times a day.  You can work your way up to 300 mg 3 times a day.  This would be the maximum dose.    -I am sending a new prescription of a 90-day supply with the 150 mg, this will give you plenty of pills to adjust the dose as needed.    -If you are on the 150 or 225 mg doses, you can still take an extra tablet of 150 mg as needed for breakthrough.  If you are on the 300 mg 3 times a day, I do not recommend an additional dose of that medication but you could take an extra dose of your metoprolol.    -Reduce your metoprolol dose from 37.5 twice daily to 25 mg twice daily.    -I would like you to see one of my partners, Electrophysiologist (EP), when you are ready to discuss  the pros and cons of ablation and decide if that is the right way for you to go.    -Dr. Nicolas Parks or Dr. Farhad Macias    -It is the standard of care when you have atrial fibrillation that is becoming uncontrolled to have a sleep study performed because they go hand-in-hand.  I will refer you to the sleep center.  [facility] sometimes takes a while to get in.  There is another sleep center called [facility] over in [place] that you could call if you would like to move a little faster.    -Stop fenofibrate for now.  Reassess your cholesterol in 3 months.  Pravastatin will treat the triglycerides also.  Pravastatin and fenofibrate can interact with each other causing muscle problems.  We generally use fenofibrate if her triglycerides are greater than 500 despite pravastatin or you have a history of pancreatitis (which is not you).    -My partner may want an updated echocardiogram but I will defer to him after you meet with him.    -Lower extremity swelling for someone like he was probably not a sign of congestive heart failure but just fluid retention related to the veins.  The first thing to do is to cut down salt, try to get your salt intake less than 4000 mg a day, closer to 2500 mg which can be a challenge but that is guideline based.  Also, cut back on free fluids.  This would be preferred over taking a diuretic such as furosemide.  Also consider compression socks.    -Fasting blood work in 3 months to follow-up on how your triglycerides are doing off of the fenofibrate, I did order this today    -Nonfasting blood work today or tomorrow to check your potassium, kidney function and magnesium.    Return in about 6 months (around 12/3/2021).    It is my pleasure to participate in the care of Mr. Villarreal.  Please do not hesitate to contact me with questions or concerns.    Jane Mcgill MD, [facility]  Cardiologist [facility]    Please note that this dictation was created using voice  recognition software. I have made every reasonable attempt to correct obvious errors, but it is possible there are errors of grammar and possibly content that I did not discover before finalizing the note.

## 2021-06-09 ENCOUNTER — TELEPHONE (OUTPATIENT)
Dept: CARDIOLOGY | Facility: MEDICAL CENTER | Age: 66
End: 2021-06-09

## 2021-06-09 NOTE — TELEPHONE ENCOUNTER
Per LS's note pt is to be taking metoprolol 25 mg BID. Called pharmacy and informed them of this.

## 2021-06-09 NOTE — TELEPHONE ENCOUNTER
[facility] called and needs clarification on the Metoprolol. They need to clarify the strength patient needs to be on. Please call [phone].    Thank you,    Kirsten FABIAN

## 2021-06-29 ENCOUNTER — SLEEP CENTER VISIT (OUTPATIENT)
Dept: SLEEP MEDICINE | Facility: MEDICAL CENTER | Age: 66
End: 2021-06-29
Payer: COMMERCIAL

## 2021-06-29 VITALS
RESPIRATION RATE: 16 BRPM | HEART RATE: 66 BPM | WEIGHT: 283 LBS | OXYGEN SATURATION: 94 % | HEIGHT: 70 IN | BODY MASS INDEX: 40.52 KG/M2 | SYSTOLIC BLOOD PRESSURE: 130 MMHG | DIASTOLIC BLOOD PRESSURE: 86 MMHG

## 2021-06-29 DIAGNOSIS — I48.0 PAROXYSMAL ATRIAL FIBRILLATION (HCC): ICD-10-CM

## 2021-06-29 DIAGNOSIS — R06.83 SNORING: ICD-10-CM

## 2021-06-29 DIAGNOSIS — G47.30 BREATHING-RELATED SLEEP DISORDER: ICD-10-CM

## 2021-06-29 DIAGNOSIS — E66.01 CLASS 3 SEVERE OBESITY WITH SERIOUS COMORBIDITY AND BODY MASS INDEX (BMI) OF 40.0 TO 44.9 IN ADULT, UNSPECIFIED OBESITY TYPE (HCC): ICD-10-CM

## 2021-06-29 PROCEDURE — 99203 OFFICE O/P NEW LOW 30 MIN: CPT | Performed by: PREVENTIVE MEDICINE

## 2021-06-29 RX ORDER — ZOLPIDEM TARTRATE 5 MG/1
5 TABLET ORAL NIGHTLY PRN
Qty: 2 TABLET | Refills: 0 | Status: SHIPPED | OUTPATIENT
Start: 2021-06-29 | End: 2021-12-10

## 2021-06-29 ASSESSMENT — FIBROSIS 4 INDEX: FIB4 SCORE: 1.7

## 2021-06-29 NOTE — PROGRESS NOTES
"  CC: \"  I guess I need a sleep study.\"    HPI:  Michael Villarreal is a 66 y.o. male kindly referred by Robson Ramos D.O..  This patient has paroxysmal atrial fibrillation that is somewhat difficult to control.  He also has hypertension, chronic anticoagulation and now lower extremity edema.  He is referred here by cardiology because of the paroxysmal atrial fibrillation.  He completed the sleep survey online..  This patient goes to bed between 8 and 830 and falls asleep within 5 minutes.  He will wake up 1 time each night to use the bathroom. He does wake up too early in the morning at times and is unable to return to sleep.  He will sometimes take naps and he does fall asleep unintentionally at times.  He does have a difficult time breathing at night especially when he is in A. fib.  He has been told that he snores at night.        Symptom Summary:  Snoring: +  Very loud snoring: +  Witnessed apneas: -  Resuscitative snorts: +  Nocturnal shortness of breath: +  Non-restorative sleep: +  Nocturnal awakenings: +  Nocturia: +  Fatigue: +  Tiredness: +  Falls asleep accidentally: +   Napping or returning to bed after arising: +  Restless legs: -  Limb movements during sleep: -  Nocturnal headaches: -  Morning Headaches: -        Patient Active Problem List    Diagnosis Date Noted   • Chronic anticoagulation 12/10/2018   • Non morbid obesity due to excess calories 06/06/2017   • PAF (paroxysmal atrial fibrillation) (HCC) 05/19/2016   • Dysmetabolic syndrome X 05/14/2015   • Mechanical complication due to ocular lens prosthesis 04/20/2015   • Senile nuclear sclerosis 04/01/2015   • Hyperlipidemia 08/16/2013       Past Medical History:   Diagnosis Date   • Daytime sleepiness    • High cholesterol    • Hyperlipidemia    • Hypertension    • Obesity    • Paroxysmal atrial fibrillation (HCC)    • Snoring    • Unspecified cataract         Past Surgical History:   Procedure Laterality Date   • CATARACT PHACO WITH IOL  " 2015    Performed by Jakob Garcia M.D. at [facility]   • CATARACT PHACO WITH IOL  2015    Performed by Jakob Garcia M.D. at [facility]   • DUPUYTREN CONTRACTURE RELEASE  2012    Performed by RAFAEL CALVILLO at [facility]   • HAND SURGERY     • TONSILLECTOMY         Family History   Problem Relation Age of Onset   • Other Mother 68        kidney CA   • Heart Disease Mother         CHF at 86       Social History     Socioeconomic History   • Marital status:      Spouse name: Not on file   • Number of children: Not on file   • Years of education: Not on file   • Highest education level: Not on file   Occupational History   • Not on file   Tobacco Use   • Smoking status: Former Smoker     Packs/day: 1.00     Years: 5.00     Pack years: 5.00     Types: Cigarettes     Quit date: 1984     Years since quittin.9   • Smokeless tobacco: Former User     Types: Chew     Quit date: 1984   Vaping Use   • Vaping Use: Never used   Substance and Sexual Activity   • Alcohol use: Yes     Comment: Occasionally   • Drug use: No   • Sexual activity: Not on file   Other Topics Concern   • Not on file   Social History Narrative   • Not on file     Social Determinants of Health     Financial Resource Strain:    • Difficulty of Paying Living Expenses:    Food Insecurity:    • Worried About Running Out of Food in the Last Year:    • Ran Out of Food in the Last Year:    Transportation Needs:    • Lack of Transportation (Medical):    • Lack of Transportation (Non-Medical):    Physical Activity:    • Days of Exercise per Week:    • Minutes of Exercise per Session:    Stress:    • Feeling of Stress :    Social Connections:    • Frequency of Communication with Friends and Family:    • Frequency of Social Gatherings with Friends and Family:    • Attends [employer] Services:    • Active Member of Clubs or Organizations:    • Attends Club or Organization Meetings:   "  • Marital Status:    Intimate Partner Violence:    • Fear of Current or Ex-Partner:    • Emotionally Abused:    • Physically Abused:    • Sexually Abused:            ALLERGIES: Lipitor [atorvastatin]    ROS    Constitutional: Denies weight loss, endorses fatigue.  Ears/Nose/Mouth/Throat: Denies rhinitis/nasal congestion,  Cardiovascular: Endorses palpitations, swelling in legs/feet, difficulty breathing when lying down  when in AFib   Respiratory: Endorses shortness of breath while awake, and asleep he is in A. fib,  Sleep: per HPI  Gastrointestinal: Denies  difficulty swallowing  Genitourinary: REPORTS nocturia  Musculoskeletal: Endorses painful joints,  foot pain.   Neurological: Denies frequent headaches,weakness, dizziness.    PHYSICAL EXAM    Neck circumference:  /86 (BP Location: Left arm, Patient Position: Sitting, BP Cuff Size: Adult)   Pulse 66   Resp 16   Ht 1.778 m (5' 10\")   Wt (!) 128 kg (283 lb)   SpO2 94%   BMI 40.61 kg/m²   Appearance: ,  looks stated age, no acute distress  Eyes:   EOMI  ENMT: MASKED  Neck:  trachea midline  Respiratory effort:  No intercostal retractions or use of accessory muscles  Cardiac: LE  edema  Musculoskeletal:  Grossly normal; gait and station normal  Neurologic:  oriented to person, time, place, and purpose; judgement intact  Psychiatric:  No depression, anxiety, agitation        Medical Decision Making:  The medical record was reviewed in its as pertains to this referral. This includes records from primary care, consultants notes,  referral request, hospital records, labs, imaging.    Any available diagnostic and titration nocturnal polysomnograms, home sleep apnea tests, continuous nocturnal oximetry results, multiple sleep latency tests, and compliance reports were reviewed with the patient.    Assessment:    1. Paroxysmal atrial fibrillation (HCC)  - Polysomnography, 4 or More; Future    2. Breathing-related sleep disorder  - Polysomnography, 4 or More; " Future  - zolpidem (AMBIEN) 5 MG Tab; Take 1 tablet by mouth at bedtime as needed for Sleep for up to 2 doses. Use only the night of the sleep study.  Dispense: 2 tablet; Refill: 0    3. Snoring  - Polysomnography, 4 or More; Future    4. Class 3 severe obesity with serious comorbidity and body mass index (BMI) of 40.0 to 44.9 in adult, unspecified obesity type (HCC)  - Polysomnography, 4 or More; Future    Other orders  - MAGNESIUM PO; Take  by mouth.      PLAN:   The patient has signs and symptoms consistent with obstructive sleep apnea hypopnea syndrome. Will schedule a nocturnal polysomnogram.The patient will use Ambien. Split early.      The risks of untreated sleep apnea were discussed with the patient at length. Patients with FORREST are at increased risk of cardiovascular disease including coronary artery disease, systemic arterial hypertension, pulmonary arterial hypertension, cardiac arrhythmias, and stroke. FORREST patients have an increased risk of motor vehicle accidents, type 2 diabetes, chronic kidney disease, and non-alcoholic liver disease. The patient was advised to avoid driving a motor vehicle when drowsy.        Have advised the patient to follow up with the appropriate healthcare practitioners for all other medical problems and issues.              Return for PSG results MUST BE 5 week days after study, Discuss results of sleep study.                      Answers for HPI/ROS submitted by the patient on 6/29/2021  Year of your last physical exam: 1919  Occupation : retired  Height: 5/10  Current weight: 260  6 months ago: 240  At age 20: 175  Have you ever had problems with anesthesia?: No  Have you experienced post-operative delirium?: No  Any complications with surgery?: No  What year did you receive your last Flu shot?: 2020  What year did you receive you last Pneumonia shot?: 2020  Have you had a TB skin test? If so, please list the year and result:: yes  2015  Have you had Allergy skin testing? If  so, please list the year and result:: no  What time do you usually go to bed and wake up on: Weekdays? Weekends?: 8 ;00  3 ;30  Do you have a regular bed partner?: Yes  How many minutes does it usually take to fall asleep at night after turning off the lights?: 5 minutes  What do you ordinarily do just prior to turning out the lights and attempting to go to sleep (e.g., reading, TV, baths, etc.)?: nothing  On average, how many times do you wake up to use the bathroom?: once  Do you often wake up too early in the morning and are unable to return to sleep?: yes  On average, how many hours of sleep do you get per night?: seven  How do you usually awaken?  Alarm, spontaneously, or other?: spontaneously  Is it difficult for you to awaken and get out of bed after sleeping? (Not at all, Sometimes, Very): no  Do you nap or return to bed after arising?: some times  Are you bothered by sleepiness during the day?: sometimes  Do you feel that you get too much sleep at night?: no  Do you feel that you get too little sleep at night?: no  Do you usually feel tired during the day? If so, what do you attribute this to?: no  Do you find yourself falling asleep when you don't mean to? : sometimes  If yes, how long does your sleep episode last?: varies  Do you feel rested or refreshed after the sleep episode?: No  Have you ever suddenly fallen?: no  Have you ever experienced sudden body weakness?: no  Have you ever experienced weakness or paralysis upon going to sleep?: no  Have you ever experienced weakness or paralysis upon awakening from sleep?: no  Have you ever experienced seeing things or hearing voices/noises: That weren't real? On going to sleep? During the night? On awakening from sleep? During the day?: no  Do you have difficulty breathing at night? If yes, briefly describe.: yes when i am in a fib  How many times per week?: varies  Have you been told you snore while asleep? If so, does it disturb a bed partner (or someone in  "the same room), or someone in the next room?: yes varies  Have you ever experienced doing something without being aware of the action? If yes, please describe.: no  Have you ever experienced upon lying in bed before sleep or on awakening from sleep: Restlessness of legs, \"nervous legs\", \"creeping crawling\" sensation of legs, or twitching of legs?: no  Have you ever been told that your arms or legs jerk or twitch while you are asleep? If yes, how many times per night does this occur?: no  Has anyone in your family been known to have any sleep problems? If yes, please list the type of problem (e.g., trouble getting to sleep, too sleepy, bed wetting, etc.), the relationship of this person to you, and the treatment received.: no      "

## 2021-07-20 ENCOUNTER — SLEEP STUDY (OUTPATIENT)
Dept: SLEEP MEDICINE | Facility: MEDICAL CENTER | Age: 66
End: 2021-07-20
Attending: PREVENTIVE MEDICINE
Payer: COMMERCIAL

## 2021-07-20 DIAGNOSIS — R06.83 SNORING: ICD-10-CM

## 2021-07-20 DIAGNOSIS — I48.0 PAROXYSMAL ATRIAL FIBRILLATION (HCC): ICD-10-CM

## 2021-07-20 DIAGNOSIS — E66.01 CLASS 3 SEVERE OBESITY WITH SERIOUS COMORBIDITY AND BODY MASS INDEX (BMI) OF 40.0 TO 44.9 IN ADULT, UNSPECIFIED OBESITY TYPE (HCC): ICD-10-CM

## 2021-07-20 DIAGNOSIS — G47.30 BREATHING-RELATED SLEEP DISORDER: ICD-10-CM

## 2021-07-23 NOTE — PROCEDURES
Clinical Comments:   The patient underwent a split night polysomnogram with a CPAP titration using the standard montage for measurement of paramaters of sleep, respiratory events, movement abnormalities, heart rate and rhythm. A Microphone was used to monitior snoring.  Interpretation:  Study start time was 08:52:58 PM. Total recording time was 3h 12.0m (192 minutes) with a total sleep time of 2h 43.0m (163 minutes) resulting in a sleep efficiency of 84.90%.  Sleep latency from the start fo the study was 18 minutes minutes and REM latency from sleep onset was 00 minutes minutes.  Respiratory:   There were 16 apneas in total consisting of 16 obstructive apneas, 0 mixed apneas, and 0 central apneas. There were 114 hypopneas in total.  The apnea index was 5.89 per hour and the hypopnea index was 41.96 per hour.  The overall AHI was 47.9, with a REM AHI of 0.00, and a supine AHI of 0.00.  Limb Movements:  There were a total of 0 periodic leg movements, of which 0 were PLMS arousals. This resulted in a PLMS index of 0.0 and a PLMS arousal index of 0.0  Oximetry:  The mean SaO2 was 92.0% for the diagnostic portion of the study, with a minimum SaO2 of 80.0%.   Treatment:  Interpretation:  Treatment recording time was 4h 52.0m (292 minutes) with a total sleep time of 3h 52.5m (232 minutes) resulting in a sleep efficiency of 79.6%.   Sleep latency from the start of treatment was 18 minutes minutes and REM latency from sleep onset was 0h 52.5m minutes.   The patient had 62 arousals in total for an arousal index of 16.0.  Respiratory:   There were 10 apneas in total consisting of 8 obstructive apneas, 2 central apneas, and 0 mixed apneas for an apnea index of 2.58.   The patient had 67 hypopneas in total, which resulted in a hypopnea index of 17.29.   The overall AHI was 19.87, with a REM AHI of 21.95, and a supine AHI of 0.00.   Limb Movements:  There were a total of 9 periodic leg movements, of which 0 were PLMS arousals.  This resulted in a PLMS index of 2.3 and a PLMS arousal index of 0.0.  Oximetry:  The mean SaO2 during treatment was 94.0%, with a minimum oxygen saturation of 81.0%.  CPAP was tried from 6 to 13cm H2O.    RECORDING TECHNIQUE:       After the scalp was prepared, gold plated electrodes were applied to the scalp according to the International 10-20 System. EEG (electroencephalogram) was continuously monitored from the O1-M2, O2-M1, C3-M2, C4-M1, F3-M2, and F4-M1.   EOGs (electrooculograms) were monitored by electrodes placed at the left and right outer canthi.  Chin EMG (electromyogram) was monitored by electrodes placed on the mentalis and sub-mentalis muscles.  Nasal and oral airflow were monitored using a triple port thermocouple as well as oronasal pressure transducer.  Respiratory effort was measured by inductive plethysmography technology employing abdominal and thoracic belts.  Blood oxygen saturation and pulse were monitored by pulse oximetry.  Heart rhythm was monitored by surface electrocardiogram.  Leg EMG was studied using surface electrodes placed on left and right anterior tibialis.  A microphone was used to monitor tracheal sounds and snoring.  Body position was monitored and documented by technician observation      SUMMARY:    This was an overnight diagnostic polysomnogram with a subsequent positive airway pressure titration, also known as a split- night study.  The patient chose to use a medium Eson 2 mask with heated humidification.    During the diagnostic phase the total recording time was 192 minutes, the sleep period time was 171 minutes, and the total sleep time was 163 minutes.  The patient's sleep efficiency was 84.90% which is near normal.  The patient experienced 0 REM period(s).    The sleep stage durations revealed 10.5 minutes of wake after sleep onset (WASO), 15 minutes of N1 sleep, 147 minutes of N2 sleep, *0 minutes of N3 sleep, and 0 minutes of REM.    The latency to sleep was 18  minutes which is normal.  The latency to REM was not applicable as the patient did not achieve REM.  Moderate sleep fragmentation occurred.  The arousal index was 21  The Limb Movement with Arousal Index was 0.4, the Total Limb Movement (isolated) Index was 6.6, and the PLM Series Index was 0.0.    The patient experienced 0 central and 16 obstructive apneas, 0 central and 114 obstructive hypopneas, 130 apneas and hypopneas, and 0 RERAS.  The apnea hypopnea index was 47.9, the RDI was 47.9, the mean event duration was 24.8 seconds, and the longest event lasted 51.8 seconds.  The REM index was 0 and the supine index was 0.  The apnea hypopnea index represents severe obstructive sleep apnea hypopnea syndrome.    The francesca saturation during sleep was 80% and the patient spent 24.2% of the diagnostic recording with saturations less than or equal to 90%.    During sleep, the minimum heart rate was 57 bpm, the mean heart rate was 61 bpm, and the maximum heart rate was 80 bpm.    Once the patient met the split-night protocol, the technician performed a positive airway pressure titration.  The technician initiated treatment with CPAP 6 cmH2O and increased the pressure to a maximum of CPAP 13 cmH2O.    The patient did best on CPAP 13 cm water with a resultant AHI of 2.54, a minimum saturation of 88%, and a mean saturation of 94%.  The titration was CPAP at 13 cm water included nonsupine REM.        ASSESSMENT:    Severe obstructive sleep apnea hypopnea - AHI 47.9  Persistent nocturnal desaturation - francesca saturation 80% - saturations <90% for 24.2% of the diagnostic recording  Satisfactory CPAP titration to a best pressure of 13 cm water with a resultant AHI of 2.54, a francesca saturation of 88%, a mean saturation of 94%, and the achievement of nonsupine REM      RECOMMENDATION:    Recommend CPAP 13 cmH2O using a medium Eson 2 mask and heated humidification followed by data card and clinical review in 6-8 weeks.        Dr. Rodriguez  Jarocho LARA

## 2021-07-24 PROCEDURE — 95811 POLYSOM 6/>YRS CPAP 4/> PARM: CPT | Performed by: INTERNAL MEDICINE

## 2021-08-24 ENCOUNTER — HOSPITAL ENCOUNTER (OUTPATIENT)
Dept: LAB | Facility: MEDICAL CENTER | Age: 66
End: 2021-08-24
Attending: INTERNAL MEDICINE
Payer: COMMERCIAL

## 2021-08-24 ENCOUNTER — HOSPITAL ENCOUNTER (OUTPATIENT)
Dept: LAB | Facility: MEDICAL CENTER | Age: 66
End: 2021-08-24
Attending: FAMILY MEDICINE
Payer: COMMERCIAL

## 2021-08-24 DIAGNOSIS — E78.2 MIXED HYPERLIPIDEMIA: ICD-10-CM

## 2021-08-24 LAB
ALBUMIN SERPL BCP-MCNC: 4.3 G/DL (ref 3.2–4.9)
ALBUMIN/GLOB SERPL: 1.7 G/DL
ALP SERPL-CCNC: 56 U/L (ref 30–99)
ALT SERPL-CCNC: 29 U/L (ref 2–50)
ANION GAP SERPL CALC-SCNC: 11 MMOL/L (ref 7–16)
AST SERPL-CCNC: 24 U/L (ref 12–45)
BILIRUB SERPL-MCNC: 0.5 MG/DL (ref 0.1–1.5)
BUN SERPL-MCNC: 11 MG/DL (ref 8–22)
CALCIUM SERPL-MCNC: 9.4 MG/DL (ref 8.5–10.5)
CHLORIDE SERPL-SCNC: 107 MMOL/L (ref 96–112)
CHOLEST SERPL-MCNC: 161 MG/DL (ref 100–199)
CHOLEST SERPL-MCNC: 164 MG/DL (ref 100–199)
CO2 SERPL-SCNC: 26 MMOL/L (ref 20–33)
CREAT SERPL-MCNC: 0.79 MG/DL (ref 0.5–1.4)
FASTING STATUS PATIENT QL REPORTED: NORMAL
FASTING STATUS PATIENT QL REPORTED: NORMAL
GLOBULIN SER CALC-MCNC: 2.6 G/DL (ref 1.9–3.5)
GLUCOSE SERPL-MCNC: 100 MG/DL (ref 65–99)
HDLC SERPL-MCNC: 33 MG/DL
HDLC SERPL-MCNC: 33 MG/DL
LDLC SERPL CALC-MCNC: 85 MG/DL
LDLC SERPL CALC-MCNC: 89 MG/DL
POTASSIUM SERPL-SCNC: 4.1 MMOL/L (ref 3.6–5.5)
PROT SERPL-MCNC: 6.9 G/DL (ref 6–8.2)
SODIUM SERPL-SCNC: 144 MMOL/L (ref 135–145)
TRIGL SERPL-MCNC: 210 MG/DL (ref 0–149)
TRIGL SERPL-MCNC: 217 MG/DL (ref 0–149)

## 2021-08-24 PROCEDURE — 36415 COLL VENOUS BLD VENIPUNCTURE: CPT

## 2021-08-24 PROCEDURE — 80061 LIPID PANEL: CPT | Mod: 91

## 2021-08-24 PROCEDURE — 80061 LIPID PANEL: CPT

## 2021-08-24 PROCEDURE — 80053 COMPREHEN METABOLIC PANEL: CPT

## 2021-09-09 ENCOUNTER — SLEEP CENTER VISIT (OUTPATIENT)
Dept: SLEEP MEDICINE | Facility: MEDICAL CENTER | Age: 66
End: 2021-09-09
Payer: COMMERCIAL

## 2021-09-09 VITALS
DIASTOLIC BLOOD PRESSURE: 70 MMHG | SYSTOLIC BLOOD PRESSURE: 120 MMHG | BODY MASS INDEX: 40.8 KG/M2 | HEIGHT: 70 IN | WEIGHT: 285 LBS | HEART RATE: 71 BPM | RESPIRATION RATE: 16 BRPM | OXYGEN SATURATION: 92 %

## 2021-09-09 DIAGNOSIS — I48.0 PAROXYSMAL ATRIAL FIBRILLATION (HCC): ICD-10-CM

## 2021-09-09 DIAGNOSIS — G47.33 OSA (OBSTRUCTIVE SLEEP APNEA): ICD-10-CM

## 2021-09-09 PROCEDURE — 99214 OFFICE O/P EST MOD 30 MIN: CPT | Performed by: PREVENTIVE MEDICINE

## 2021-09-09 ASSESSMENT — FIBROSIS 4 INDEX: FIB4 SCORE: 1.85

## 2021-09-09 NOTE — PROGRESS NOTES
"CC: \" How I do on my sleep test?\"        HPI:  Michael Villarreal is a 66 y.o.male  kindly referred by Robson Ramos D.O..  This patient has a history of hyperlipidemia, paroxysmal atrial fibrillation, disc metabolic syndrome X and chronic anticoagulation. He was seen by this provider on June 29, 2021 for a sleep medicine consultation.  At that time a split-night study was ordered and the results are as follows:    Sleep study results:  TYPE: Split-night  DATE: July 20, 2021  Diagnostic:AHI: 47.9  Diagnostic  Oxygen Maurice: 80.0  TREATMENT AHI: 19.8  TREATMENT Oxygen Maurice: 81% with 3.3 minutes under 89%  TITRATION: CPAP from 6-13 cmH2O          Patient Active Problem List    Diagnosis Date Noted   • Chronic anticoagulation 12/10/2018   • Non morbid obesity due to excess calories 06/06/2017   • PAF (paroxysmal atrial fibrillation) (HCC) 05/19/2016   • Dysmetabolic syndrome X 05/14/2015   • Mechanical complication due to ocular lens prosthesis 04/20/2015   • Senile nuclear sclerosis 04/01/2015   • Hyperlipidemia 08/16/2013       Past Medical History:   Diagnosis Date   • Daytime sleepiness    • High cholesterol    • Hyperlipidemia    • Hypertension    • Obesity    • Paroxysmal atrial fibrillation (HCC)    • Snoring    • Unspecified cataract         Past Surgical History:   Procedure Laterality Date   • CATARACT PHACO WITH IOL  4/20/2015    Performed by Jakob Garcia M.D. at SURGERY [facility] ORS   • CATARACT PHACO WITH IOL  4/1/2015    Performed by Jakob Garcia M.D. at SURGERY SAME DAY [facility] ORS   • DUPUYTREN CONTRACTURE RELEASE  7/17/2012    Performed by RAFAEL CALVILLO at SURGERY [facility] ORS   • HAND SURGERY     • TONSILLECTOMY         Family History   Problem Relation Age of Onset   • Other Mother 68        kidney CA   • Heart Disease Mother         CHF at 86       Social History     Socioeconomic History   • Marital status:      Spouse name: Not on file   • Number of children: Not on file "   • Years of education: Not on file   • Highest education level: Not on file   Occupational History   • Not on file   Tobacco Use   • Smoking status: Former Smoker     Packs/day: 1.00     Years: 5.00     Pack years: 5.00     Types: Cigarettes     Quit date: 1984     Years since quittin.1   • Smokeless tobacco: Former User     Types: Chew     Quit date: 1984   Vaping Use   • Vaping Use: Never used   Substance and Sexual Activity   • Alcohol use: Yes     Comment: Occasionally   • Drug use: No   • Sexual activity: Not on file   Other Topics Concern   • Not on file   Social History Narrative   • Not on file     Social Determinants of Health     Financial Resource Strain:    • Difficulty of Paying Living Expenses:    Food Insecurity:    • Worried About Running Out of Food in the Last Year:    • Ran Out of Food in the Last Year:    Transportation Needs:    • Lack of Transportation (Medical):    • Lack of Transportation (Non-Medical):    Physical Activity:    • Days of Exercise per Week:    • Minutes of Exercise per Session:    Stress:    • Feeling of Stress :    Social Connections:    • Frequency of Communication with Friends and Family:    • Frequency of Social Gatherings with Friends and Family:    • Attends Church Services:    • Active Member of Clubs or Organizations:    • Attends Club or Organization Meetings:    • Marital Status:    Intimate Partner Violence:    • Fear of Current or Ex-Partner:    • Emotionally Abused:    • Physically Abused:    • Sexually Abused:        Current Outpatient Medications   Medication Sig Dispense Refill   • MAGNESIUM PO Take  by mouth.     • Metoprolol Tartrate 37.5 MG Tab Take 25 mg by mouth 2 times a day. 180 tablet 7   • apixaban (ELIQUIS) 5mg Tab Take 1 tablet by mouth 2 times a day. TAKE ONE TABLET BY MOUTH TWICE A  tablet 7   • pravastatin (PRAVACHOL) 40 MG tablet Take 1 tablet by mouth every day. 90 tablet 7   • metFORMIN ER (GLUCOPHAGE XR) 500 MG TABLET  "SR 24 HR Take 500 mg by mouth every day.     • levothyroxine (SYNTHROID) 150 MCG Tab      • vitamin e (VITAMIN E) 400 UNIT Cap Take 400 Units by mouth every day.     • Coenzyme Q10 (CO Q 10) 100 MG CAPS Take 1 Tab by mouth every day.     • B Complex-C (SUPER B COMPLEX PO) Take 1 Tab by mouth every day.     • vitamin D (CHOLECALCIFEROL) 1000 UNIT TABS Take 1,000 Units by mouth every day.     • zolpidem (AMBIEN) 5 MG Tab Take 1 tablet by mouth at bedtime as needed for Sleep for up to 2 doses. Use only the night of the sleep study. (Patient not taking: Reported on 9/9/2021) 2 tablet 0   • propafenone (RYTHMOL) 150 MG Tab Take 1 tablet by mouth every 8 hours. (Patient not taking: Reported on 6/29/2021) 270 tablet 7     No current facility-administered medications for this visit.    \"CURRENT RX\"    ALLERGIES: Lipitor [atorvastatin]    ROS    Constitutional: Denies fever, chills, sweats,  weight loss, fatigue  Cardiovascular: Denies chest pain, tightness, palpitations, swelling in legs/feet, fainting,  Respiratory: Denies shortness of breath, cough, sputum, wheezing, painful breathing, coughing up blood.   Sleep: per HPI  Gastrointestinal: Denies  difficulty swallowing, nausea, abdominal pain, diarrhea, constipation, heartburn.  Musculoskeletal: Denies painful joints, sore muscles, back pain.        PHYSICAL EXAM      /70 (BP Location: Left arm, Patient Position: Sitting, BP Cuff Size: Large adult)   Pulse 71   Resp 16   Ht 1.778 m (5' 10\")   Wt (!) 129 kg (285 lb)   SpO2 92%   BMI 40.89 kg/m²   Appearance: Well-nourished, looks stated age no acute distress  Eyes:  EOMI   ENMT: masked  Neck:  trachea midline, no masses  Respiratory effort:  No intercostal retractions or use of accessory muscles  Musculoskeletal:  Grossly normal; gait and station normal;  Skin:  No rashes, petechiae, cyanosis  Neurologic:  oriented to person, time, place, and purpose; judgement intact  Psychiatric:  No depression, anxiety, " agitation      Medical Decision Making       The medical record was reviewed in its entirety including the referral notes, records from primary care, consultants notes, hospital records, lab, imaging, microbiology, immunology, and immunizations.  Care gaps identified and reviewed with the patient.    Diagnostic and titration nocturnal polysomnogram's, home sleep apnea tests, continuous nocturnal oximetry results, multiple sleep latency tests, and compliance reports reviewed.    ASSESSMENT:    1. FORREST (obstructive sleep apnea)  - DME CPAP    2. Paroxysmal atrial fibrillation (HCC)  - DME CPAP      PLAN:       A ResMed APAP machine will be ordered    Minimum pressure of  13 with a maximum pressure of 18 cm of water and heated humidification with data available on cloud.. Access to this patient's compliance data and usage data will allow for a further empiric titration. A careful mask fit is required.  Mask per patient's choice.    Insurance requirements for compliance were discussed with patient.  The patient understands that he has 90 days in order to be deemed compliant.  It is during this period of time that he must demonstrate a consistent use of greater than 4 hours/day for a minimum of 29 days continuously.  Patient is aware that it can be done anytime within that 24-hour period as it is additive.  It was recommended to the patient that his begin Pap therapy by using it during the day after setting up his Pap machine near her bed.  Just 30 minutes during the day for the first couple of days will  allow her face and mind to get accustomed to mask and headgear.  Patient was also instructed to keep the machine slightly below the level of the mattress.  This allows for proper humidification of the air and before it reaches her nasal passages and prevents inhaling water droplets.    The risks of untreated sleep apnea were discussed with the patient at length. Patients with FORREST are at increased risk of cardiovascular  disease including coronary artery disease, systemic arterial hypertension, pulmonary arterial hypertension, cardiac arrythmias, and stroke. FORREST patients have an increased risk of motor vehicle accidents, type 2 diabetes, chronic kidney disease, and non-alcoholic liver disease. The patient was advised to avoid driving a motor vehicle when drowsy.    RTC:  8 weeks for 1st compliance check

## 2021-11-04 ENCOUNTER — OFFICE VISIT (OUTPATIENT)
Dept: SLEEP MEDICINE | Facility: MEDICAL CENTER | Age: 66
End: 2021-11-04
Payer: COMMERCIAL

## 2021-11-04 VITALS
HEART RATE: 67 BPM | RESPIRATION RATE: 16 BRPM | HEIGHT: 70 IN | WEIGHT: 287 LBS | DIASTOLIC BLOOD PRESSURE: 84 MMHG | BODY MASS INDEX: 41.09 KG/M2 | OXYGEN SATURATION: 94 % | SYSTOLIC BLOOD PRESSURE: 126 MMHG

## 2021-11-04 DIAGNOSIS — G47.33 OSA ON CPAP: ICD-10-CM

## 2021-11-04 PROCEDURE — 99213 OFFICE O/P EST LOW 20 MIN: CPT | Performed by: PREVENTIVE MEDICINE

## 2021-11-04 ASSESSMENT — FIBROSIS 4 INDEX: FIB4 SCORE: 1.85

## 2021-11-04 NOTE — PROGRESS NOTES
CC: Just having a few problems with mask leak in the middle of the night        HPI:  Michael Villarreal is a 66 y.o.male  kindly referred by Robson Ramos D.O.This patient has a history of hyperlipidemia, paroxysmal atrial fibrillation, disc metabolic syndrome X and chronic anticoagulation. He was seen by this provider on June 29, 2021 for a sleep medicine consultation.  At that time a split-night study was ordered and the results are as follows:     Sleep study results:  TYPE: Split-night  DATE: July 20, 2021  Diagnostic:AHI: 47.9  Diagnostic  Oxygen Maurice: 80.0  TREATMENT AHI: 19.8  TREATMENT Oxygen Maurice: 81% with 3.3 minutes under 89%  TITRATION: CPAP from 6-13 cmH2O  This patient was last seen on September 9, 2021 by this provider and he was started on APAP.  This is his first compliance check but he has been struggling with mask leak.    COMPLIANCE DATA: 20% usage greater than or equal to 4 hours  Machine type: ResMed air sense 10 AutoSet  Date range: October 5, 2021 through November 3, 2021  AHI: 2.4  TIME USED: Average 3 hours and 53 minutes  PRESSURE SETTINGS: Min pressure 13, max pressure 18 cmH2O  Note.:  This patient reports that around 2 in the morning nightly his pillow moves his mask and then it is impossible to reseal.  This is a problem for ongoing use.    Patient Active Problem List    Diagnosis Date Noted   • Chronic anticoagulation 12/10/2018   • Non morbid obesity due to excess calories 06/06/2017   • PAF (paroxysmal atrial fibrillation) (HCC) 05/19/2016   • Dysmetabolic syndrome X 05/14/2015   • Mechanical complication due to ocular lens prosthesis 04/20/2015   • Senile nuclear sclerosis 04/01/2015   • Hyperlipidemia 08/16/2013       Past Medical History:   Diagnosis Date   • Daytime sleepiness    • High cholesterol    • Hyperlipidemia    • Hypertension    • Obesity    • Paroxysmal atrial fibrillation (HCC)    • Snoring    • Unspecified cataract         Past Surgical History:   Procedure  Laterality Date   • CATARACT PHACO WITH IOL  2015    Performed by Jakob Garcia M.D. at SURGERY [facility]   • CATARACT PHACO WITH IOL  2015    Performed by Jakob Garcia M.D. at SURGERY SAME DAY [facility] ORS   • DUPUYTREN CONTRACTURE RELEASE  2012    Performed by RAFAEL CALVILLO at SURGERY [facility] ORS   • HAND SURGERY     • TONSILLECTOMY         Family History   Problem Relation Age of Onset   • Other Mother 68        kidney CA   • Heart Disease Mother         CHF at 86       Social History     Socioeconomic History   • Marital status:      Spouse name: Not on file   • Number of children: Not on file   • Years of education: Not on file   • Highest education level: Not on file   Occupational History   • Not on file   Tobacco Use   • Smoking status: Former Smoker     Packs/day: 1.00     Years: 5.00     Pack years: 5.00     Types: Cigarettes     Quit date: 1984     Years since quittin.3   • Smokeless tobacco: Former User     Types: Chew     Quit date: 1984   Vaping Use   • Vaping Use: Never used   Substance and Sexual Activity   • Alcohol use: Yes     Comment: Occasionally   • Drug use: No   • Sexual activity: Not on file   Other Topics Concern   • Not on file   Social History Narrative   • Not on file     Social Determinants of Health     Financial Resource Strain:    • Difficulty of Paying Living Expenses:    Food Insecurity:    • Worried About Running Out of Food in the Last Year:    • Ran Out of Food in the Last Year:    Transportation Needs:    • Lack of Transportation (Medical):    • Lack of Transportation (Non-Medical):    Physical Activity:    • Days of Exercise per Week:    • Minutes of Exercise per Session:    Stress:    • Feeling of Stress :    Social Connections:    • Frequency of Communication with Friends and Family:    • Frequency of Social Gatherings with Friends and Family:    • Attends Rastafarian Services:    • Active Member of Clubs or Organizations:  "   • Attends Club or Organization Meetings:    • Marital Status:    Intimate Partner Violence:    • Fear of Current or Ex-Partner:    • Emotionally Abused:    • Physically Abused:    • Sexually Abused:        Current Outpatient Medications   Medication Sig Dispense Refill   • MAGNESIUM PO Take  by mouth.     • Metoprolol Tartrate 37.5 MG Tab Take 25 mg by mouth 2 times a day. 180 tablet 7   • apixaban (ELIQUIS) 5mg Tab Take 1 tablet by mouth 2 times a day. TAKE ONE TABLET BY MOUTH TWICE A  tablet 7   • pravastatin (PRAVACHOL) 40 MG tablet Take 1 tablet by mouth every day. 90 tablet 7   • metFORMIN ER (GLUCOPHAGE XR) 500 MG TABLET SR 24 HR Take 500 mg by mouth every day.     • levothyroxine (SYNTHROID) 150 MCG Tab      • vitamin e (VITAMIN E) 400 UNIT Cap Take 400 Units by mouth every day.     • Coenzyme Q10 (CO Q 10) 100 MG CAPS Take 1 Tab by mouth every day.     • B Complex-C (SUPER B COMPLEX PO) Take 1 Tab by mouth every day.     • vitamin D (CHOLECALCIFEROL) 1000 UNIT TABS Take 1,000 Units by mouth every day.     • zolpidem (AMBIEN) 5 MG Tab Take 1 tablet by mouth at bedtime as needed for Sleep for up to 2 doses. Use only the night of the sleep study. (Patient not taking: Reported on 9/9/2021) 2 tablet 0   • propafenone (RYTHMOL) 150 MG Tab Take 1 tablet by mouth every 8 hours. (Patient not taking: Reported on 6/29/2021) 270 tablet 7     No current facility-administered medications for this visit.    \"CURRENT RX\"    ALLERGIES: Lipitor [atorvastatin]    ROS    Constitutional: Denies  weight loss, fatigue  Cardiovascular: Denies chest pain, tightness, palpitations, swelling in legs/feet, difficulty breathing when lying down but gets better when sitting up.   Respiratory: Denies shortness of breath during the day  Sleep: per HPI  Gastrointestinal: Denies  difficulty swallowing, heartburn.  Musculoskeletal: Denies painful joints, sore muscles, back pain.        PHYSICAL EXAM       /84 (BP Location: Left " "arm, Patient Position: Sitting, BP Cuff Size: Large adult)   Pulse 67   Resp 16   Ht 1.778 m (5' 10\")   Wt (!) 130 kg (287 lb)   SpO2 94%   BMI 41.18 kg/m²   Appearance: Well-nourished, looks stated age, no acute distress  Eyes:   EOMI  ENMT: masked  Neck: Supple, trachea midline, no masses  Respiratory effort:  No intercostal retractions or use of accessory muscles  Lung auscultation:  No wheezes rhonchi rubs or rales  Cardiac: No murmurs, rubs, or gallops; regular rhythm, normal rate; no edema  Musculoskeletal:  Grossly normal; gait and station normal; digits and nails normal  Skin:  No cyanosis  Neurologic: oriented to person, time, place, and purpose; judgement intact  Psychiatric:  No depression, anxiety, agitation      Medical Decision Making       The medical record was reviewed in its entirety including the referral notes, records from primary care, consultants notes, hospital records, lab, imaging, microbiology, immunology, and immunizations.  Care gaps identified and reviewed with the patient.    Diagnostic and titration nocturnal polysomnogram's, home sleep apnea tests, continuous nocturnal oximetry results, multiple sleep latency tests, and compliance reports reviewed.    ASSESSMENT:  1. FORREST on CPAP          PLAN:   Has been advised to continue the current PAP tx, clean equipment frequently, and get new mask and supplies as allowed by insurance and DME.  To address the 2 AM awakening due to mask leak patient is instructed to turn the machine off and turn it back on.  This will allow him to restart on a ramp that will not be jarring and allow him to fall asleep comfortably and he can do this each time he has a difficult time resealing his mask.  Alternatively he could return his current air fit F 30 I and obtain a DreamWear full facemask as the DreamWear full facemask was designed for patients who tend to sleep prone.  And thirdly he has been instructed to purchase a CPAP pillow to allow for his " various preferred positions of sleep.       Recommend an earlier appointment, if significant treatment barriers persist.    The risks of untreated sleep apnea were discussed with the patient at length. Patients with FORREST are at increased risk of cardiovascular disease including coronary artery disease, systemic arterial hypertension, pulmonary arterial hypertension, cardiac arrhythmias, and stroke. FORREST patients have an increased risk of motor vehicle accidents, type 2 diabetes, chronic kidney disease, and non-alcoholic liver disease. The patient was advised to avoid driving a motor vehicle when drowsy.      Have advised the patient to follow up with the appropriate healthcare practitioners for all other medical problems and issues.      Return in about 10 weeks (around 1/13/2022) for Compliance check, With any provider available.  Please BRING  MACHINE to the next visit if you are having difficulties with use.

## 2021-11-28 ENCOUNTER — OFFICE VISIT (OUTPATIENT)
Dept: URGENT CARE | Facility: PHYSICIAN GROUP | Age: 66
End: 2021-11-28
Payer: COMMERCIAL

## 2021-11-28 VITALS
OXYGEN SATURATION: 95 % | HEIGHT: 70 IN | HEART RATE: 68 BPM | SYSTOLIC BLOOD PRESSURE: 118 MMHG | RESPIRATION RATE: 18 BRPM | DIASTOLIC BLOOD PRESSURE: 74 MMHG | BODY MASS INDEX: 39.37 KG/M2 | TEMPERATURE: 97 F | WEIGHT: 275 LBS

## 2021-11-28 DIAGNOSIS — M79.89 SWELLING OF RIGHT HAND: ICD-10-CM

## 2021-11-28 DIAGNOSIS — Z79.01 CHRONIC ANTICOAGULATION: ICD-10-CM

## 2021-11-28 PROCEDURE — 99202 OFFICE O/P NEW SF 15 MIN: CPT | Performed by: NURSE PRACTITIONER

## 2021-11-28 RX ORDER — POTASSIUM CHLORIDE 20 MEQ/1
TABLET, EXTENDED RELEASE ORAL
COMMUNITY
Start: 2021-08-31 | End: 2021-12-10

## 2021-11-28 ASSESSMENT — FIBROSIS 4 INDEX: FIB4 SCORE: 1.85

## 2021-12-08 NOTE — PROGRESS NOTES
Subjective:   Chief Complaint:   Chief Complaint   Patient presents with   • Atrial Fibrillation     F/V Dx: PAF (paroxysmal atrial fibrillation) (HCC)   • Hyperlipidemia     Michael Villarreal is a 66 y.o. male who returns today for symp paroxysmal atrial fibrillation, chronic anticoagulation, hypertension, HLP, statin intolerance, FORREST, IFG.    He had been doing propafenone only BID and meto BID, rare breakthrough now.    We tried flecainide twice daily but this was not effective, in fact he thought he had more afib.  Getting afib, up to 3 hours (reviewed Kardia mobile strips in the past), feels heart racing, lightheaded, SOB, . Prior to meds, afib was over 160.  He takes extra propafenone when this happens and it works.  He had been drinking heavily the night before.   He does not drink as often.  Started walking again, previously limited by knee injury, plantar fasciitis.    TSH ok 9-2020.    Chads 2 vascular score is 2, not clear, borderline HTN, has IFG.  Remains on apixaban, no bleeding.    Has FORREST now, on CPAP.    Echocardiogram 2019 with normal size left atrium, normal EF, no significant abnormalities.    Had a PET nuclear stress test in 2018 which showed normal perfusion.  Remote Holter 2015 with brief runs of paroxysmal atrial tachycardia but no A. fib, some PVCs and PACs.    Probably HTN, on metoprolol, controlled.    Has HLP, pravastatin 40 mg.  Had myalgias on statin.  Had been on  vascepa with  Coupon but not indicated.  Has not had pancreatitis or triglycerides greater than 500, so I stopped fenofibrate, doing ok.  , LDL 85, HDL low.    No family history of premature coronary artery disease.  Former smoker, remote.  No history of autoimmune disease such as lupus or rheumatoid arthritis.  No chronic kidney disease.    Again, limited by knee/foot pain/sciatica.  Has mild BARROSO and now some LE edema.  He is not limited by chest pain, pressure or tightness with activity.   No orthopnea.   No  presyncope/syncope.   No symptoms of leg claudication.   No stroke/TIA like symptoms.    His father still alive.  Mother  of CHF at 87.    .    DATA REVIEWED by me:  ECG 12-10-21  Sinus, 57, NS IVCD    ECG 19  Sinus, 64, NS IVCD, baseline wandering.    ECG 10/24/2014  Sinus with frequent PACs, delayed R wave progression, supraventricular bigeminy, left axis deviation, nonspecific diffuse repolarization abnormality    Holter monitor   No A. Fib, rare PVCs, rare PACs, short runs of paroxysmal atrial tachycardia    Echo 19  Normal left ventricular chamber size.  Left ventricular ejection fraction is visually estimated to be 70%.  Normal diastolic function.  No significant valve abnormalities.   Normal inferior vena cava size and inspiratory collapse.    Echo 2018  Normal left ventricular chamber size.  Left ventricular ejection fraction is visually estimated to be 70%.  Normal diastolic function.  No significant valve abnormalities.   Normal inferior vena cava size and inspiratory collapse.  Normal-sized left atrium    Echocardiogram, 2015:  Normal left ventricular systolic function.  Left ventricular ejection fraction is visually estimated to be 70%.  Mild mitral regurgitation.  Right ventricular systolic pressure is estimated to be 35 mmHg     PET myocardial perfusion imaging, 2018:  ELECTROCARDIOGRAPHIC FINDINGS:  Show a normal sinus rhythm with no ischemic ST segment changes at rest or peak stress.  SCINTOGRAPHIC FINDINGS:  Show normal homogeneous tracer uptake at rest and peak stress.  GATED WALL MOTION FINDINGS:  Show normal LV systolic function with a resting ejection fraction of 62%, which increased to 72% at peak stress.  CONCLUSIONS AND IMPRESSION:  Normal stress test    Treadmill stress test 10/23/2015  Baseline ECG: normal sinus rhythm, rate 60. No ischemic changes.  Exercise ECG: Good exercise capacity, achieving an adequate  cardiac workload; 9 minutes 15 seconds  of a standard Surendra  protocol, 10.6 METS, 85% of Maximum Age Predicted Heart Rate  (MAPHR).   No arrhythmias.  Normal blood pressure response to exercise.   There is no clinical or electrocardiographic evidence of  ischemia.  Rodriges score places the patient at low risk.    Most recent labs:     Lab Results   Component Value Date/Time    HEMOGLOBIN 16.3 09/25/2020 06:09 AM    HEMATOCRIT 46.6 09/25/2020 06:09 AM    MCV 89.6 09/25/2020 06:09 AM    INR 0.95 10/28/2014 08:27 PM      Lab Results   Component Value Date/Time    SODIUM 144 08/24/2021 08:43 AM    POTASSIUM 4.1 08/24/2021 08:43 AM    CHLORIDE 107 08/24/2021 08:43 AM    CO2 26 08/24/2021 08:43 AM    GLUCOSE 100 (H) 08/24/2021 08:43 AM    BUN 11 08/24/2021 08:43 AM    CREATININE 0.79 08/24/2021 08:43 AM      Lab Results   Component Value Date/Time    ASTSGOT 24 08/24/2021 08:43 AM    ALTSGPT 29 08/24/2021 08:43 AM    ALBUMIN 4.3 08/24/2021 08:43 AM      Lab Results   Component Value Date/Time    CHOLSTRLTOT 161 08/24/2021 08:43 AM    CHOLSTRLTOT 164 08/24/2021 08:43 AM    LDL 85 08/24/2021 08:43 AM    LDL 89 08/24/2021 08:43 AM    HDL 33 (A) 08/24/2021 08:43 AM    HDL 33 (A) 08/24/2021 08:43 AM    TRIGLYCERIDE 217 (H) 08/24/2021 08:43 AM    TRIGLYCERIDE 210 (H) 08/24/2021 08:43 AM       10/7/2019 sodium 145 5, potassium 4, creatinine 0.85, hemoglobin A1c 5.7, TSH 1.15  9/19/2019 HDL low at 37, LDL 78, triglycerides 116, total cholesterol 138  7/18/2019 LFTs normal  10/13/2017 hemoglobin 16.1, platelets 179    Past Medical History:   Diagnosis Date   • Daytime sleepiness    • High cholesterol    • Hyperlipidemia    • Hypertension    • Obesity    • Paroxysmal atrial fibrillation (HCC)    • Snoring    • Unspecified cataract      Past Surgical History:   Procedure Laterality Date   • CATARACT PHACO WITH IOL  4/20/2015    Performed by Jakob Garcia M.D. at [facility]   • CATARACT PHACO WITH IOL  4/1/2015    Performed by Jakob Garcia M.D. at [facility]   • DUPUYTREN CONTRACTURE RELEASE  2012    Performed by RAFAEL CALVILLO at [facility]   • HAND SURGERY     • TONSILLECTOMY       Family History   Problem Relation Age of Onset   • Other Mother 68        kidney CA   • Heart Disease Mother         CHF at 86     Social History     Socioeconomic History   • Marital status:      Spouse name: Not on file   • Number of children: Not on file   • Years of education: Not on file   • Highest education level: Not on file   Occupational History   • Not on file   Tobacco Use   • Smoking status: Former Smoker     Packs/day: 1.00     Years: 5.00     Pack years: 5.00     Types: Cigarettes     Quit date: 1984     Years since quittin.4   • Smokeless tobacco: Former User     Types: Chew     Quit date: 1984   Vaping Use   • Vaping Use: Never used   Substance and Sexual Activity   • Alcohol use: Yes     Comment: Occasionally   • Drug use: No   • Sexual activity: Not on file   Other Topics Concern   • Not on file   Social History Narrative   • Not on file     Social Determinants of Health     Financial Resource Strain:    • Difficulty of Paying Living Expenses: Not on file   Food Insecurity:    • Worried About Running Out of Food in the Last Year: Not on file   • Ran Out of Food in the Last Year: Not on file   Transportation Needs:    • Lack of Transportation (Medical): Not on file   • Lack of Transportation (Non-Medical): Not on file   Physical Activity:    • Days of Exercise per Week: Not on file   • Minutes of Exercise per Session: Not on file   Stress:    • Feeling of Stress : Not on file   Social Connections:    • Frequency of Communication with Friends and Family: Not on file   • Frequency of Social Gatherings with Friends and Family: Not on file   • Attends [employer] Services: Not on file   • Active Member of Clubs or Organizations: Not on file   • Attends Club or Organization Meetings: Not on file   • Marital Status:  "Not on file   Intimate Partner Violence:    • Fear of Current or Ex-Partner: Not on file   • Emotionally Abused: Not on file   • Physically Abused: Not on file   • Sexually Abused: Not on file   Housing Stability:    • Unable to Pay for Housing in the Last Year: Not on file   • Number of Places Lived in the Last Year: Not on file   • Unstable Housing in the Last Year: Not on file     Allergies   Allergen Reactions   • Lipitor [Atorvastatin]      Myalgias       Current Outpatient Medications   Medication Sig Dispense Refill   • metoprolol tartrate (LOPRESSOR) 25 MG Tab Take 1 Tablet by mouth 2 times a day. 180 Tablet 7   • propafenone (RYTHMOL) 150 MG Tab Take 1 Tablet by mouth every 8 hours. 270 Tablet 7   • pravastatin (PRAVACHOL) 40 MG tablet Take 1 Tablet by mouth every day. 90 Tablet 7   • apixaban (ELIQUIS) 5mg Tab Take 1 Tablet by mouth 2 times a day. TAKE ONE TABLET BY MOUTH TWICE A  Tablet 7   • potassium chloride SA (KDUR) 20 MEQ Tab CR Take 1 Tablet by mouth every day. 90 Tablet 7   • MAGNESIUM PO Take  by mouth.     • metFORMIN ER (GLUCOPHAGE XR) 500 MG TABLET SR 24 HR Take 500 mg by mouth every day.     • levothyroxine (SYNTHROID) 150 MCG Tab      • vitamin e (VITAMIN E) 400 UNIT Cap Take 400 Units by mouth every day.     • Coenzyme Q10 (CO Q 10) 100 MG CAPS Take 1 Tab by mouth every day.     • B Complex-C (SUPER B COMPLEX PO) Take 1 Tab by mouth every day.     • vitamin D (CHOLECALCIFEROL) 1000 UNIT TABS Take 1,000 Units by mouth every day.       No current facility-administered medications for this visit.       ROS    All others systems reviewed and negative.     Objective:     BP (!) 0/0 (BP Location: Left arm, Patient Position: Sitting, BP Cuff Size: Large adult)   Pulse 68   Resp 14   Ht 1.778 m (5' 10\")   Wt (!) 131 kg (289 lb 9.6 oz)   SpO2 94%  Body mass index is 41.55 kg/m². /80    General: No acute distress. Well nourished.  HEENT: EOM grossly intact, no scleral icterus, no " pharyngeal erythema.   Neck:  No JVD, no bruits, trachea midline  CVS: RRR. Normal S1, S2. No M/R/G. No LE edema.  2+ radial pulses, 2+ PT pulses  Resp: CTAB. No wheezing or crackles/rhonchi. Normal respiratory effort.  Abdomen: Soft, NT, no fatimah hepatomegaly.  MSK/Ext: No clubbing or cyanosis.  Skin: Warm and dry, no rashes.  Neurological: CN III-XII grossly intact. No focal deficits.   Psych: A&O x 3, appropriate affect, good judgement    Physical exam performed today and unchanged, except what is noted, compared to 6-23-21      Assessment:     1. PAF (paroxysmal atrial fibrillation) (HCC)  apixaban (ELIQUIS) 5mg Tab   2. Chronic anticoagulation     3. Essential hypertension     4. Dysmetabolic syndrome X     5. Other chest pain     6. Mixed hyperlipidemia     7. PVC (premature ventricular contraction)     8. Former tobacco use     9. Lower extremity edema     10. Long term current use of antiarrhythmic drug  EKG    EKG   11. FORREST (obstructive sleep apnea)     12. Premature atrial contraction     13. Non morbid obesity due to excess calories     14. FORREST on CPAP     15. Nonspecific intraventricular block         Medical Decision Making:  Today's Assessment / Status / Plan:     -PAF breakthroughs are few and far between and the extra dose of Propafenone is working  -He prefers to take the Propafenone twice a day and a third 1 as needed  -ECG today  -Working on getting a good CPAP for his mild FORREST  -Blood pressure controlled  -Triglycerides doing okay off of the fenofibrate so discontinue pravastatin  -AAA screening when he is ready  -Propafenone is a high risk medication requiring annual ecg, annual electrolytes and sometimes treadmill stress test.  -Getting blood work in Feb  -If we get back into uncontrolled A. fib, I would refer him to EP for ablation, or if he wants to get off of 1C meds  -NS IVCD not significantly changed, could consider treadmill in future to assess further if concerns  -RTC 6 months    Return  in about 6 months (around 6/10/2022).    It is my pleasure to participate in the care of Mr. Villarreal.  Please do not hesitate to contact me with questions or concerns.    Jane Mcgill MD, [facility]  Cardiologist [facility]    Please note that this dictation was created using voice recognition software. I have made every reasonable attempt to correct obvious errors, but it is possible there are errors of grammar and possibly content that I did not discover before finalizing the note.

## 2021-12-10 ENCOUNTER — OFFICE VISIT (OUTPATIENT)
Dept: CARDIOLOGY | Facility: MEDICAL CENTER | Age: 66
End: 2021-12-10
Payer: COMMERCIAL

## 2021-12-10 VITALS
BODY MASS INDEX: 41.46 KG/M2 | HEART RATE: 68 BPM | WEIGHT: 289.6 LBS | RESPIRATION RATE: 14 BRPM | HEIGHT: 70 IN | OXYGEN SATURATION: 94 %

## 2021-12-10 DIAGNOSIS — Z79.899 LONG TERM CURRENT USE OF ANTIARRHYTHMIC DRUG: ICD-10-CM

## 2021-12-10 DIAGNOSIS — Z79.01 CHRONIC ANTICOAGULATION: ICD-10-CM

## 2021-12-10 DIAGNOSIS — E66.09 NON MORBID OBESITY DUE TO EXCESS CALORIES: ICD-10-CM

## 2021-12-10 DIAGNOSIS — E88.810 DYSMETABOLIC SYNDROME X: ICD-10-CM

## 2021-12-10 DIAGNOSIS — R60.0 LOWER EXTREMITY EDEMA: ICD-10-CM

## 2021-12-10 DIAGNOSIS — G47.33 OSA (OBSTRUCTIVE SLEEP APNEA): ICD-10-CM

## 2021-12-10 DIAGNOSIS — E78.2 MIXED HYPERLIPIDEMIA: ICD-10-CM

## 2021-12-10 DIAGNOSIS — Z87.891 FORMER TOBACCO USE: ICD-10-CM

## 2021-12-10 DIAGNOSIS — R07.89 OTHER CHEST PAIN: ICD-10-CM

## 2021-12-10 DIAGNOSIS — I49.1 PREMATURE ATRIAL CONTRACTION: ICD-10-CM

## 2021-12-10 DIAGNOSIS — I10 ESSENTIAL HYPERTENSION: ICD-10-CM

## 2021-12-10 DIAGNOSIS — I48.0 PAF (PAROXYSMAL ATRIAL FIBRILLATION) (HCC): ICD-10-CM

## 2021-12-10 DIAGNOSIS — I49.3 PVC (PREMATURE VENTRICULAR CONTRACTION): ICD-10-CM

## 2021-12-10 DIAGNOSIS — G47.33 OSA ON CPAP: ICD-10-CM

## 2021-12-10 DIAGNOSIS — I45.4 NONSPECIFIC INTRAVENTRICULAR BLOCK: ICD-10-CM

## 2021-12-10 LAB — EKG IMPRESSION: NORMAL

## 2021-12-10 PROCEDURE — 99214 OFFICE O/P EST MOD 30 MIN: CPT | Performed by: INTERNAL MEDICINE

## 2021-12-10 PROCEDURE — 93000 ELECTROCARDIOGRAM COMPLETE: CPT | Performed by: INTERNAL MEDICINE

## 2021-12-10 RX ORDER — PRAVASTATIN SODIUM 40 MG
40 TABLET ORAL DAILY
Qty: 90 TABLET | Refills: 7 | Status: SHIPPED | OUTPATIENT
Start: 2021-12-10 | End: 2022-06-09 | Stop reason: SDUPTHER

## 2021-12-10 RX ORDER — POTASSIUM CHLORIDE 20 MEQ/1
20 TABLET, EXTENDED RELEASE ORAL DAILY
Qty: 90 TABLET | Refills: 7 | Status: SHIPPED | OUTPATIENT
Start: 2021-12-10 | End: 2022-06-09 | Stop reason: SDUPTHER

## 2021-12-10 RX ORDER — PROPAFENONE HYDROCHLORIDE 150 MG/1
150 TABLET, COATED ORAL EVERY 8 HOURS
Qty: 270 TABLET | Refills: 7 | Status: SHIPPED | OUTPATIENT
Start: 2021-12-10 | End: 2022-06-09 | Stop reason: SDUPTHER

## 2021-12-10 ASSESSMENT — FIBROSIS 4 INDEX: FIB4 SCORE: 1.85

## 2021-12-10 NOTE — LETTER
[facility]   [address], [address]  [place] [address]  Phone: [phone]  Fax: [phone]              Michael Villarreal  1955    Encounter Date: 12/10/2021    Jane Mcgill M.D.          PROGRESS NOTE:  Subjective:   Chief Complaint:   Chief Complaint   Patient presents with   • Atrial Fibrillation     F/V Dx: PAF (paroxysmal atrial fibrillation) (HCC)   • Hyperlipidemia     Michael Villarreal is a 66 y.o. male who returns today for symp paroxysmal atrial fibrillation, chronic anticoagulation, hypertension, HLP, statin intolerance, FORREST, IFG.    He had been doing propafenone only BID and meto BID, rare breakthrough now.    We tried flecainide twice daily but this was not effective, in fact he thought he had more afib.  Getting afib, up to 3 hours (reviewed MOO.COMa mobile strips in the past), feels heart racing, lightheaded, SOB, . Prior to meds, afib was over 160.  He takes extra propafenone when this happens and it works.  He had been drinking heavily the night before.   He does not drink as often.  Started walking again, previously limited by knee injury, plantar fasciitis.    TSH ok 9-2020.    Chads 2 vascular score is 2, not clear, borderline HTN, has IFG.  Remains on apixaban, no bleeding.    Has FORREST now, on CPAP.    Echocardiogram 2019 with normal size left atrium, normal EF, no significant abnormalities.    Had a PET nuclear stress test in 2018 which showed normal perfusion.  Remote Holter 2015 with brief runs of paroxysmal atrial tachycardia but no A. fib, some PVCs and PACs.    Probably HTN, on metoprolol, controlled.    Has HLP, pravastatin 40 mg.  Had myalgias on statin.  Had been on  vascepa with  Coupon but not indicated.  Has not had pancreatitis or triglycerides greater than 500, so I stopped fenofibrate, doing ok.  , LDL 85, HDL low.    No family history of premature coronary artery disease.  Former smoker, remote.  No history  of autoimmune disease such as lupus or rheumatoid arthritis.  No chronic kidney disease.    Again, limited by knee/foot pain/sciatica.  Has mild BARROSO and now some LE edema.  He is not limited by chest pain, pressure or tightness with activity.   No orthopnea.   No presyncope/syncope.   No symptoms of leg claudication.   No stroke/TIA like symptoms.    His father still alive.  Mother  of CHF at 87.    .    DATA REVIEWED by me:  ECG 12-10-21  Sinus,     ECG 19  Sinus, 64, NS IVCD, baseline wandering.    ECG 10/24/2014  Sinus with frequent PACs, delayed R wave progression, supraventricular bigeminy, left axis deviation, nonspecific diffuse repolarization abnormality    Holter monitor   No A. Fib, rare PVCs, rare PACs, short runs of paroxysmal atrial tachycardia    Echo 19  Normal left ventricular chamber size.  Left ventricular ejection fraction is visually estimated to be 70%.  Normal diastolic function.  No significant valve abnormalities.   Normal inferior vena cava size and inspiratory collapse.    Echo 2018  Normal left ventricular chamber size.  Left ventricular ejection fraction is visually estimated to be 70%.  Normal diastolic function.  No significant valve abnormalities.   Normal inferior vena cava size and inspiratory collapse.  Normal-sized left atrium    Echocardiogram, 2015:  Normal left ventricular systolic function.  Left ventricular ejection fraction is visually estimated to be 70%.  Mild mitral regurgitation.  Right ventricular systolic pressure is estimated to be 35 mmHg     PET myocardial perfusion imaging, 2018:  ELECTROCARDIOGRAPHIC FINDINGS:  Show a normal sinus rhythm with no ischemic ST segment changes at rest or peak stress.  SCINTOGRAPHIC FINDINGS:  Show normal homogeneous tracer uptake at rest and peak stress.  GATED WALL MOTION FINDINGS:  Show normal LV systolic function with a resting ejection fraction of 62%, which increased to 72% at peak  stress.  CONCLUSIONS AND IMPRESSION:  Normal stress test    Treadmill stress test 10/23/2015  Baseline ECG: normal sinus rhythm, rate 60. No ischemic changes.  Exercise ECG: Good exercise capacity, achieving an adequate  cardiac workload; 9 minutes 15 seconds of a standard Surendra  protocol, 10.6 METS, 85% of Maximum Age Predicted Heart Rate  (MAPHR).   No arrhythmias.  Normal blood pressure response to exercise.   There is no clinical or electrocardiographic evidence of  ischemia.  Rodriges score places the patient at low risk.    Most recent labs:     Lab Results   Component Value Date/Time    HEMOGLOBIN 16.3 09/25/2020 06:09 AM    HEMATOCRIT 46.6 09/25/2020 06:09 AM    MCV 89.6 09/25/2020 06:09 AM    INR 0.95 10/28/2014 08:27 PM      Lab Results   Component Value Date/Time    SODIUM 144 08/24/2021 08:43 AM    POTASSIUM 4.1 08/24/2021 08:43 AM    CHLORIDE 107 08/24/2021 08:43 AM    CO2 26 08/24/2021 08:43 AM    GLUCOSE 100 (H) 08/24/2021 08:43 AM    BUN 11 08/24/2021 08:43 AM    CREATININE 0.79 08/24/2021 08:43 AM      Lab Results   Component Value Date/Time    ASTSGOT 24 08/24/2021 08:43 AM    ALTSGPT 29 08/24/2021 08:43 AM    ALBUMIN 4.3 08/24/2021 08:43 AM      Lab Results   Component Value Date/Time    CHOLSTRLTOT 161 08/24/2021 08:43 AM    CHOLSTRLTOT 164 08/24/2021 08:43 AM    LDL 85 08/24/2021 08:43 AM    LDL 89 08/24/2021 08:43 AM    HDL 33 (A) 08/24/2021 08:43 AM    HDL 33 (A) 08/24/2021 08:43 AM    TRIGLYCERIDE 217 (H) 08/24/2021 08:43 AM    TRIGLYCERIDE 210 (H) 08/24/2021 08:43 AM       10/7/2019 sodium 145 5, potassium 4, creatinine 0.85, hemoglobin A1c 5.7, TSH 1.15  9/19/2019 HDL low at 37, LDL 78, triglycerides 116, total cholesterol 138  7/18/2019 LFTs normal  10/13/2017 hemoglobin 16.1, platelets 179    Past Medical History:   Diagnosis Date   • Daytime sleepiness    • High cholesterol    • Hyperlipidemia    • Hypertension    • Obesity    • Paroxysmal atrial fibrillation (HCC)    • Snoring    •  Unspecified cataract      Past Surgical History:   Procedure Laterality Date   • CATARACT PHACO WITH IOL  2015    Performed by Jakob Garcia M.D. at SURGERY [facility] ORS   • CATARACT PHACO WITH IOL  2015    Performed by Jakob Garcia M.D. at SURGERY SAME DAY [facility] ORS   • DUPUYTREN CONTRACTURE RELEASE  2012    [facility] RA[facility]ILL[facility]   • HAND SURGERY     • TONSILLECTOMY       Family History   Problem Relation Age of Onset   • Other Mother 68        kidney CA   • Heart Disease Mother         CHF at 86     Social History     Socioeconomic History   • Marital status:      Spouse name: Not on file   • Number of children: Not on file   • Years of education: Not on file   • Highest education level: Not on file   Occupational History   • Not on file   Tobacco Use   • Smoking status: Former Smoker     Packs/day: 1.00     Years: 5.00     Pack years: 5.00     Types: Cigarettes     Quit date: 1984     Years since quittin.4   • Smokeless tobacco: Former User     Types: Chew     Quit date: 1984   Vaping Use   • Vaping Use: Never used   Substance and Sexual Activity   • Alcohol use: Yes     Comment: Occasionally   • Drug use: No   • Sexual activity: Not on file   Other Topics Concern   • Not on file   Social History Narrative   • Not on file     Social Determinants of Health     Financial Resource Strain:    • Difficulty of Paying Living Expenses: Not on file   Food Insecurity:    • Worried About Running Out of Food in the Last Year: Not on file   • Ran Out of Food in the Last Year: Not on file   Transportation Needs:    • Lack of Transportation (Medical): Not on file   • Lack of Transportation (Non-Medical): Not on file   Physical Activity:    • Days of Exercise per Week: Not on file   • Minutes of Exercise per Session: Not on file   Stress:    • Feeling of Stress : Not on file   Social Connections:    • Frequency of Communication with Friends and  Family: Not on file   • Frequency of Social Gatherings with Friends and Family: Not on file   • Attends Bahai Services: Not on file   • Active Member of Clubs or Organizations: Not on file   • Attends Club or Organization Meetings: Not on file   • Marital Status: Not on file   Intimate Partner Violence:    • Fear of Current or Ex-Partner: Not on file   • Emotionally Abused: Not on file   • Physically Abused: Not on file   • Sexually Abused: Not on file   Housing Stability:    • Unable to Pay for Housing in the Last Year: Not on file   • Number of Places Lived in the Last Year: Not on file   • Unstable Housing in the Last Year: Not on file     Allergies   Allergen Reactions   • Lipitor [Atorvastatin]      Myalgias       Current Outpatient Medications   Medication Sig Dispense Refill   • metoprolol tartrate (LOPRESSOR) 25 MG Tab Take 1 Tablet by mouth 2 times a day. 180 Tablet 7   • propafenone (RYTHMOL) 150 MG Tab Take 1 Tablet by mouth every 8 hours. 270 Tablet 7   • pravastatin (PRAVACHOL) 40 MG tablet Take 1 Tablet by mouth every day. 90 Tablet 7   • apixaban (ELIQUIS) 5mg Tab Take 1 Tablet by mouth 2 times a day. TAKE ONE TABLET BY MOUTH TWICE A  Tablet 7   • potassium chloride SA (KDUR) 20 MEQ Tab CR Take 1 Tablet by mouth every day. 90 Tablet 7   • MAGNESIUM PO Take  by mouth.     • metFORMIN ER (GLUCOPHAGE XR) 500 MG TABLET SR 24 HR Take 500 mg by mouth every day.     • levothyroxine (SYNTHROID) 150 MCG Tab      • vitamin e (VITAMIN E) 400 UNIT Cap Take 400 Units by mouth every day.     • Coenzyme Q10 (CO Q 10) 100 MG CAPS Take 1 Tab by mouth every day.     • B Complex-C (SUPER B COMPLEX PO) Take 1 Tab by mouth every day.     • vitamin D (CHOLECALCIFEROL) 1000 UNIT TABS Take 1,000 Units by mouth every day.       No current facility-administered medications for this visit.       ROS    All others systems reviewed and negative.     Objective:     BP (!) 0/0 (BP Location: Left arm, Patient Position:  "Sitting, BP Cuff Size: Large adult)   Pulse 68   Resp 14   Ht 1.778 m (5' 10\")   Wt (!) 131 kg (289 lb 9.6 oz)   SpO2 94%  Body mass index is 41.55 kg/m². /80    General: No acute distress. Well nourished.  HEENT: EOM grossly intact, no scleral icterus, no pharyngeal erythema.   Neck:  No JVD, no bruits, trachea midline  CVS: RRR. Normal S1, S2. No M/R/G. No LE edema.  2+ radial pulses, 2+ PT pulses  Resp: CTAB. No wheezing or crackles/rhonchi. Normal respiratory effort.  Abdomen: Soft, NT, no fatimah hepatomegaly.  MSK/Ext: No clubbing or cyanosis.  Skin: Warm and dry, no rashes.  Neurological: CN III-XII grossly intact. No focal deficits.   Psych: A&O x 3, appropriate affect, good judgement    Physical exam performed today and unchanged, except what is noted, compared to 6-23-21      Assessment:     1. PAF (paroxysmal atrial fibrillation) (HCC)  apixaban (ELIQUIS) 5mg Tab   2. Chronic anticoagulation     3. Essential hypertension     4. Dysmetabolic syndrome X     5. Other chest pain     6. Mixed hyperlipidemia     7. PVC (premature ventricular contraction)     8. Former tobacco use     9. Lower extremity edema     10. Long term current use of antiarrhythmic drug  EKG   11. FORREST (obstructive sleep apnea)     12. Premature atrial contraction     13. Non morbid obesity due to excess calories     14. FORREST on CPAP         Medical Decision Making:  Today's Assessment / Status / Plan:     -PAF breakthroughs are few and far between and the extra dose of Propafenone is working  -He prefers to take the Propafenone twice a day and a third 1 as needed  -ECG today  -Working on getting a good CPAP for his mild FORREST  -Blood pressure controlled  -Triglycerides doing okay off of the fenofibrate so discontinue pravastatin  -AAA screening when he is ready  -Propafenone is a high risk medication requiring annual ecg, annual electrolytes and sometimes treadmill stress test.  -Getting blood work in Feb  -If we get back into " uncontrolled A. fib, I would refer him to EP for ablation, or if he wants to get off of 1C meds  -RTC 6 months    Return in about 6 months (around 6/10/2022).    It is my pleasure to participate in the care of Mr. Villarreal.  Please do not hesitate to contact me with questions or concerns.    Jane Mcgill MD, [facility]  Cardiologist [facility]    Please note that this dictation was created using voice recognition software. I have made every reasonable attempt to correct obvious errors, but it is possible there are errors of grammar and possibly content that I did not discover before finalizing the note.        Robson Ramos D.O.  [address] [address]  Via Fax: [phone]

## 2021-12-12 NOTE — PROGRESS NOTES
Subjective     Michael Villarreal is a 66 y.o. male who presents with Hand Swelling (R hand swelling, bruised( purple), Finger tip is numb, tender  x2 days )            Hand Injury  This is a new problem. Episode onset: pt reports new onset of right hand swelling and bruising that developed 2 days ago. He states there is what appears to be a small bite to his right middle finger. He has been out hunting for several days and just noticed this. No drainage from hand. The problem occurs constantly. The problem has been unchanged. Associated symptoms comments: He is concerned mostly about the bruising to his hand, thinks this may be a clot of some sort. He is anticoagulated due to his hx of afib. He has tried nothing for the symptoms.       Review of Systems   Musculoskeletal:        Right hand swelling and bruising   All other systems reviewed and are negative.         Past Medical History:   Diagnosis Date   • Daytime sleepiness    • High cholesterol    • Hyperlipidemia    • Hypertension    • Obesity    • Paroxysmal atrial fibrillation (HCC)    • Snoring    • Unspecified cataract       Past Surgical History:   Procedure Laterality Date   • CATARACT PHACO WITH IOL  2015    Performed by Jakob Garcia M.D. at SURGERY [facility] ORS   • CATARACT PHACO WITH IOL  2015    Performed by Jakob Garcia M.D. at SURGERY SAME DAY [facility] ORS   • DUPUYTREN CONTRACTURE RELEASE  2012    Performed by RAFAEL CALVILLO at SURGERY [facility] ORS   • HAND SURGERY     • TONSILLECTOMY        Social History     Socioeconomic History   • Marital status:      Spouse name: Not on file   • Number of children: Not on file   • Years of education: Not on file   • Highest education level: Not on file   Occupational History   • Not on file   Tobacco Use   • Smoking status: Former Smoker     Packs/day: 1.00     Years: 5.00     Pack years: 5.00     Types: Cigarettes     Quit date: 1984     Years since quittin.4   •  "Smokeless tobacco: Former User     Types: Chew     Quit date: 7/17/1984   Vaping Use   • Vaping Use: Never used   Substance and Sexual Activity   • Alcohol use: Yes     Comment: Occasionally   • Drug use: No   • Sexual activity: Not on file   Other Topics Concern   • Not on file   Social History Narrative   • Not on file     Social Determinants of Health     Financial Resource Strain:    • Difficulty of Paying Living Expenses: Not on file   Food Insecurity:    • Worried About Running Out of Food in the Last Year: Not on file   • Ran Out of Food in the Last Year: Not on file   Transportation Needs:    • Lack of Transportation (Medical): Not on file   • Lack of Transportation (Non-Medical): Not on file   Physical Activity:    • Days of Exercise per Week: Not on file   • Minutes of Exercise per Session: Not on file   Stress:    • Feeling of Stress : Not on file   Social Connections:    • Frequency of Communication with Friends and Family: Not on file   • Frequency of Social Gatherings with Friends and Family: Not on file   • Attends Synagogue Services: Not on file   • Active Member of Clubs or Organizations: Not on file   • Attends Club or Organization Meetings: Not on file   • Marital Status: Not on file   Intimate Partner Violence:    • Fear of Current or Ex-Partner: Not on file   • Emotionally Abused: Not on file   • Physically Abused: Not on file   • Sexually Abused: Not on file   Housing Stability:    • Unable to Pay for Housing in the Last Year: Not on file   • Number of Places Lived in the Last Year: Not on file   • Unstable Housing in the Last Year: Not on file         Objective     /74 (BP Location: Left arm, Patient Position: Sitting, BP Cuff Size: Large adult)   Pulse 68   Temp 36.1 °C (97 °F) (Temporal)   Resp 18   Ht 1.778 m (5' 10\")   Wt 125 kg (275 lb)   SpO2 95%   BMI 39.46 kg/m²      Physical Exam  Vitals and nursing note reviewed.   Constitutional:       Appearance: Normal appearance. "   HENT:      Head: Normocephalic and atraumatic.      Nose: Nose normal.      Mouth/Throat:      Mouth: Mucous membranes are moist.      Pharynx: Oropharynx is clear.   Eyes:      Extraocular Movements: Extraocular movements intact.      Pupils: Pupils are equal, round, and reactive to light.   Cardiovascular:      Rate and Rhythm: Normal rate and regular rhythm.   Pulmonary:      Effort: Pulmonary effort is normal.   Musculoskeletal:         General: Normal range of motion.        Hands:       Cervical back: Normal range of motion and neck supple.   Skin:     General: Skin is warm and dry.      Capillary Refill: Capillary refill takes less than 2 seconds.   Neurological:      General: No focal deficit present.      Mental Status: He is alert and oriented to person, place, and time. Mental status is at baseline.   Psychiatric:         Mood and Affect: Mood normal.         Thought Content: Thought content normal.         Judgment: Judgment normal.                             Assessment & Plan        1. Chronic anticoagulation    2. Swelling of right hand  - US-EXTREMITY NON VASCULAR UNILATERAL RIGHT; Future    Discussed with patient whether it is a bug bite or a puncture injury to his finger, it does not appear infected and is healing. However, this is the cause of all the bruising and fluid accumulation under his skin on the top of his hand  He bled like this because of his anticoagulation status and I explained to him this is very normal  I do not suspect a clot  For reassurance, I have ordered an USS and provided pt with the order to schedule if he remains concerned  He understands and agrees  Supportive care, differential diagnoses, and indications for immediate follow-up discussed with patient.    Pathogenesis of diagnosis discussed including typical length and natural progression.      Instructed to return to  or nearest emergency department if symptoms fail to improve, for any change in condition, further  concerns, or new concerning symptoms.  Patient states understanding of the plan of care and discharge instructions.

## 2022-01-21 ENCOUNTER — APPOINTMENT (OUTPATIENT)
Dept: SLEEP MEDICINE | Facility: MEDICAL CENTER | Age: 67
End: 2022-01-21
Payer: COMMERCIAL

## 2022-05-03 PROCEDURE — RXMED WILLOW AMBULATORY MEDICATION CHARGE: Performed by: INTERNAL MEDICINE

## 2022-05-04 ENCOUNTER — PHARMACY VISIT (OUTPATIENT)
Dept: PHARMACY | Facility: MEDICAL CENTER | Age: 67
End: 2022-05-04
Payer: COMMERCIAL

## 2022-05-16 ENCOUNTER — PHARMACY VISIT (OUTPATIENT)
Dept: PHARMACY | Facility: MEDICAL CENTER | Age: 67
End: 2022-05-16
Payer: COMMERCIAL

## 2022-05-16 PROCEDURE — RXMED WILLOW AMBULATORY MEDICATION CHARGE: Performed by: FAMILY MEDICINE

## 2022-06-01 PROCEDURE — RXMED WILLOW AMBULATORY MEDICATION CHARGE: Performed by: INTERNAL MEDICINE

## 2022-06-03 ENCOUNTER — PHARMACY VISIT (OUTPATIENT)
Dept: PHARMACY | Facility: MEDICAL CENTER | Age: 67
End: 2022-06-03
Payer: COMMERCIAL

## 2022-06-06 NOTE — PROGRESS NOTES
Subjective:   Chief Complaint:   Chief Complaint   Patient presents with   • Atrial Fibrillation     F/V Dx: PAF (paroxysmal atrial fibrillation) (HCC)   • Hyperlipidemia     Michael Villarreal is a 67 y.o. male who returns today for symp PAF, chronic anticoagulation, hypertension, HLP, statin intolerance, FORREST, IFG, remote smoker.    He is on doing propafenone only TID and meto BID.   We tried flecainide twice daily but this was not effective, in fact he thought he had more afib.  Breakthrough afib every month, up to 3 hours (reviewed Kardia mobile strips in the past), feels heart racing, lightheaded, SOB, . Prior to meds, afib was over 160.  He takes extra propafenone when this happens and it works.  He had been drinking heavily the night before.   He does not drink as often.  Started walking again, previously limited by knee injury, plantar fasciitis.    TSH ok 9-2020.    Chads 2 vascular score is 2, not clear, borderline HTN, has IFG.  Remains on apixaban, no bleeding.    Has FORREST now, on CPAP, having trouble with mask because of tummy sleeper position.    Echocardiogram 2019 with normal size left atrium, normal EF, no significant abnormalities.    Had a PET nuclear stress test in 2018 which showed normal perfusion.  Remote Holter 2015 with brief runs of paroxysmal atrial tachycardia but no A. fib, some PVCs and PACs.    Probably HTN, on metoprolol, controlled.    Has HLP, pravastatin 40 mg.  Had myalgias on statin.  Had been on  vascepa with  Coupon but not indicated.  Has not had pancreatitis or triglycerides greater than 500, so I stopped fenofibrate, doing ok.  , LDL 85, HDL low.    No family history of premature coronary artery disease.  Former smoker, remote.  No history of autoimmune disease such as lupus or rheumatoid arthritis.  No chronic kidney disease.    Again, limited by knee/foot pain/sciatica.  Has mild BARROSO and now some LE edema.  He is not limited by chest pain, pressure or tightness  with activity.   No orthopnea.   No presyncope/syncope.   No symptoms of leg claudication.   No stroke/TIA like symptoms.    His father still alive.  Mother  of CHF at 87.    .    DATA REVIEWED by me:  ECG 12-10-21  Sinus, 57, NS IVCD    ECG 19  Sinus, 64, NS IVCD, baseline wandering.    ECG 10/24/2014  Sinus with frequent PACs, delayed R wave progression, supraventricular bigeminy, left axis deviation, nonspecific diffuse repolarization abnormality    Holter monitor   No A. Fib, rare PVCs, rare PACs, short runs of paroxysmal atrial tachycardia    Echo 19  Normal left ventricular chamber size.  Left ventricular ejection fraction is visually estimated to be 70%.  Normal diastolic function.  No significant valve abnormalities.   Normal inferior vena cava size and inspiratory collapse.    Echo 2018  Normal left ventricular chamber size.  Left ventricular ejection fraction is visually estimated to be 70%.  Normal diastolic function.  No significant valve abnormalities.   Normal inferior vena cava size and inspiratory collapse.  Normal-sized left atrium    Echocardiogram, 2015:  Normal left ventricular systolic function.  Left ventricular ejection fraction is visually estimated to be 70%.  Mild mitral regurgitation.  Right ventricular systolic pressure is estimated to be 35 mmHg     PET myocardial perfusion imaging, 2018:  ELECTROCARDIOGRAPHIC FINDINGS:  Show a normal sinus rhythm with no ischemic ST segment changes at rest or peak stress.  SCINTOGRAPHIC FINDINGS:  Show normal homogeneous tracer uptake at rest and peak stress.  GATED WALL MOTION FINDINGS:  Show normal LV systolic function with a resting ejection fraction of 62%, which increased to 72% at peak stress.  CONCLUSIONS AND IMPRESSION:  Normal stress test    Treadmill stress test 10/23/2015  Baseline ECG: normal sinus rhythm, rate 60. No ischemic changes.  Exercise ECG: Good exercise capacity, achieving an  adequate  cardiac workload; 9 minutes 15 seconds of a standard Surendra  protocol, 10.6 METS, 85% of Maximum Age Predicted Heart Rate  (MAPHR).   No arrhythmias.  Normal blood pressure response to exercise.   There is no clinical or electrocardiographic evidence of  ischemia.  Rodriges score places the patient at low risk.    Most recent labs:     Lab Results   Component Value Date/Time    HEMOGLOBIN 16.3 09/25/2020 06:09 AM    HEMATOCRIT 46.6 09/25/2020 06:09 AM    MCV 89.6 09/25/2020 06:09 AM    INR 0.95 10/28/2014 08:27 PM      Lab Results   Component Value Date/Time    SODIUM 144 08/24/2021 08:43 AM    POTASSIUM 4.1 08/24/2021 08:43 AM    CHLORIDE 107 08/24/2021 08:43 AM    CO2 26 08/24/2021 08:43 AM    GLUCOSE 100 (H) 08/24/2021 08:43 AM    BUN 11 08/24/2021 08:43 AM    CREATININE 0.79 08/24/2021 08:43 AM      Lab Results   Component Value Date/Time    ASTSGOT 24 08/24/2021 08:43 AM    ALTSGPT 29 08/24/2021 08:43 AM    ALBUMIN 4.3 08/24/2021 08:43 AM      Lab Results   Component Value Date/Time    CHOLSTRLTOT 161 08/24/2021 08:43 AM    CHOLSTRLTOT 164 08/24/2021 08:43 AM    LDL 85 08/24/2021 08:43 AM    LDL 89 08/24/2021 08:43 AM    HDL 33 (A) 08/24/2021 08:43 AM    HDL 33 (A) 08/24/2021 08:43 AM    TRIGLYCERIDE 217 (H) 08/24/2021 08:43 AM    TRIGLYCERIDE 210 (H) 08/24/2021 08:43 AM       10/7/2019 sodium 145 5, potassium 4, creatinine 0.85, hemoglobin A1c 5.7, TSH 1.15  9/19/2019 HDL low at 37, LDL 78, triglycerides 116, total cholesterol 138  7/18/2019 LFTs normal  10/13/2017 hemoglobin 16.1, platelets 179    Past Medical History:   Diagnosis Date   • Daytime sleepiness    • High cholesterol    • Hyperlipidemia    • Hypertension    • Obesity    • Paroxysmal atrial fibrillation (HCC)    • Snoring    • Unspecified cataract      Past Surgical History:   Procedure Laterality Date   • CATARACT PHACO WITH IOL  4/20/2015    Performed by Jakob Garcia M.D. at [facility]   • CATARACT PHACO WITH IOL  4/1/2015     Performed by Jakob Garcia M.D. at SURGERY SAME DAY [facility]   • DUPUYTREN CONTRACTURE RELEASE  2012    Performed by RAFAEL CALVILLO at SURGERY [facility]   • HAND SURGERY     • TONSILLECTOMY       Family History   Problem Relation Age of Onset   • Other Mother 68        kidney CA   • Heart Disease Mother         CHF at 86     Social History     Socioeconomic History   • Marital status:      Spouse name: Not on file   • Number of children: Not on file   • Years of education: Not on file   • Highest education level: Not on file   Occupational History   • Not on file   Tobacco Use   • Smoking status: Former Smoker     Packs/day: 1.00     Years: 5.00     Pack years: 5.00     Types: Cigarettes     Quit date: 1984     Years since quittin.9   • Smokeless tobacco: Former User     Types: Chew     Quit date: 1984   Vaping Use   • Vaping Use: Never used   Substance and Sexual Activity   • Alcohol use: Yes     Comment: rarely   • Drug use: No   • Sexual activity: Not on file   Other Topics Concern   • Not on file   Social History Narrative   • Not on file     Social Determinants of Health     Financial Resource Strain: Not on file   Food Insecurity: Not on file   Transportation Needs: Not on file   Physical Activity: Not on file   Stress: Not on file   Social Connections: Not on file   Intimate Partner Violence: Not on file   Housing Stability: Not on file     Allergies   Allergen Reactions   • Lipitor [Atorvastatin]      Myalgias       Current Outpatient Medications   Medication Sig Dispense Refill   • apixaban (ELIQUIS) 5mg Tab TAKE ONE TABLET BY MOUTH TWICE A  Tablet 7   • metoprolol tartrate (LOPRESSOR) 25 MG Tab Take 1 Tablet by mouth 2 times a day. 180 Tablet 7   • propafenone (RYTHMOL) 150 MG Tab Take 1 Tablet by mouth every 8 hours. 270 Tablet 7   • pravastatin (PRAVACHOL) 40 MG tablet Take 1 Tablet by mouth every day. 90 Tablet 7   • potassium chloride SA (KDUR) 20 MEQ  "Tab CR Take 1 Tablet by mouth every day. 90 Tablet 7   • metFORMIN ER (GLUCOPHAGE XR) 500 MG TABLET SR 24 HR Take 1 Tablet by mouth every evening with dinner. 90 Tablet 0   • levothyroxine (SYNTHROID) 150 MCG Tab TAKE ONE TABLET BY MOUTH EVERY MORNING ON AN EMPTY STOMACH 90 Tablet 0   • MAGNESIUM PO Take  by mouth every day.     • vitamin e (VITAMIN E) 400 UNIT Cap Take 400 Units by mouth every day.     • Coenzyme Q10 (CO Q 10) 100 MG CAPS Take 1 Tab by mouth every day.     • B Complex-C (SUPER B COMPLEX PO) Take 1 Tab by mouth every day.     • vitamin D (CHOLECALCIFEROL) 1000 UNIT TABS Take 1,000 Units by mouth every day.       No current facility-administered medications for this visit.       ROS    All others systems reviewed and negative.     Objective:     BP (!) 116/90 (BP Location: Left arm, Patient Position: Sitting, BP Cuff Size: Large adult)   Pulse 64   Resp 16   Ht 1.778 m (5' 10\")   Wt (!) 128 kg (283 lb)   SpO2 95%  Body mass index is 40.61 kg/m². /80    General: No acute distress. Well nourished.  HEENT: EOM grossly intact, no scleral icterus, no pharyngeal erythema.   Neck:  No JVD, no bruits, trachea midline  CVS: RRR. Normal S1, S2. No M/R/G. No LE edema.  2+ radial pulses, 2+ PT pulses  Resp: CTAB. No wheezing or crackles/rhonchi. Normal respiratory effort.  Abdomen: Soft, NT, no fatimah hepatomegaly.  MSK/Ext: No clubbing or cyanosis.  Skin: Warm and dry, no rashes.  Neurological: CN III-XII grossly intact. No focal deficits.   Psych: A&O x 3, appropriate affect, good judgement    Physical exam performed today and unchanged, except what is noted, compared to 12-1-21      Assessment:     1. PAF (paroxysmal atrial fibrillation) (HCC)  apixaban (ELIQUIS) 5mg Tab    REFERRAL TO CARDIOLOGY    EC-ECHOCARDIOGRAM COMPLETE W/O CONT   2. Chronic anticoagulation     3. Essential hypertension     4. Other chest pain     5. Mixed hyperlipidemia     6. FORREST (obstructive sleep apnea)     7. Lower " extremity edema     8. Former tobacco use     9. PVC (premature ventricular contraction)     10. FORREST on CPAP         Medical Decision Making:  Today's Assessment / Status / Plan:     -Symptomatic PAF breakthroughs about once a month, I think he should see EP to discuss ablation to get off of some of these meds  -Will need an updated echocardiogram, ordered today  -Cont Propafenone TID with metoprolol  -I also mentioned dramatic weight loss, would be a candidate for gastric sleeve  -Working on getting a good CPAP for his mild FORREST  -Diastolic blood pressure elevated today  -Triglycerides doing okay off of the fenofibrate so continue pravastatin alone  -AAA screening when he is ready  -Propafenone is a high risk medication requiring annual ecg, annual electrolytes and sometimes treadmill stress test.  -NS IVCD not significantly changed, could consider treadmill in future to assess further if concerns  -We will get blood work with his PCP soon  -RTC 6 months    Written instructions given today:  -I am referring you to EP/electrophysiology cardiology, one of my partners, to discuss ablation.  A successful ablation means you could potentially get off the Propafenone.    -I would still recommend seeing a general cardiologist to follow-up on blood pressure and cholesterol and if the EP ablation is successful then he would not need to follow routinely with the EP group    -You are entitled to a free screening of the abdominal aorta with ultrasound to look for an aneurysm.  This can be ordered by us or your primary care.    -I think he would be a candidate for gastric sleeve if you wanted to consider this.  We prefer [facility].  They have 2 surgeons over there who could see you in consultation to discuss.  Just let us know and we can send a referral.    -Updated echocardiogram    Return in about 6 months (around 12/9/2022).    It is my pleasure to participate in the care of Mr. Villarreal.  Please do not hesitate to contact  me with questions or concerns.    Jane Mcgill MD, [facility]  Cardiologist [facility]    Please note that this dictation was created using voice recognition software. I have made every reasonable attempt to correct obvious errors, but it is possible there are errors of grammar and possibly content that I did not discover before finalizing the note.

## 2022-06-09 ENCOUNTER — OFFICE VISIT (OUTPATIENT)
Dept: CARDIOLOGY | Facility: MEDICAL CENTER | Age: 67
End: 2022-06-09
Payer: COMMERCIAL

## 2022-06-09 VITALS
RESPIRATION RATE: 16 BRPM | OXYGEN SATURATION: 95 % | HEART RATE: 64 BPM | BODY MASS INDEX: 40.52 KG/M2 | SYSTOLIC BLOOD PRESSURE: 116 MMHG | DIASTOLIC BLOOD PRESSURE: 90 MMHG | HEIGHT: 70 IN | WEIGHT: 283 LBS

## 2022-06-09 DIAGNOSIS — G47.33 OSA ON CPAP: ICD-10-CM

## 2022-06-09 DIAGNOSIS — E78.2 MIXED HYPERLIPIDEMIA: ICD-10-CM

## 2022-06-09 DIAGNOSIS — R07.89 OTHER CHEST PAIN: ICD-10-CM

## 2022-06-09 DIAGNOSIS — Z79.01 CHRONIC ANTICOAGULATION: ICD-10-CM

## 2022-06-09 DIAGNOSIS — G47.33 OSA (OBSTRUCTIVE SLEEP APNEA): ICD-10-CM

## 2022-06-09 DIAGNOSIS — R60.0 LOWER EXTREMITY EDEMA: ICD-10-CM

## 2022-06-09 DIAGNOSIS — Z87.891 FORMER TOBACCO USE: ICD-10-CM

## 2022-06-09 DIAGNOSIS — I48.0 PAF (PAROXYSMAL ATRIAL FIBRILLATION) (HCC): ICD-10-CM

## 2022-06-09 DIAGNOSIS — I49.3 PVC (PREMATURE VENTRICULAR CONTRACTION): ICD-10-CM

## 2022-06-09 DIAGNOSIS — I10 ESSENTIAL HYPERTENSION: ICD-10-CM

## 2022-06-09 PROCEDURE — 99214 OFFICE O/P EST MOD 30 MIN: CPT | Performed by: INTERNAL MEDICINE

## 2022-06-09 RX ORDER — PROPAFENONE HYDROCHLORIDE 150 MG/1
150 TABLET, COATED ORAL EVERY 8 HOURS
Qty: 270 TABLET | Refills: 7 | Status: SHIPPED | OUTPATIENT
Start: 2022-06-09 | End: 2023-02-03

## 2022-06-09 RX ORDER — PRAVASTATIN SODIUM 40 MG
40 TABLET ORAL DAILY
Qty: 90 TABLET | Refills: 7 | Status: ON HOLD | OUTPATIENT
Start: 2022-06-09 | End: 2022-12-27

## 2022-06-09 RX ORDER — POTASSIUM CHLORIDE 20 MEQ/1
20 TABLET, EXTENDED RELEASE ORAL DAILY
Qty: 90 TABLET | Refills: 7 | Status: ON HOLD | OUTPATIENT
Start: 2022-06-09 | End: 2022-12-27

## 2022-06-09 ASSESSMENT — FIBROSIS 4 INDEX: FIB4 SCORE: 1.88

## 2022-06-09 NOTE — PATIENT INSTRUCTIONS
-I am referring you to EP/electrophysiology cardiology, one of my partners, to discuss ablation.  A successful ablation means you could potentially get off the Propafenone.    -I would still recommend seeing a general cardiologist to follow-up on blood pressure and cholesterol and if the EP ablation is successful then he would not need to follow routinely with the EP group    -You are entitled to a free screening of the abdominal aorta with ultrasound to look for an aneurysm.  This can be ordered by us or your primary care.    -Updated echocardiogram    -I think he would be a candidate for gastric sleeve if you wanted to consider this.  We prefer [facility].  They have 2 surgeons over there who could see you in consultation to discuss.  Just let us know and we can send a referral.    -Dr. Pedro Peoples, Dr. Gerard Trevino, Barbara Mustafa

## 2022-06-09 NOTE — LETTER
[facility]   [address], [address]  [place] [address]  Phone: [phone]  Fax: [phone]              Michael Villarreal  1955    Encounter Date: 6/9/2022    Jane Mcgill M.D.          PROGRESS NOTE:  Subjective:   Chief Complaint:   Chief Complaint   Patient presents with   • Atrial Fibrillation     F/V Dx: PAF (paroxysmal atrial fibrillation) (HCC)   • Hyperlipidemia     Michael Villarreal is a 67 y.o. male who returns today for symp PAF, chronic anticoagulation, hypertension, HLP, statin intolerance, FORREST, IFG, remote smoker.    He is on doing propafenone only TID and meto BID.   We tried flecainide twice daily but this was not effective, in fact he thought he had more afib.  Breakthrough afib every month, up to 3 hours (reviewed Kardia mobile strips in the past), feels heart racing, lightheaded, SOB, . Prior to meds, afib was over 160.  He takes extra propafenone when this happens and it works.  He had been drinking heavily the night before.   He does not drink as often.  Started walking again, previously limited by knee injury, plantar fasciitis.    TSH ok 9-2020.    Chads 2 vascular score is 2, not clear, borderline HTN, has IFG.  Remains on apixaban, no bleeding.    Has FORREST now, on CPAP, having trouble with mask because of tummy sleeper position.    Echocardiogram 2019 with normal size left atrium, normal EF, no significant abnormalities.    Had a PET nuclear stress test in 2018 which showed normal perfusion.  Remote Holter 2015 with brief runs of paroxysmal atrial tachycardia but no A. fib, some PVCs and PACs.    Probably HTN, on metoprolol, controlled.    Has HLP, pravastatin 40 mg.  Had myalgias on statin.  Had been on  vascepa with  Coupon but not indicated.  Has not had pancreatitis or triglycerides greater than 500, so I stopped fenofibrate, doing ok.  , LDL 85, HDL low.    No family history of premature coronary artery  disease.  Former smoker, remote.  No history of autoimmune disease such as lupus or rheumatoid arthritis.  No chronic kidney disease.    Again, limited by knee/foot pain/sciatica.  Has mild BARROSO and now some LE edema.  He is not limited by chest pain, pressure or tightness with activity.   No orthopnea.   No presyncope/syncope.   No symptoms of leg claudication.   No stroke/TIA like symptoms.    His father still alive.  Mother  of CHF at 87.    .    DATA REVIEWED by me:  ECG 12-10-21  Sinus, 57, NS IVCD    ECG 19  Sinus, 64, NS IVCD, baseline wandering.    ECG 10/24/2014  Sinus with frequent PACs, delayed R wave progression, supraventricular bigeminy, left axis deviation, nonspecific diffuse repolarization abnormality    Holter monitor   No A. Fib, rare PVCs, rare PACs, short runs of paroxysmal atrial tachycardia    Echo 19  Normal left ventricular chamber size.  Left ventricular ejection fraction is visually estimated to be 70%.  Normal diastolic function.  No significant valve abnormalities.   Normal inferior vena cava size and inspiratory collapse.    Echo 2018  Normal left ventricular chamber size.  Left ventricular ejection fraction is visually estimated to be 70%.  Normal diastolic function.  No significant valve abnormalities.   Normal inferior vena cava size and inspiratory collapse.  Normal-sized left atrium    Echocardiogram, 2015:  Normal left ventricular systolic function.  Left ventricular ejection fraction is visually estimated to be 70%.  Mild mitral regurgitation.  Right ventricular systolic pressure is estimated to be 35 mmHg     PET myocardial perfusion imaging, 2018:  ELECTROCARDIOGRAPHIC FINDINGS:  Show a normal sinus rhythm with no ischemic ST segment changes at rest or peak stress.  SCINTOGRAPHIC FINDINGS:  Show normal homogeneous tracer uptake at rest and peak stress.  GATED WALL MOTION FINDINGS:  Show normal LV systolic function with a resting ejection  fraction of 62%, which increased to 72% at peak stress.  CONCLUSIONS AND IMPRESSION:  Normal stress test    Treadmill stress test 10/23/2015  Baseline ECG: normal sinus rhythm, rate 60. No ischemic changes.  Exercise ECG: Good exercise capacity, achieving an adequate  cardiac workload; 9 minutes 15 seconds of a standard Surendra  protocol, 10.6 METS, 85% of Maximum Age Predicted Heart Rate  (MAPHR).   No arrhythmias.  Normal blood pressure response to exercise.   There is no clinical or electrocardiographic evidence of  ischemia.  Rodriges score places the patient at low risk.    Most recent labs:     Lab Results   Component Value Date/Time    HEMOGLOBIN 16.3 09/25/2020 06:09 AM    HEMATOCRIT 46.6 09/25/2020 06:09 AM    MCV 89.6 09/25/2020 06:09 AM    INR 0.95 10/28/2014 08:27 PM      Lab Results   Component Value Date/Time    SODIUM 144 08/24/2021 08:43 AM    POTASSIUM 4.1 08/24/2021 08:43 AM    CHLORIDE 107 08/24/2021 08:43 AM    CO2 26 08/24/2021 08:43 AM    GLUCOSE 100 (H) 08/24/2021 08:43 AM    BUN 11 08/24/2021 08:43 AM    CREATININE 0.79 08/24/2021 08:43 AM      Lab Results   Component Value Date/Time    ASTSGOT 24 08/24/2021 08:43 AM    ALTSGPT 29 08/24/2021 08:43 AM    ALBUMIN 4.3 08/24/2021 08:43 AM      Lab Results   Component Value Date/Time    CHOLSTRLTOT 161 08/24/2021 08:43 AM    CHOLSTRLTOT 164 08/24/2021 08:43 AM    LDL 85 08/24/2021 08:43 AM    LDL 89 08/24/2021 08:43 AM    HDL 33 (A) 08/24/2021 08:43 AM    HDL 33 (A) 08/24/2021 08:43 AM    TRIGLYCERIDE 217 (H) 08/24/2021 08:43 AM    TRIGLYCERIDE 210 (H) 08/24/2021 08:43 AM       10/7/2019 sodium 145 5, potassium 4, creatinine 0.85, hemoglobin A1c 5.7, TSH 1.15  9/19/2019 HDL low at 37, LDL 78, triglycerides 116, total cholesterol 138  7/18/2019 LFTs normal  10/13/2017 hemoglobin 16.1, platelets 179    Past Medical History:   Diagnosis Date   • Daytime sleepiness    • High cholesterol    • Hyperlipidemia    • Hypertension    • Obesity    • Paroxysmal  atrial fibrillation (HCC)    • Snoring    • Unspecified cataract      Past Surgical History:   Procedure Laterality Date   • CATARACT PHACO WITH IOL  2015    Performed by Jakob Garcia M.D. at SURGERY SURGICAL [facility] ORS   • CATARACT PHACO WITH IOL  2015    Performed by Jakob Garcia M.D. at SURGERY SAME DAY [facility] ORS   • DUPUYTREN CONTRACTURE RELEASE  2012    Performed by RAFAEL CALVILLO at SURGERY [facility] ORS   • HAND SURGERY     • TONSILLECTOMY       Family History   Problem Relation Age of Onset   • Other Mother 68        kidney CA   • Heart Disease Mother         CHF at 86     Social History     Socioeconomic History   • Marital status:      Spouse name: Not on file   • Number of children: Not on file   • Years of education: Not on file   • Highest education level: Not on file   Occupational History   • Not on file   Tobacco Use   • Smoking status: Former Smoker     Packs/day: 1.00     Years: 5.00     Pack years: 5.00     Types: Cigarettes     Quit date: 1984     Years since quittin.9   • Smokeless tobacco: Former User     Types: Chew     Quit date: 1984   Vaping Use   • Vaping Use: Never used   Substance and Sexual Activity   • Alcohol use: Yes     Comment: rarely   • Drug use: No   • Sexual activity: Not on file   Other Topics Concern   • Not on file   Social History Narrative   • Not on file     Social Determinants of Health     Financial Resource Strain: Not on file   Food Insecurity: Not on file   Transportation Needs: Not on file   Physical Activity: Not on file   Stress: Not on file   Social Connections: Not on file   Intimate Partner Violence: Not on file   Housing Stability: Not on file     Allergies   Allergen Reactions   • Lipitor [Atorvastatin]      Myalgias       Current Outpatient Medications   Medication Sig Dispense Refill   • apixaban (ELIQUIS) 5mg Tab TAKE ONE TABLET BY MOUTH TWICE A  Tablet 7   • metoprolol tartrate (LOPRESSOR) 25 MG Tab  "Take 1 Tablet by mouth 2 times a day. 180 Tablet 7   • propafenone (RYTHMOL) 150 MG Tab Take 1 Tablet by mouth every 8 hours. 270 Tablet 7   • pravastatin (PRAVACHOL) 40 MG tablet Take 1 Tablet by mouth every day. 90 Tablet 7   • potassium chloride SA (KDUR) 20 MEQ Tab CR Take 1 Tablet by mouth every day. 90 Tablet 7   • metFORMIN ER (GLUCOPHAGE XR) 500 MG TABLET SR 24 HR Take 1 Tablet by mouth every evening with dinner. 90 Tablet 0   • levothyroxine (SYNTHROID) 150 MCG Tab TAKE ONE TABLET BY MOUTH EVERY MORNING ON AN EMPTY STOMACH 90 Tablet 0   • MAGNESIUM PO Take  by mouth every day.     • vitamin e (VITAMIN E) 400 UNIT Cap Take 400 Units by mouth every day.     • Coenzyme Q10 (CO Q 10) 100 MG CAPS Take 1 Tab by mouth every day.     • B Complex-C (SUPER B COMPLEX PO) Take 1 Tab by mouth every day.     • vitamin D (CHOLECALCIFEROL) 1000 UNIT TABS Take 1,000 Units by mouth every day.       No current facility-administered medications for this visit.       ROS    All others systems reviewed and negative.     Objective:     BP (!) 116/90 (BP Location: Left arm, Patient Position: Sitting, BP Cuff Size: Large adult)   Pulse 64   Resp 16   Ht 1.778 m (5' 10\")   Wt (!) 128 kg (283 lb)   SpO2 95%  Body mass index is 40.61 kg/m². /80    General: No acute distress. Well nourished.  HEENT: EOM grossly intact, no scleral icterus, no pharyngeal erythema.   Neck:  No JVD, no bruits, trachea midline  CVS: RRR. Normal S1, S2. No M/R/G. No LE edema.  2+ radial pulses, 2+ PT pulses  Resp: CTAB. No wheezing or crackles/rhonchi. Normal respiratory effort.  Abdomen: Soft, NT, no fatimah hepatomegaly.  MSK/Ext: No clubbing or cyanosis.  Skin: Warm and dry, no rashes.  Neurological: CN III-XII grossly intact. No focal deficits.   Psych: A&O x 3, appropriate affect, good judgement    Physical exam performed today and unchanged, except what is noted, compared to 12-1-21      Assessment:     1. PAF (paroxysmal atrial fibrillation) " (HCC)  apixaban (ELIQUIS) 5mg Tab    REFERRAL TO CARDIOLOGY    EC-ECHOCARDIOGRAM COMPLETE W/O CONT   2. Chronic anticoagulation     3. Essential hypertension     4. Other chest pain     5. Mixed hyperlipidemia     6. FORREST (obstructive sleep apnea)     7. Lower extremity edema     8. Former tobacco use     9. PVC (premature ventricular contraction)     10. FORREST on CPAP         Medical Decision Making:  Today's Assessment / Status / Plan:     -Symptomatic PAF breakthroughs about once a month, I think he should see EP to discuss ablation to get off of some of these meds  -Will need an updated echocardiogram, ordered today  -Cont Propafenone TID with metoprolol  -I also mentioned dramatic weight loss, would be a candidate for gastric sleeve  -Working on getting a good CPAP for his mild FORREST  -Diastolic blood pressure elevated today  -Triglycerides doing okay off of the fenofibrate so continue pravastatin alone  -AAA screening when he is ready  -Propafenone is a high risk medication requiring annual ecg, annual electrolytes and sometimes treadmill stress test.  -NS IVCD not significantly changed, could consider treadmill in future to assess further if concerns  -We will get blood work with his PCP soon  -RTC 6 months    Written instructions given today:  -I am referring you to EP/electrophysiology cardiology, one of my partners, to discuss ablation.  A successful ablation means you could potentially get off the Propafenone.    -I would still recommend seeing a general cardiologist to follow-up on blood pressure and cholesterol and if the EP ablation is successful then he would not need to follow routinely with the EP group    -You are entitled to a free screening of the abdominal aorta with ultrasound to look for an aneurysm.  This can be ordered by us or your primary care.    -I think he would be a candidate for gastric sleeve if you wanted to consider this.  We prefer [facility].  They have 2 surgeons over there who  could see you in consultation to discuss.  Just let us know and we can send a referral.    -Updated echocardiogram    Return in about 6 months (around 12/9/2022).    It is my pleasure to participate in the care of Mr. Villarreal.  Please do not hesitate to contact me with questions or concerns.    Jane Mcgill MD, [facility]  Cardiologist [facility]    Please note that this dictation was created using voice recognition software. I have made every reasonable attempt to correct obvious errors, but it is possible there are errors of grammar and possibly content that I did not discover before finalizing the note.          No Recipients

## 2022-06-11 ENCOUNTER — PATIENT MESSAGE (OUTPATIENT)
Dept: CARDIOLOGY | Facility: MEDICAL CENTER | Age: 67
End: 2022-06-11
Payer: COMMERCIAL

## 2022-06-11 DIAGNOSIS — E66.09 NON MORBID OBESITY DUE TO EXCESS CALORIES: ICD-10-CM

## 2022-06-13 NOTE — PROGRESS NOTES
Please make the referral to [facility] for gastric bypass.    Let him know that there are some hoops to jump through for gastric bypass.  I think it is worth waiting to see how he does with a gastric bypass.    No rush to either procedure.  Thank you.

## 2022-07-03 PROCEDURE — RXMED WILLOW AMBULATORY MEDICATION CHARGE: Performed by: FAMILY MEDICINE

## 2022-07-05 ENCOUNTER — PHARMACY VISIT (OUTPATIENT)
Dept: PHARMACY | Facility: MEDICAL CENTER | Age: 67
End: 2022-07-05
Payer: COMMERCIAL

## 2022-07-15 ENCOUNTER — OFFICE VISIT (OUTPATIENT)
Dept: CARDIOLOGY | Facility: MEDICAL CENTER | Age: 67
End: 2022-07-15
Attending: INTERNAL MEDICINE
Payer: COMMERCIAL

## 2022-07-15 VITALS
HEART RATE: 61 BPM | RESPIRATION RATE: 14 BRPM | HEIGHT: 68 IN | WEIGHT: 287 LBS | SYSTOLIC BLOOD PRESSURE: 132 MMHG | OXYGEN SATURATION: 93 % | BODY MASS INDEX: 43.5 KG/M2 | DIASTOLIC BLOOD PRESSURE: 84 MMHG

## 2022-07-15 DIAGNOSIS — I48.0 PAF (PAROXYSMAL ATRIAL FIBRILLATION) (HCC): ICD-10-CM

## 2022-07-15 PROCEDURE — 93000 ELECTROCARDIOGRAM COMPLETE: CPT | Performed by: INTERNAL MEDICINE

## 2022-07-15 PROCEDURE — 99205 OFFICE O/P NEW HI 60 MIN: CPT | Mod: 25 | Performed by: INTERNAL MEDICINE

## 2022-07-15 RX ORDER — VIT C/B6/B5/MAGNESIUM/HERB 173 50-5-6-5MG
CAPSULE ORAL
Status: ON HOLD | COMMUNITY
End: 2022-12-27

## 2022-07-15 ASSESSMENT — FIBROSIS 4 INDEX: FIB4 SCORE: 1.88

## 2022-07-15 NOTE — PROGRESS NOTES
Arrhythmia Clinic Note (New patient)     DOS: 7/15/2022    Referring physician: Dr Mcgill    Chief complaint/Reason for consult: Afib    HPI: 66 y/o M with h/o pAF. On propafenone. Still getting pAF lasting 3 hrs at a time, every 2 weeks. Has tightness in his jaw. Also planning on Gastric bypass surgery. Referred to discuss ablation.    ROS (+ highlighted in bold):  Constitutional: Fevers/chills/fatigue/weightloss  HEENT: Blurry vision/eye pain/sore throat/hearing loss  Respiratory: Shortness of breath/cough  Cardiovascular: Chest pain/palpitations/edema/orthopnea/syncope  GI: Nausea/vomitting/diarrhea  MSK: Arthralgias/myagias/muscle weakness  Skin: Rash/sores  Neurological: Numbness/tremors/vertigo  Endocrine: Excessive thirst/polyuria/cold intolerance/heat intolerance  Psych: Depression/anxiety    Past Medical History:   Diagnosis Date   • Daytime sleepiness    • High cholesterol    • Hyperlipidemia    • Hypertension    • Obesity    • Paroxysmal atrial fibrillation (HCC)    • Snoring    • Unspecified cataract        Past Surgical History:   Procedure Laterality Date   • CATARACT PHACO WITH IOL  2015    Performed by Jakob Garcia M.D. at SURGERY [facility] ORS   • CATARACT PHACO WITH IOL  2015    Performed by Jakob Garcia M.D. at SURGERY SAME [facility] ORS   • DUPUYTREN CONTRACTURE RELEASE  2012    Performed by RAFAEL CALVILLO at SURGERY [facility] ORS   • HAND SURGERY     • TONSILLECTOMY         Social History     Socioeconomic History   • Marital status:      Spouse name: Not on file   • Number of children: Not on file   • Years of education: Not on file   • Highest education level: Not on file   Occupational History   • Not on file   Tobacco Use   • Smoking status: Former Smoker     Packs/day: 1.00     Years: 5.00     Pack years: 5.00     Types: Cigarettes     Quit date: 1984     Years since quittin.0   • Smokeless tobacco: Former User     Types: Chew     Quit date:  7/17/1984   Vaping Use   • Vaping Use: Never used   Substance and Sexual Activity   • Alcohol use: Yes     Comment: rarely   • Drug use: No   • Sexual activity: Not on file   Other Topics Concern   • Not on file   Social History Narrative   • Not on file     Social Determinants of Health     Financial Resource Strain: Not on file   Food Insecurity: Not on file   Transportation Needs: Not on file   Physical Activity: Not on file   Stress: Not on file   Social Connections: Not on file   Intimate Partner Violence: Not on file   Housing Stability: Not on file       Family History   Problem Relation Age of Onset   • Other Mother 68        kidney CA   • Heart Disease Mother         CHF at 86       Allergies   Allergen Reactions   • Lipitor [Atorvastatin]      Myalgias       Current Outpatient Medications   Medication Sig Dispense Refill   • Turmeric 500 MG Cap Take  by mouth.     • apixaban (ELIQUIS) 5mg Tab TAKE ONE TABLET BY MOUTH TWICE A  Tablet 7   • metoprolol tartrate (LOPRESSOR) 25 MG Tab Take 1 Tablet by mouth 2 times a day. 180 Tablet 7   • propafenone (RYTHMOL) 150 MG Tab Take 1 Tablet by mouth every 8 hours. 270 Tablet 7   • pravastatin (PRAVACHOL) 40 MG tablet Take 1 Tablet by mouth every day. 90 Tablet 7   • potassium chloride SA (KDUR) 20 MEQ Tab CR Take 1 Tablet by mouth every day. 90 Tablet 7   • metFORMIN ER (GLUCOPHAGE XR) 500 MG TABLET SR 24 HR Take 1 Tablet by mouth every evening with dinner. 90 Tablet 0   • levothyroxine (SYNTHROID) 150 MCG Tab TAKE ONE TABLET BY MOUTH EVERY MORNING ON AN EMPTY STOMACH 90 Tablet 0   • MAGNESIUM PO Take  by mouth every day.     • vitamin e (VITAMIN E) 400 UNIT Cap Take 400 Units by mouth every day.     • Coenzyme Q10 (CO Q 10) 100 MG CAPS Take 1 Tab by mouth every day.     • B Complex-C (SUPER B COMPLEX PO) Take 1 Tab by mouth every day.     • vitamin D (CHOLECALCIFEROL) 1000 UNIT TABS Take 1,000 Units by mouth every day.       No current facility-administered  "medications for this visit.       Physical Exam:  Vitals:    07/15/22 0808   BP: 132/84   BP Location: Left arm   Patient Position: Sitting   BP Cuff Size: Adult   Pulse: 61   Resp: 14   SpO2: 93%   Weight: (!) 130 kg (287 lb)   Height: 1.727 m (5' 8\")     General appearance: NAD, conversant   Eyes: anicteric sclerae, moist conjunctivae; no lid-lag; PERRLA  HENT: Atraumatic; oropharynx clear with moist mucous membranes and no mucosal ulcerations; normal hard and soft palate  Neck: Trachea midline; FROM, supple, no thyromegaly or lymphadenopathy  Lungs: CTA, with normal respiratory effort and no intercostal retractions  CV: RRR, no MRGs, no JVD   Abdomen: Soft, non-tender; no masses or HSM  Extremities: No peripheral edema or extremity lymphadenopathy  Skin: Normal temperature, turgor and texture; no rash, ulcers or subcutaneous nodules  Psych: Appropriate affect, alert and oriented to person, place and time    Data:  Lipids:   Lab Results   Component Value Date/Time    CHOLSTRLTOT 161 08/24/2021 08:43 AM    CHOLSTRLTOT 164 08/24/2021 08:43 AM    TRIGLYCERIDE 217 (H) 08/24/2021 08:43 AM    TRIGLYCERIDE 210 (H) 08/24/2021 08:43 AM    HDL 33 (A) 08/24/2021 08:43 AM    HDL 33 (A) 08/24/2021 08:43 AM    LDL 85 08/24/2021 08:43 AM    LDL 89 08/24/2021 08:43 AM        BMP:  Lab Results   Component Value Date/Time    SODIUM 144 08/24/2021 0843    POTASSIUM 4.1 08/24/2021 0843    CHLORIDE 107 08/24/2021 0843    CO2 26 08/24/2021 0843    GLUCOSE 100 (H) 08/24/2021 0843    BUN 11 08/24/2021 0843    CREATININE 0.79 08/24/2021 0843    CALCIUM 9.4 08/24/2021 0843    ANION 11.0 08/24/2021 0843        TSH:   Lab Results   Component Value Date/Time    TSHULTRASEN 2.000 09/25/2020 0609        THYROXINE (T4):   No results found for: HAMZAHIR     CBC:   Lab Results   Component Value Date/Time    WBC 6.5 09/25/2020 06:09 AM    RBC 5.20 09/25/2020 06:09 AM    HEMOGLOBIN 16.3 09/25/2020 06:09 AM    HEMATOCRIT 46.6 09/25/2020 06:09 AM    " MCV 89.6 09/25/2020 06:09 AM    MCH 31.3 09/25/2020 06:09 AM    MCHC 35.0 09/25/2020 06:09 AM    RDW 41.9 09/25/2020 06:09 AM    PLATELETCT 159 (L) 09/25/2020 06:09 AM    MPV 13.2 (H) 09/25/2020 06:09 AM    NEUTSPOLYS 50.00 09/25/2020 06:09 AM    LYMPHOCYTES 34.80 09/25/2020 06:09 AM    MONOCYTES 8.20 09/25/2020 06:09 AM    EOSINOPHILS 2.90 09/25/2020 06:09 AM    BASOPHILS 1.90 (H) 09/25/2020 06:09 AM    IMMGRAN 2.20 (H) 09/25/2020 06:09 AM    NRBC 0.00 09/25/2020 06:09 AM    NEUTS 3.24 09/25/2020 06:09 AM    LYMPHS 2.25 09/25/2020 06:09 AM    MONOS 0.53 09/25/2020 06:09 AM    EOS 0.19 09/25/2020 06:09 AM    BASO 0.12 09/25/2020 06:09 AM    IMMGRANAB 0.14 (H) 09/25/2020 06:09 AM    NRBCAB 0.00 09/25/2020 06:09 AM        CBC w/o DIFF  Lab Results   Component Value Date/Time    WBC 6.5 09/25/2020 06:09 AM    RBC 5.20 09/25/2020 06:09 AM    HEMOGLOBIN 16.3 09/25/2020 06:09 AM    MCV 89.6 09/25/2020 06:09 AM    MCH 31.3 09/25/2020 06:09 AM    MCHC 35.0 09/25/2020 06:09 AM    RDW 41.9 09/25/2020 06:09 AM    MPV 13.2 (H) 09/25/2020 06:09 AM       Prior echo/stress results reviewed: Normal EF    EKG interpreted by me: NSR    Impression/Plan:  1. PAF (paroxysmal atrial fibrillation) (HCC)  EKG     1. pAF  2. High risk medication use  3. Anticoagulation use  4. Obesity    - ECG reviewed, continue propafenone, echo ordered  - If normal echo, low risk to proceed with gastric sleeve surgery  - He would like to consider ablation if weight loss does not resolve the afib. He will follow up after surgery.    A total of 62 minutes of time was spent on day of encounter reviewing medical record, performing history and examination, counseling, ordering medication/test/consults, collaborating with referring service, and documentation.        Uri Parks MD  Cardiac Electrophysiology

## 2022-07-22 ENCOUNTER — PHARMACY VISIT (OUTPATIENT)
Dept: PHARMACY | Facility: MEDICAL CENTER | Age: 67
End: 2022-07-22
Payer: COMMERCIAL

## 2022-07-22 LAB — EKG IMPRESSION: NORMAL

## 2022-07-22 PROCEDURE — RXMED WILLOW AMBULATORY MEDICATION CHARGE: Performed by: INTERNAL MEDICINE

## 2022-08-29 ENCOUNTER — HOSPITAL ENCOUNTER (OUTPATIENT)
Dept: RADIOLOGY | Facility: MEDICAL CENTER | Age: 67
End: 2022-08-29
Attending: SURGERY
Payer: COMMERCIAL

## 2022-08-29 DIAGNOSIS — K21.00 GASTROESOPHAGEAL REFLUX DISEASE WITH ESOPHAGITIS, UNSPECIFIED WHETHER HEMORRHAGE: ICD-10-CM

## 2022-08-29 PROCEDURE — 74240 X-RAY XM UPR GI TRC 1CNTRST: CPT

## 2022-09-01 PROCEDURE — RXMED WILLOW AMBULATORY MEDICATION CHARGE: Performed by: INTERNAL MEDICINE

## 2022-09-01 PROCEDURE — RXMED WILLOW AMBULATORY MEDICATION CHARGE: Performed by: FAMILY MEDICINE

## 2022-09-02 ENCOUNTER — PHARMACY VISIT (OUTPATIENT)
Dept: PHARMACY | Facility: MEDICAL CENTER | Age: 67
End: 2022-09-02
Payer: COMMERCIAL

## 2022-09-12 ENCOUNTER — PHARMACY VISIT (OUTPATIENT)
Dept: PHARMACY | Facility: MEDICAL CENTER | Age: 67
End: 2022-09-12
Payer: COMMERCIAL

## 2022-09-15 ENCOUNTER — HOSPITAL ENCOUNTER (OUTPATIENT)
Dept: CARDIOLOGY | Facility: MEDICAL CENTER | Age: 67
End: 2022-09-15
Attending: INTERNAL MEDICINE
Payer: COMMERCIAL

## 2022-09-15 DIAGNOSIS — I48.0 PAF (PAROXYSMAL ATRIAL FIBRILLATION) (HCC): ICD-10-CM

## 2022-09-15 LAB
LV EJECT FRACT  99904: 65
LV EJECT FRACT MOD 2C 99903: 65.61
LV EJECT FRACT MOD 4C 99902: 73.77
LV EJECT FRACT MOD BP 99901: 71.2

## 2022-09-15 PROCEDURE — 93306 TTE W/DOPPLER COMPLETE: CPT

## 2022-09-15 PROCEDURE — 93306 TTE W/DOPPLER COMPLETE: CPT | Mod: 26 | Performed by: INTERNAL MEDICINE

## 2022-09-29 ENCOUNTER — PATIENT MESSAGE (OUTPATIENT)
Dept: CARDIOLOGY | Facility: MEDICAL CENTER | Age: 67
End: 2022-09-29
Payer: COMMERCIAL

## 2022-09-29 NOTE — LETTER
PROCEDURE/SURGERY CLEARANCE FORM      Encounter Date: 9/29/2022    Patient: Michael Villarreal  YOB: 1955    CARDIOLOGIST:  Uri Parks M.D.    REFERRING DOCTOR:  Dr. Barrera        The above patient is low risk to proceed with gastric sleeve surgery from a cardiac standpoint.         Electronically signed  Uri Parks M.D.

## 2022-09-30 ENCOUNTER — TELEPHONE (OUTPATIENT)
Dept: SCHEDULING | Facility: IMAGING CENTER | Age: 67
End: 2022-09-30
Payer: COMMERCIAL

## 2022-10-01 NOTE — PATIENT COMMUNICATION
"Per Dr. Parks's 7/15 ov note: \"- If normal echo, low risk to proceed with gastric sleeve surgery\"    Echo report from 9/15 is normal, he can proceed per Dr. Parks.     Letter created and faxed to  Dr. Barrera at [facility] at [phone]    "

## 2022-10-03 PROCEDURE — RXMED WILLOW AMBULATORY MEDICATION CHARGE: Performed by: FAMILY MEDICINE

## 2022-10-04 ENCOUNTER — PHARMACY VISIT (OUTPATIENT)
Dept: PHARMACY | Facility: MEDICAL CENTER | Age: 67
End: 2022-10-04
Payer: COMMERCIAL

## 2022-10-13 ENCOUNTER — APPOINTMENT (RX ONLY)
Dept: URBAN - METROPOLITAN AREA CLINIC 158 | Facility: CLINIC | Age: 67
Setting detail: DERMATOLOGY
End: 2022-10-13

## 2022-10-13 DIAGNOSIS — L50.1 IDIOPATHIC URTICARIA: ICD-10-CM

## 2022-10-13 PROCEDURE — ? PRESCRIPTION

## 2022-10-13 PROCEDURE — 99203 OFFICE O/P NEW LOW 30 MIN: CPT

## 2022-10-13 PROCEDURE — ? OTHER

## 2022-10-13 PROCEDURE — ? COUNSELING

## 2022-10-13 RX ORDER — TRIAMCINOLONE ACETONIDE 1 MG/G
1 CREAM TOPICAL TWICE A DAY
Qty: 80 | Refills: 1 | Status: ERX | COMMUNITY
Start: 2022-10-13

## 2022-10-13 RX ADMIN — TRIAMCINOLONE ACETONIDE 1: 1 CREAM TOPICAL at 00:00

## 2022-10-13 ASSESSMENT — LOCATION SIMPLE DESCRIPTION DERM
LOCATION SIMPLE: LEFT UPPER ARM
LOCATION SIMPLE: RIGHT UPPER ARM
LOCATION SIMPLE: RIGHT UPPER BACK
LOCATION SIMPLE: RIGHT PRETIBIAL REGION
LOCATION SIMPLE: LEFT PRETIBIAL REGION

## 2022-10-13 ASSESSMENT — LOCATION DETAILED DESCRIPTION DERM
LOCATION DETAILED: RIGHT MEDIAL UPPER BACK
LOCATION DETAILED: RIGHT ANTERIOR DISTAL UPPER ARM
LOCATION DETAILED: RIGHT PROXIMAL PRETIBIAL REGION
LOCATION DETAILED: LEFT ANTERIOR DISTAL UPPER ARM
LOCATION DETAILED: LEFT PROXIMAL PRETIBIAL REGION

## 2022-10-13 ASSESSMENT — LOCATION ZONE DERM
LOCATION ZONE: TRUNK
LOCATION ZONE: ARM
LOCATION ZONE: LEG

## 2022-10-13 NOTE — PROCEDURE: OTHER
Other (Free Text): Urticaria develops after a hot shower or increased body temperature. Continue with Allegra daily, but increase to 4 times a day for a few weeks, then slowly taper down. He noticed after starting metformin, the hives began. Advised to follow up with pcp to discuss medications. Will stop otc cortisone 10 and start TAC twice daily for 2 weeks then as needed for itch.
Detail Level: Zone
Render Risk Assessment In Note?: no
Note Text (......Xxx Chief Complaint.): This diagnosis correlates with the

## 2022-10-14 ENCOUNTER — HOSPITAL ENCOUNTER (OUTPATIENT)
Dept: LAB | Facility: MEDICAL CENTER | Age: 67
End: 2022-10-14
Attending: FAMILY MEDICINE
Payer: COMMERCIAL

## 2022-10-14 LAB
25(OH)D3 SERPL-MCNC: 65 NG/ML (ref 30–100)
ALBUMIN SERPL BCP-MCNC: 4.5 G/DL (ref 3.2–4.9)
ALBUMIN/GLOB SERPL: 1.7 G/DL
ALP SERPL-CCNC: 62 U/L (ref 30–99)
ALT SERPL-CCNC: 22 U/L (ref 2–50)
ANION GAP SERPL CALC-SCNC: 9 MMOL/L (ref 7–16)
AST SERPL-CCNC: 21 U/L (ref 12–45)
BASOPHILS # BLD AUTO: 2.1 % (ref 0–1.8)
BASOPHILS # BLD: 0.12 K/UL (ref 0–0.12)
BILIRUB SERPL-MCNC: 0.6 MG/DL (ref 0.1–1.5)
BUN SERPL-MCNC: 14 MG/DL (ref 8–22)
CALCIUM SERPL-MCNC: 9.3 MG/DL (ref 8.5–10.5)
CHLORIDE SERPL-SCNC: 108 MMOL/L (ref 96–112)
CHOLEST SERPL-MCNC: 170 MG/DL (ref 100–199)
CO2 SERPL-SCNC: 24 MMOL/L (ref 20–33)
CREAT SERPL-MCNC: 0.66 MG/DL (ref 0.5–1.4)
EOSINOPHIL # BLD AUTO: 0.28 K/UL (ref 0–0.51)
EOSINOPHIL NFR BLD: 5 % (ref 0–6.9)
ERYTHROCYTE [DISTWIDTH] IN BLOOD BY AUTOMATED COUNT: 43.8 FL (ref 35.9–50)
FASTING STATUS PATIENT QL REPORTED: NORMAL
GFR SERPLBLD CREATININE-BSD FMLA CKD-EPI: 103 ML/MIN/1.73 M 2
GLOBULIN SER CALC-MCNC: 2.7 G/DL (ref 1.9–3.5)
GLUCOSE SERPL-MCNC: 115 MG/DL (ref 65–99)
HCT VFR BLD AUTO: 50.5 % (ref 42–52)
HDLC SERPL-MCNC: 34 MG/DL
HGB BLD-MCNC: 16.9 G/DL (ref 14–18)
IMM GRANULOCYTES # BLD AUTO: 0.08 K/UL (ref 0–0.11)
IMM GRANULOCYTES NFR BLD AUTO: 1.4 % (ref 0–0.9)
LDLC SERPL CALC-MCNC: 108 MG/DL
LYMPHOCYTES # BLD AUTO: 1.71 K/UL (ref 1–4.8)
LYMPHOCYTES NFR BLD: 30.3 % (ref 22–41)
MCH RBC QN AUTO: 31 PG (ref 27–33)
MCHC RBC AUTO-ENTMCNC: 33.5 G/DL (ref 33.7–35.3)
MCV RBC AUTO: 92.5 FL (ref 81.4–97.8)
MONOCYTES # BLD AUTO: 0.45 K/UL (ref 0–0.85)
MONOCYTES NFR BLD AUTO: 8 % (ref 0–13.4)
NEUTROPHILS # BLD AUTO: 3.01 K/UL (ref 1.82–7.42)
NEUTROPHILS NFR BLD: 53.2 % (ref 44–72)
NRBC # BLD AUTO: 0 K/UL
NRBC BLD-RTO: 0 /100 WBC
PLATELET # BLD AUTO: 164 K/UL (ref 164–446)
PMV BLD AUTO: 12.9 FL (ref 9–12.9)
POTASSIUM SERPL-SCNC: 4.1 MMOL/L (ref 3.6–5.5)
PROT SERPL-MCNC: 7.2 G/DL (ref 6–8.2)
PSA SERPL-MCNC: 0.69 NG/ML (ref 0–4)
RBC # BLD AUTO: 5.46 M/UL (ref 4.7–6.1)
SODIUM SERPL-SCNC: 141 MMOL/L (ref 135–145)
TRIGL SERPL-MCNC: 141 MG/DL (ref 0–149)
TSH SERPL DL<=0.005 MIU/L-ACNC: 1.51 UIU/ML (ref 0.38–5.33)
WBC # BLD AUTO: 5.7 K/UL (ref 4.8–10.8)

## 2022-10-14 PROCEDURE — 36415 COLL VENOUS BLD VENIPUNCTURE: CPT

## 2022-10-14 PROCEDURE — 84153 ASSAY OF PSA TOTAL: CPT

## 2022-10-14 PROCEDURE — 80061 LIPID PANEL: CPT

## 2022-10-14 PROCEDURE — 80053 COMPREHEN METABOLIC PANEL: CPT

## 2022-10-14 PROCEDURE — 82306 VITAMIN D 25 HYDROXY: CPT

## 2022-10-14 PROCEDURE — 85025 COMPLETE CBC W/AUTO DIFF WBC: CPT

## 2022-10-14 PROCEDURE — 84443 ASSAY THYROID STIM HORMONE: CPT

## 2022-10-18 ENCOUNTER — TELEPHONE (OUTPATIENT)
Dept: CARDIOLOGY | Facility: MEDICAL CENTER | Age: 67
End: 2022-10-18

## 2022-10-18 ENCOUNTER — OFFICE VISIT (OUTPATIENT)
Dept: MEDICAL GROUP | Facility: PHYSICIAN GROUP | Age: 67
End: 2022-10-18
Payer: COMMERCIAL

## 2022-10-18 VITALS
RESPIRATION RATE: 18 BRPM | OXYGEN SATURATION: 97 % | WEIGHT: 269.25 LBS | TEMPERATURE: 97.3 F | SYSTOLIC BLOOD PRESSURE: 126 MMHG | HEART RATE: 76 BPM | HEIGHT: 70 IN | BODY MASS INDEX: 38.55 KG/M2 | DIASTOLIC BLOOD PRESSURE: 80 MMHG

## 2022-10-18 DIAGNOSIS — R73.01 ELEVATED FASTING BLOOD SUGAR: ICD-10-CM

## 2022-10-18 DIAGNOSIS — E78.2 MIXED HYPERLIPIDEMIA: ICD-10-CM

## 2022-10-18 DIAGNOSIS — E03.9 ACQUIRED HYPOTHYROIDISM: ICD-10-CM

## 2022-10-18 DIAGNOSIS — Z23 NEED FOR VACCINATION: ICD-10-CM

## 2022-10-18 PROCEDURE — 90662 IIV NO PRSV INCREASED AG IM: CPT | Performed by: NURSE PRACTITIONER

## 2022-10-18 PROCEDURE — 90471 IMMUNIZATION ADMIN: CPT | Performed by: NURSE PRACTITIONER

## 2022-10-18 PROCEDURE — 99214 OFFICE O/P EST MOD 30 MIN: CPT | Mod: 25 | Performed by: NURSE PRACTITIONER

## 2022-10-18 RX ORDER — METFORMIN HYDROCHLORIDE 500 MG/1
500 TABLET, EXTENDED RELEASE ORAL
Qty: 100 TABLET | Refills: 2 | Status: ON HOLD | OUTPATIENT
Start: 2022-10-18 | End: 2022-12-27

## 2022-10-18 RX ORDER — LEVOTHYROXINE SODIUM 0.15 MG/1
150 TABLET ORAL
Qty: 100 TABLET | Refills: 2 | Status: SHIPPED | OUTPATIENT
Start: 2022-10-18 | End: 2023-08-03 | Stop reason: SDUPTHER

## 2022-10-18 ASSESSMENT — FIBROSIS 4 INDEX: FIB4 SCORE: 1.83

## 2022-10-18 ASSESSMENT — PATIENT HEALTH QUESTIONNAIRE - PHQ9: CLINICAL INTERPRETATION OF PHQ2 SCORE: 0

## 2022-10-18 NOTE — PROGRESS NOTES
"Subjective  Chief Complaint  Establish care to manage his chronic conditions    History of Present Illness  Michael Villarreal is a 67 y.o. male. This patient is here today to establish care.  His prior PCP was Dr. Robson Ramos.    Acquired hypothyroidism  Currently taking levothyroxine 150 mcg daily as prescribed.   Denies symptoms including mood changes, anxiety/depression, chest pain/pressure, palpitations, fatigue, heat/cold intolerance, polydipsia, polyuria, diarrhea/constipation, abdominal pain, unexpected weight change, skin changes, hair thickening/coarsening/falling out/thinning, brittle nails, or edema.      Elevated fasting blood sugar  Chronic and ongoing. Previous PCP started him on Metformin  mg daily. He has been working on his diet and exercise.    Hyperlipidemia  Chronic, ongoing.  Currently taking Pravastatin 40 as directed.  Denies side effects of the medication--no myalgias or abdominal pain.  10-year cardiac risk ASCVD score: 18.3%  Reports diet is \"okay\".   He is following a low-cholesterol diet.  He is exercising regularly.  He is not taking ASA daily. Currently on Eliquis.  He denies dizziness, claudication, or chest pain.    Past Medical History    Allergies: Lipitor [atorvastatin]  Past Medical History:   Diagnosis Date    Daytime sleepiness     High cholesterol     Hyperlipidemia     Hypertension     Obesity     Paroxysmal atrial fibrillation (HCC)     Snoring     Unspecified cataract      Past Surgical History:   Procedure Laterality Date    CATARACT PHACO WITH IOL  4/20/2015    Performed by Jakob Garcia M.D. at SURGERY [facility] ORS    CATARACT PHACO WITH IOL  4/1/2015    Performed by Jakob Garcia M.D. at SURGERY SAME DAY [facility] ORS    DUPUYTREN CONTRACTURE RELEASE  7/17/2012    Performed by RAFAEL CALVILLO at SURGERY [facility] ORS    HAND SURGERY      TONSILLECTOMY       Current Outpatient Medications Ordered in Epic   Medication Sig Dispense Refill    levothyroxine " (SYNTHROID) 150 MCG Tab TAKE ONE TABLET BY MOUTH EVERY MORNING ON AN EMPTY STOMACH 100 Tablet 2    metFORMIN ER (GLUCOPHAGE XR) 500 MG TABLET SR 24 HR Take 1 Tablet by mouth every evening with dinner. 100 Tablet 2    Turmeric 500 MG Cap Take  by mouth.      apixaban (ELIQUIS) 5mg Tab TAKE ONE TABLET BY MOUTH TWICE A  Tablet 7    metoprolol tartrate (LOPRESSOR) 25 MG Tab Take 1 Tablet by mouth 2 times a day. 180 Tablet 7    propafenone (RYTHMOL) 150 MG Tab Take 1 Tablet by mouth every 8 hours. 270 Tablet 7    pravastatin (PRAVACHOL) 40 MG tablet Take 1 Tablet by mouth every day. 90 Tablet 7    potassium chloride SA (KDUR) 20 MEQ Tab CR Take 1 Tablet by mouth every day. 90 Tablet 7    MAGNESIUM PO Take  by mouth every day.      vitamin e (VITAMIN E) 400 UNIT Cap Take 400 Units by mouth every day.      Coenzyme Q10 (CO Q 10) 100 MG CAPS Take 1 Tab by mouth every day.      B Complex-C (SUPER B COMPLEX PO) Take 1 Tab by mouth every day.      vitamin D (CHOLECALCIFEROL) 1000 UNIT TABS Take 1,000 Units by mouth every day.       No current [facility]-ordered facility-administered medications on file.     Family History:    Family History   Problem Relation Age of Onset    Other Mother 68        kidney CA    Heart Disease Mother         CHF at 86      Personal/Social History:    Social History     Tobacco Use    Smoking status: Former     Packs/day: 1.00     Years: 5.00     Pack years: 5.00     Types: Cigarettes     Quit date: 1984     Years since quittin.2    Smokeless tobacco: Former     Types: Chew     Quit date: 1984   Vaping Use    Vaping Use: Never used   Substance Use Topics    Alcohol use: Not Currently     Comment: rarely    Drug use: No     Social History     Social History Narrative    Not on file      Review of Systems:     General: Negative for fever/chills and unexpected weight change.    Eyes:  Negative for vision changes, eye pain.   Respiratory:  Negative for cough and dyspnea.      "Cardiovascular:  Negative for chest pain and palpitations.   Musculoskeletal:  Negative for myalgias.    Skin:  Negative for rash.     Objective  Physical Exam:   /80 (BP Location: Left arm, Patient Position: Sitting, BP Cuff Size: Adult)   Pulse 76   Temp 36.3 °C (97.3 °F) (Temporal)   Resp 18   Ht 1.778 m (5' 10\")   Wt 122 kg (269 lb 4 oz)   SpO2 97%  Body mass index is 38.63 kg/m².  General:  Alert and oriented.  Well appearing.  NAD.  Head:  Normocephalic.   Neck: Supple without JVD. No lymphadenopathy.  Pulmonary:  Normal effort.  Clear to ausculation without rales, ronchi, or wheezing.  Cardiovascular:  Regular rate and rhythm without murmur, rubs or gallop.  Radial pulses are intact and equal bilaterally.  Musculoskeletal:  No extremity cyanosis, clubbing, or edema.  Skin:  Warm and dry.  No obvious lesions.  Psych: Normal mood and affect. Alert and oriented x3. Judgment and insight is normal.      Assessment/Plan   1. Acquired hypothyroidism  Chronic and ongoing.  Continue to take Levothyroxine 150 mcg every morning on an empty stomach.  - levothyroxine (SYNTHROID) 150 MCG Tab; TAKE ONE TABLET BY MOUTH EVERY MORNING ON AN EMPTY STOMACH  Dispense: 100 Tablet; Refill: 2    2. Mixed hyperlipidemia  Chronic and ongoing.  Continue to take Pravastatin 40 mg every evening.  Educated on a healthy diet and exercise.    3. Elevated fasting blood sugar  Chronic and ongoing.  Continue to take Metformin  mg daily.  Educated on a healthy diet and exercise.  - metFORMIN ER (GLUCOPHAGE XR) 500 MG TABLET SR 24 HR; Take 1 Tablet by mouth every evening with dinner.  Dispense: 100 Tablet; Refill: 2    4. Need for vaccination  - Influenza Vaccine, High Dose (65+ Only)      Health Maintenance: Discussed with the patient.    Return in about 4 months (around 2/18/2023) for F/U.    I have placed the above orders and discussed them with an approved delegating provider.  The MA is performing the below orders under " the direction of Dr. Toan Herring MD/DO.     Please note that this dictation was created using voice recognition software. I have made every reasonable attempt to correct obvious errors, but I expect that there are errors of grammar and possibly content that I did not discover before finalizing the note.    CHARO Morelos  [facility]

## 2022-10-18 NOTE — ASSESSMENT & PLAN NOTE
Currently taking levothyroxine 150 mcg daily as prescribed.   Denies symptoms including mood changes, anxiety/depression, chest pain/pressure, palpitations, fatigue, heat/cold intolerance, polydipsia, polyuria, diarrhea/constipation, abdominal pain, unexpected weight change, skin changes, hair thickening/coarsening/falling out/thinning, brittle nails, or edema.

## 2022-10-18 NOTE — TELEPHONE ENCOUNTER
Phone Number Called: [phone]    Call outcome: Spoke to Jane with [facility].    Message: Called to inform [facility] of clearance. [facility] requesting when to hold Eliquis. Advised that for low risk patient it is recommended to hold 48 hours prior to procedure and resume as soon as hemodynamically stable after procedure. Jane verbalizes understanding.

## 2022-10-18 NOTE — LETTER
[facility]  LUZ Ingram.P.R.N.  [address]  [place] [address]  Fax: [phone]   Authorization for Release/Disclosure of   Protected Health Information   Name: CHRISTINA VILLARREAL : 1955 SSN: [id number]   Address: [address] [place] [address] [place] [address] Phone:    [phone] (home)    I authorize the entity listed below to release/disclose the PHI below to:   [facility]/Cheryl Hagen A.P.R.N. and JAMILA Ingram.R.CORIN.   Provider or Entity Name:     Address   City, State, Zip   Phone:      Fax:     Reason for request: continuity of care   Information to be released:    [  ] LAST COLONOSCOPY,  including any PATH REPORT and follow-up  [  ] LAST FIT/COLOGUARD RESULT [  ] LAST DEXA  [  ] LAST MAMMOGRAM  [  ] LAST PAP  [  ] LAST LABS [  ] RETINA EXAM REPORT  [  ] IMMUNIZATION RECORDS  [XX] Release all info      [  ] Check here and initial the line next to each item to release ALL health information INCLUDING  _____ Care and treatment for drug and / or alcohol abuse  _____ HIV testing, infection status, or AIDS  _____ Genetic Testing    DATES OF SERVICE OR TIME PERIOD TO BE DISCLOSED: _____________  I understand and acknowledge that:  * This Authorization may be revoked at any time by you in writing, except if your health information has already been used or disclosed.  * Your health information that will be used or disclosed as a result of you signing this authorization could be re-disclosed by the recipient. If this occurs, your re-disclosed health information may no longer be protected by State or Federal laws.  * You may refuse to sign this Authorization. Your refusal will not affect your ability to obtain treatment.  * This Authorization becomes effective upon signing and will  on (date) __________.      If no date is indicated, this Authorization will  one (1) year from the signature date.    Name: Christina Villarreal    Signature:   Date:     10/18/2022       PLEASE FAX  REQUESTED RECORDS BACK TO: [phone]

## 2022-10-18 NOTE — TELEPHONE ENCOUNTER
Phone Number Called: [phone]    Call outcome: Left detailed message for [facility]. Informed to call back with any additional questions.    Message: Called to follow up on requested clearance.

## 2022-10-18 NOTE — TELEPHONE ENCOUNTER
Caller: Dagmar    Office Name, phone number, fax number:     [facility]  P:[phone]  F:[phone]    Fax clearance to [phone]    Procedure Name: N/A    Procedure Scheduled Date: N/A    Callback Number: [phone]

## 2022-10-18 NOTE — ASSESSMENT & PLAN NOTE
Chronic and ongoing. Previous PCP started him on Metformin  mg daily. He has been working on his diet and exercise.

## 2022-10-18 NOTE — ASSESSMENT & PLAN NOTE
"Chronic, ongoing.  Currently taking Pravastatin 40 as directed.  Denies side effects of the medication--no myalgias or abdominal pain.  10-year cardiac risk ASCVD score: 18.3%  Reports diet is \"okay\".   He is following a low-cholesterol diet.  He is exercising regularly.  He is not taking ASA daily. Currently on Eliquis.  He denies dizziness, claudication, or chest pain.    "

## 2022-10-20 PROCEDURE — RXMED WILLOW AMBULATORY MEDICATION CHARGE: Performed by: INTERNAL MEDICINE

## 2022-10-26 ENCOUNTER — PHARMACY VISIT (OUTPATIENT)
Dept: PHARMACY | Facility: MEDICAL CENTER | Age: 67
End: 2022-10-26
Payer: COMMERCIAL

## 2022-10-26 PROCEDURE — RXMED WILLOW AMBULATORY MEDICATION CHARGE: Performed by: NURSE PRACTITIONER

## 2022-10-27 ENCOUNTER — HOSPITAL ENCOUNTER (OUTPATIENT)
Dept: LAB | Facility: MEDICAL CENTER | Age: 67
End: 2022-10-27
Attending: CLINICAL NURSE SPECIALIST
Payer: COMMERCIAL

## 2022-10-27 PROCEDURE — 83013 H PYLORI (C-13) BREATH: CPT

## 2022-10-29 LAB — UREA BREATH TEST QL: NEGATIVE

## 2022-11-25 PROCEDURE — RXMED WILLOW AMBULATORY MEDICATION CHARGE: Performed by: INTERNAL MEDICINE

## 2022-11-27 PROCEDURE — RXMED WILLOW AMBULATORY MEDICATION CHARGE: Performed by: NURSE PRACTITIONER

## 2022-11-30 ENCOUNTER — PHARMACY VISIT (OUTPATIENT)
Dept: PHARMACY | Facility: MEDICAL CENTER | Age: 67
End: 2022-11-30
Payer: COMMERCIAL

## 2022-12-16 ENCOUNTER — PRE-ADMISSION TESTING (OUTPATIENT)
Dept: ADMISSIONS | Facility: MEDICAL CENTER | Age: 67
DRG: 621 | End: 2022-12-16
Attending: SURGERY
Payer: COMMERCIAL

## 2022-12-16 DIAGNOSIS — Z01.812 PRE-OPERATIVE LABORATORY EXAMINATION: ICD-10-CM

## 2022-12-16 DIAGNOSIS — Z01.810 PRE-OPERATIVE CARDIOVASCULAR EXAMINATION: ICD-10-CM

## 2022-12-16 LAB
ANION GAP SERPL CALC-SCNC: 9 MMOL/L (ref 7–16)
BUN SERPL-MCNC: 17 MG/DL (ref 8–22)
CALCIUM SERPL-MCNC: 9.8 MG/DL (ref 8.5–10.5)
CHLORIDE SERPL-SCNC: 104 MMOL/L (ref 96–112)
CO2 SERPL-SCNC: 27 MMOL/L (ref 20–33)
CREAT SERPL-MCNC: 0.81 MG/DL (ref 0.5–1.4)
EKG IMPRESSION: NORMAL
ERYTHROCYTE [DISTWIDTH] IN BLOOD BY AUTOMATED COUNT: 40.1 FL (ref 35.9–50)
GFR SERPLBLD CREATININE-BSD FMLA CKD-EPI: 96 ML/MIN/1.73 M 2
GLUCOSE SERPL-MCNC: 95 MG/DL (ref 65–99)
HCT VFR BLD AUTO: 51.3 % (ref 42–52)
HGB BLD-MCNC: 17.6 G/DL (ref 14–18)
MCH RBC QN AUTO: 30.8 PG (ref 27–33)
MCHC RBC AUTO-ENTMCNC: 34.3 G/DL (ref 33.7–35.3)
MCV RBC AUTO: 89.8 FL (ref 81.4–97.8)
PLATELET # BLD AUTO: 164 K/UL (ref 164–446)
PMV BLD AUTO: 12.9 FL (ref 9–12.9)
POTASSIUM SERPL-SCNC: 4.5 MMOL/L (ref 3.6–5.5)
RBC # BLD AUTO: 5.71 M/UL (ref 4.7–6.1)
SODIUM SERPL-SCNC: 140 MMOL/L (ref 135–145)
WBC # BLD AUTO: 7.2 K/UL (ref 4.8–10.8)

## 2022-12-16 PROCEDURE — 93005 ELECTROCARDIOGRAM TRACING: CPT

## 2022-12-16 PROCEDURE — 93010 ELECTROCARDIOGRAM REPORT: CPT | Performed by: INTERNAL MEDICINE

## 2022-12-16 PROCEDURE — 36415 COLL VENOUS BLD VENIPUNCTURE: CPT

## 2022-12-16 PROCEDURE — 80048 BASIC METABOLIC PNL TOTAL CA: CPT

## 2022-12-16 PROCEDURE — 85027 COMPLETE CBC AUTOMATED: CPT

## 2022-12-16 ASSESSMENT — FIBROSIS 4 INDEX: FIB4 SCORE: 1.83

## 2022-12-26 ENCOUNTER — ANESTHESIA EVENT (OUTPATIENT)
Dept: SURGERY | Facility: MEDICAL CENTER | Age: 67
DRG: 621 | End: 2022-12-26
Payer: COMMERCIAL

## 2022-12-27 ENCOUNTER — HOSPITAL ENCOUNTER (INPATIENT)
Facility: MEDICAL CENTER | Age: 67
LOS: 1 days | DRG: 621 | End: 2022-12-28
Attending: SURGERY | Admitting: SURGERY
Payer: COMMERCIAL

## 2022-12-27 ENCOUNTER — ANESTHESIA (OUTPATIENT)
Dept: SURGERY | Facility: MEDICAL CENTER | Age: 67
DRG: 621 | End: 2022-12-27
Payer: COMMERCIAL

## 2022-12-27 DIAGNOSIS — G89.18 POSTOPERATIVE PAIN: ICD-10-CM

## 2022-12-27 PROBLEM — E66.01 MORBID OBESITY (HCC): Status: ACTIVE | Noted: 2022-12-27

## 2022-12-27 LAB
GLUCOSE BLD STRIP.AUTO-MCNC: 98 MG/DL (ref 65–99)
PATHOLOGY CONSULT NOTE: NORMAL

## 2022-12-27 PROCEDURE — 160009 HCHG ANES TIME/MIN: Performed by: SURGERY

## 2022-12-27 PROCEDURE — A9270 NON-COVERED ITEM OR SERVICE: HCPCS | Performed by: SURGERY

## 2022-12-27 PROCEDURE — 700101 HCHG RX REV CODE 250: Performed by: STUDENT IN AN ORGANIZED HEALTH CARE EDUCATION/TRAINING PROGRAM

## 2022-12-27 PROCEDURE — 82962 GLUCOSE BLOOD TEST: CPT

## 2022-12-27 PROCEDURE — 88307 TISSUE EXAM BY PATHOLOGIST: CPT

## 2022-12-27 PROCEDURE — 00797 ANES IPER UPR ABD GSTR PX MO: CPT | Performed by: STUDENT IN AN ORGANIZED HEALTH CARE EDUCATION/TRAINING PROGRAM

## 2022-12-27 PROCEDURE — 160048 HCHG OR STATISTICAL LEVEL 1-5: Performed by: SURGERY

## 2022-12-27 PROCEDURE — 700105 HCHG RX REV CODE 258: Performed by: SURGERY

## 2022-12-27 PROCEDURE — 160035 HCHG PACU - 1ST 60 MINS PHASE I: Performed by: SURGERY

## 2022-12-27 PROCEDURE — 160002 HCHG RECOVERY MINUTES (STAT): Performed by: SURGERY

## 2022-12-27 PROCEDURE — 700101 HCHG RX REV CODE 250: Performed by: SURGERY

## 2022-12-27 PROCEDURE — 700111 HCHG RX REV CODE 636 W/ 250 OVERRIDE (IP): Performed by: SURGERY

## 2022-12-27 PROCEDURE — 160041 HCHG SURGERY MINUTES - EA ADDL 1 MIN LEVEL 4: Performed by: SURGERY

## 2022-12-27 PROCEDURE — 770001 HCHG ROOM/CARE - MED/SURG/GYN PRIV*

## 2022-12-27 PROCEDURE — 700111 HCHG RX REV CODE 636 W/ 250 OVERRIDE (IP): Performed by: STUDENT IN AN ORGANIZED HEALTH CARE EDUCATION/TRAINING PROGRAM

## 2022-12-27 PROCEDURE — 160029 HCHG SURGERY MINUTES - 1ST 30 MINS LEVEL 4: Performed by: SURGERY

## 2022-12-27 PROCEDURE — 700105 HCHG RX REV CODE 258: Performed by: STUDENT IN AN ORGANIZED HEALTH CARE EDUCATION/TRAINING PROGRAM

## 2022-12-27 PROCEDURE — 700102 HCHG RX REV CODE 250 W/ 637 OVERRIDE(OP): Performed by: STUDENT IN AN ORGANIZED HEALTH CARE EDUCATION/TRAINING PROGRAM

## 2022-12-27 PROCEDURE — 0DB64Z3 EXCISION OF STOMACH, PERCUTANEOUS ENDOSCOPIC APPROACH, VERTICAL: ICD-10-PCS | Performed by: SURGERY

## 2022-12-27 PROCEDURE — RXMED WILLOW AMBULATORY MEDICATION CHARGE: Performed by: SURGERY

## 2022-12-27 PROCEDURE — 700102 HCHG RX REV CODE 250 W/ 637 OVERRIDE(OP): Performed by: SURGERY

## 2022-12-27 PROCEDURE — 160036 HCHG PACU - EA ADDL 30 MINS PHASE I: Performed by: SURGERY

## 2022-12-27 PROCEDURE — A9270 NON-COVERED ITEM OR SERVICE: HCPCS | Performed by: STUDENT IN AN ORGANIZED HEALTH CARE EDUCATION/TRAINING PROGRAM

## 2022-12-27 RX ORDER — SCOLOPAMINE TRANSDERMAL SYSTEM 1 MG/1
1 PATCH, EXTENDED RELEASE TRANSDERMAL ONCE
Status: DISCONTINUED | OUTPATIENT
Start: 2022-12-27 | End: 2022-12-28 | Stop reason: HOSPADM

## 2022-12-27 RX ORDER — DIPHENHYDRAMINE HCL 25 MG
25 TABLET ORAL EVERY 6 HOURS PRN
Status: DISCONTINUED | OUTPATIENT
Start: 2022-12-27 | End: 2022-12-28 | Stop reason: HOSPADM

## 2022-12-27 RX ORDER — OXYCODONE HCL 5 MG/5 ML
10 SOLUTION, ORAL ORAL
Status: DISCONTINUED | OUTPATIENT
Start: 2022-12-27 | End: 2022-12-28 | Stop reason: HOSPADM

## 2022-12-27 RX ORDER — SODIUM CHLORIDE, SODIUM LACTATE, POTASSIUM CHLORIDE, AND CALCIUM CHLORIDE .6; .31; .03; .02 G/100ML; G/100ML; G/100ML; G/100ML
500 INJECTION, SOLUTION INTRAVENOUS
Status: DISCONTINUED | OUTPATIENT
Start: 2022-12-27 | End: 2022-12-28 | Stop reason: HOSPADM

## 2022-12-27 RX ORDER — MEPERIDINE HYDROCHLORIDE 25 MG/ML
12.5 INJECTION INTRAMUSCULAR; INTRAVENOUS; SUBCUTANEOUS
Status: DISCONTINUED | OUTPATIENT
Start: 2022-12-27 | End: 2022-12-27 | Stop reason: HOSPADM

## 2022-12-27 RX ORDER — POLYETHYLENE GLYCOL 3350 17 G/17G
17 POWDER, FOR SOLUTION ORAL DAILY
Qty: 20 EACH | Refills: 0 | Status: SHIPPED | OUTPATIENT
Start: 2022-12-27 | End: 2023-02-03

## 2022-12-27 RX ORDER — ENOXAPARIN SODIUM 100 MG/ML
30 INJECTION SUBCUTANEOUS EVERY 12 HOURS
Status: DISCONTINUED | OUTPATIENT
Start: 2022-12-27 | End: 2022-12-27

## 2022-12-27 RX ORDER — ACETAMINOPHEN 500 MG
1000 TABLET ORAL EVERY 6 HOURS
Status: DISCONTINUED | OUTPATIENT
Start: 2023-01-01 | End: 2022-12-28 | Stop reason: HOSPADM

## 2022-12-27 RX ORDER — CELECOXIB 100 MG/1
100 CAPSULE ORAL 2 TIMES DAILY
Qty: 20 CAPSULE | Refills: 0 | Status: SHIPPED | OUTPATIENT
Start: 2022-12-27 | End: 2023-02-06

## 2022-12-27 RX ORDER — ONDANSETRON 2 MG/ML
INJECTION INTRAMUSCULAR; INTRAVENOUS PRN
Status: DISCONTINUED | OUTPATIENT
Start: 2022-12-27 | End: 2022-12-27 | Stop reason: SURG

## 2022-12-27 RX ORDER — OXYCODONE HCL 5 MG/5 ML
5 SOLUTION, ORAL ORAL
Status: COMPLETED | OUTPATIENT
Start: 2022-12-27 | End: 2022-12-27

## 2022-12-27 RX ORDER — ONDANSETRON 4 MG/1
4 TABLET, ORALLY DISINTEGRATING ORAL EVERY 6 HOURS PRN
Qty: 18 TABLET | Refills: 0 | Status: SHIPPED | OUTPATIENT
Start: 2022-12-27 | End: 2023-02-03

## 2022-12-27 RX ORDER — BUPIVACAINE HYDROCHLORIDE AND EPINEPHRINE 5; 5 MG/ML; UG/ML
INJECTION, SOLUTION EPIDURAL; INTRACAUDAL; PERINEURAL
Status: DISCONTINUED | OUTPATIENT
Start: 2022-12-27 | End: 2022-12-27 | Stop reason: HOSPADM

## 2022-12-27 RX ORDER — OXYCODONE HCL 5 MG/5 ML
10 SOLUTION, ORAL ORAL
Status: COMPLETED | OUTPATIENT
Start: 2022-12-27 | End: 2022-12-27

## 2022-12-27 RX ORDER — HYDROMORPHONE HYDROCHLORIDE 2 MG/ML
INJECTION, SOLUTION INTRAMUSCULAR; INTRAVENOUS; SUBCUTANEOUS PRN
Status: DISCONTINUED | OUTPATIENT
Start: 2022-12-27 | End: 2022-12-27 | Stop reason: SURG

## 2022-12-27 RX ORDER — ROCURONIUM BROMIDE 10 MG/ML
INJECTION, SOLUTION INTRAVENOUS PRN
Status: DISCONTINUED | OUTPATIENT
Start: 2022-12-27 | End: 2022-12-27 | Stop reason: SURG

## 2022-12-27 RX ORDER — SODIUM CHLORIDE, SODIUM LACTATE, POTASSIUM CHLORIDE, CALCIUM CHLORIDE 600; 310; 30; 20 MG/100ML; MG/100ML; MG/100ML; MG/100ML
INJECTION, SOLUTION INTRAVENOUS CONTINUOUS
Status: ACTIVE | OUTPATIENT
Start: 2022-12-27 | End: 2022-12-27

## 2022-12-27 RX ORDER — HYDROMORPHONE HYDROCHLORIDE 1 MG/ML
0.2 INJECTION, SOLUTION INTRAMUSCULAR; INTRAVENOUS; SUBCUTANEOUS
Status: DISCONTINUED | OUTPATIENT
Start: 2022-12-27 | End: 2022-12-27 | Stop reason: HOSPADM

## 2022-12-27 RX ORDER — HALOPERIDOL 5 MG/ML
1 INJECTION INTRAMUSCULAR
Status: DISCONTINUED | OUTPATIENT
Start: 2022-12-27 | End: 2022-12-27 | Stop reason: HOSPADM

## 2022-12-27 RX ORDER — DEXAMETHASONE SODIUM PHOSPHATE 4 MG/ML
INJECTION, SOLUTION INTRA-ARTICULAR; INTRALESIONAL; INTRAMUSCULAR; INTRAVENOUS; SOFT TISSUE PRN
Status: DISCONTINUED | OUTPATIENT
Start: 2022-12-27 | End: 2022-12-27 | Stop reason: SURG

## 2022-12-27 RX ORDER — DIPHENHYDRAMINE HYDROCHLORIDE 50 MG/ML
12.5 INJECTION INTRAMUSCULAR; INTRAVENOUS
Status: DISCONTINUED | OUTPATIENT
Start: 2022-12-27 | End: 2022-12-27 | Stop reason: HOSPADM

## 2022-12-27 RX ORDER — DIPHENHYDRAMINE HYDROCHLORIDE 50 MG/ML
12.5 INJECTION INTRAMUSCULAR; INTRAVENOUS EVERY 6 HOURS PRN
Status: DISCONTINUED | OUTPATIENT
Start: 2022-12-27 | End: 2022-12-28 | Stop reason: HOSPADM

## 2022-12-27 RX ORDER — HYDROMORPHONE HYDROCHLORIDE 1 MG/ML
0.1 INJECTION, SOLUTION INTRAMUSCULAR; INTRAVENOUS; SUBCUTANEOUS
Status: DISCONTINUED | OUTPATIENT
Start: 2022-12-27 | End: 2022-12-27 | Stop reason: HOSPADM

## 2022-12-27 RX ORDER — IBUPROFEN 200 MG
800 TABLET ORAL 3 TIMES DAILY PRN
Status: DISCONTINUED | OUTPATIENT
Start: 2022-12-30 | End: 2022-12-28 | Stop reason: HOSPADM

## 2022-12-27 RX ORDER — PROPAFENONE HYDROCHLORIDE 150 MG/1
150 TABLET, COATED ORAL EVERY 8 HOURS
Status: DISCONTINUED | OUTPATIENT
Start: 2022-12-27 | End: 2022-12-28 | Stop reason: HOSPADM

## 2022-12-27 RX ORDER — DIPHENHYDRAMINE HYDROCHLORIDE 50 MG/ML
25 INJECTION INTRAMUSCULAR; INTRAVENOUS EVERY 6 HOURS PRN
Status: DISCONTINUED | OUTPATIENT
Start: 2022-12-27 | End: 2022-12-28 | Stop reason: HOSPADM

## 2022-12-27 RX ORDER — SODIUM CHLORIDE, SODIUM LACTATE, POTASSIUM CHLORIDE, CALCIUM CHLORIDE 600; 310; 30; 20 MG/100ML; MG/100ML; MG/100ML; MG/100ML
INJECTION, SOLUTION INTRAVENOUS CONTINUOUS
Status: DISCONTINUED | OUTPATIENT
Start: 2022-12-27 | End: 2022-12-27 | Stop reason: HOSPADM

## 2022-12-27 RX ORDER — ENALAPRILAT 1.25 MG/ML
2.5 INJECTION INTRAVENOUS EVERY 6 HOURS PRN
Status: DISCONTINUED | OUTPATIENT
Start: 2022-12-27 | End: 2022-12-28 | Stop reason: HOSPADM

## 2022-12-27 RX ORDER — OXYCODONE HCL 5 MG/5 ML
5 SOLUTION, ORAL ORAL
Status: DISCONTINUED | OUTPATIENT
Start: 2022-12-27 | End: 2022-12-28 | Stop reason: HOSPADM

## 2022-12-27 RX ORDER — HALOPERIDOL 5 MG/ML
1 INJECTION INTRAMUSCULAR EVERY 6 HOURS PRN
Status: DISCONTINUED | OUTPATIENT
Start: 2022-12-27 | End: 2022-12-28 | Stop reason: HOSPADM

## 2022-12-27 RX ORDER — DEXTROSE MONOHYDRATE, SODIUM CHLORIDE, AND POTASSIUM CHLORIDE 50; 1.49; 4.5 G/1000ML; G/1000ML; G/1000ML
INJECTION, SOLUTION INTRAVENOUS CONTINUOUS
Status: DISCONTINUED | OUTPATIENT
Start: 2022-12-27 | End: 2022-12-28 | Stop reason: HOSPADM

## 2022-12-27 RX ORDER — ACETAMINOPHEN 10 MG/ML
1 INJECTION, SOLUTION INTRAVENOUS ONCE
Status: COMPLETED | OUTPATIENT
Start: 2022-12-27 | End: 2022-12-27

## 2022-12-27 RX ORDER — HYDROMORPHONE HYDROCHLORIDE 1 MG/ML
0.5 INJECTION, SOLUTION INTRAMUSCULAR; INTRAVENOUS; SUBCUTANEOUS
Status: DISCONTINUED | OUTPATIENT
Start: 2022-12-27 | End: 2022-12-28 | Stop reason: HOSPADM

## 2022-12-27 RX ORDER — ONDANSETRON 2 MG/ML
4 INJECTION INTRAMUSCULAR; INTRAVENOUS EVERY 4 HOURS PRN
Status: DISCONTINUED | OUTPATIENT
Start: 2022-12-27 | End: 2022-12-28 | Stop reason: HOSPADM

## 2022-12-27 RX ORDER — LIDOCAINE HYDROCHLORIDE 10 MG/ML
INJECTION, SOLUTION EPIDURAL; INFILTRATION; INTRACAUDAL; PERINEURAL PRN
Status: DISCONTINUED | OUTPATIENT
Start: 2022-12-27 | End: 2022-12-27 | Stop reason: SURG

## 2022-12-27 RX ORDER — OXYCODONE HCL 5 MG/5 ML
5 SOLUTION, ORAL ORAL EVERY 4 HOURS PRN
Qty: 100 ML | Refills: 0 | Status: SHIPPED | OUTPATIENT
Start: 2022-12-27 | End: 2023-01-01

## 2022-12-27 RX ORDER — ONDANSETRON 2 MG/ML
4 INJECTION INTRAMUSCULAR; INTRAVENOUS
Status: COMPLETED | OUTPATIENT
Start: 2022-12-27 | End: 2022-12-27

## 2022-12-27 RX ORDER — KETOROLAC TROMETHAMINE 30 MG/ML
15 INJECTION, SOLUTION INTRAMUSCULAR; INTRAVENOUS EVERY 6 HOURS
Status: DISCONTINUED | OUTPATIENT
Start: 2022-12-27 | End: 2022-12-28 | Stop reason: HOSPADM

## 2022-12-27 RX ORDER — HYDROMORPHONE HYDROCHLORIDE 1 MG/ML
0.4 INJECTION, SOLUTION INTRAMUSCULAR; INTRAVENOUS; SUBCUTANEOUS
Status: DISCONTINUED | OUTPATIENT
Start: 2022-12-27 | End: 2022-12-27 | Stop reason: HOSPADM

## 2022-12-27 RX ORDER — OMEPRAZOLE 20 MG/1
20 CAPSULE, DELAYED RELEASE ORAL DAILY
Qty: 30 CAPSULE | Refills: 11 | Status: SHIPPED | OUTPATIENT
Start: 2022-12-27

## 2022-12-27 RX ORDER — ACETAMINOPHEN 10 MG/ML
1000 INJECTION, SOLUTION INTRAVENOUS EVERY 6 HOURS
Status: COMPLETED | OUTPATIENT
Start: 2022-12-27 | End: 2022-12-27

## 2022-12-27 RX ORDER — PROMETHAZINE HYDROCHLORIDE 25 MG/1
25 SUPPOSITORY RECTAL EVERY 4 HOURS PRN
Status: DISCONTINUED | OUTPATIENT
Start: 2022-12-27 | End: 2022-12-28 | Stop reason: HOSPADM

## 2022-12-27 RX ORDER — SENNOSIDES 8.6 MG
650 CAPSULE ORAL EVERY 6 HOURS
Qty: 30 TABLET | Refills: 0 | Status: SHIPPED | OUTPATIENT
Start: 2022-12-27 | End: 2023-02-03

## 2022-12-27 RX ORDER — ACETAMINOPHEN 500 MG
1000 TABLET ORAL EVERY 6 HOURS
Status: DISCONTINUED | OUTPATIENT
Start: 2022-12-28 | End: 2022-12-28 | Stop reason: HOSPADM

## 2022-12-27 RX ORDER — LEVOTHYROXINE SODIUM 0.15 MG/1
150 TABLET ORAL
Status: DISCONTINUED | OUTPATIENT
Start: 2022-12-28 | End: 2022-12-28 | Stop reason: HOSPADM

## 2022-12-27 RX ORDER — CEFAZOLIN SODIUM 1 G/3ML
INJECTION, POWDER, FOR SOLUTION INTRAMUSCULAR; INTRAVENOUS PRN
Status: DISCONTINUED | OUTPATIENT
Start: 2022-12-27 | End: 2022-12-27 | Stop reason: SURG

## 2022-12-27 RX ADMIN — HYDROMORPHONE HYDROCHLORIDE 0.4 MG: 1 INJECTION, SOLUTION INTRAMUSCULAR; INTRAVENOUS; SUBCUTANEOUS at 10:57

## 2022-12-27 RX ADMIN — HYDROMORPHONE HYDROCHLORIDE 0.4 MG: 1 INJECTION, SOLUTION INTRAMUSCULAR; INTRAVENOUS; SUBCUTANEOUS at 11:07

## 2022-12-27 RX ADMIN — HYDROMORPHONE HYDROCHLORIDE 0.2 MG: 1 INJECTION, SOLUTION INTRAMUSCULAR; INTRAVENOUS; SUBCUTANEOUS at 11:02

## 2022-12-27 RX ADMIN — SODIUM CHLORIDE, POTASSIUM CHLORIDE, SODIUM LACTATE AND CALCIUM CHLORIDE: 600; 310; 30; 20 INJECTION, SOLUTION INTRAVENOUS at 07:37

## 2022-12-27 RX ADMIN — FENTANYL CITRATE 50 MCG: 50 INJECTION, SOLUTION INTRAMUSCULAR; INTRAVENOUS at 10:25

## 2022-12-27 RX ADMIN — EPHEDRINE SULFATE 5 MG: 50 INJECTION, SOLUTION INTRAVENOUS at 09:35

## 2022-12-27 RX ADMIN — POTASSIUM CHLORIDE, DEXTROSE MONOHYDRATE AND SODIUM CHLORIDE: 150; 5; 450 INJECTION, SOLUTION INTRAVENOUS at 14:44

## 2022-12-27 RX ADMIN — ACETAMINOPHEN 1000 MG: 10 INJECTION INTRAVENOUS at 19:56

## 2022-12-27 RX ADMIN — ACETAMINOPHEN 1000 MG: 10 INJECTION INTRAVENOUS at 14:10

## 2022-12-27 RX ADMIN — DEXAMETHASONE SODIUM PHOSPHATE 4 MG: 4 INJECTION, SOLUTION INTRA-ARTICULAR; INTRALESIONAL; INTRAMUSCULAR; INTRAVENOUS; SOFT TISSUE at 09:30

## 2022-12-27 RX ADMIN — CEFAZOLIN 3 G: 330 INJECTION, POWDER, FOR SOLUTION INTRAMUSCULAR; INTRAVENOUS at 09:25

## 2022-12-27 RX ADMIN — HYDROMORPHONE HYDROCHLORIDE 0.2 MCG: 2 INJECTION INTRAMUSCULAR; INTRAVENOUS; SUBCUTANEOUS at 09:55

## 2022-12-27 RX ADMIN — KETOROLAC TROMETHAMINE 15 MG: 30 INJECTION, SOLUTION INTRAMUSCULAR; INTRAVENOUS at 19:56

## 2022-12-27 RX ADMIN — ROCURONIUM BROMIDE 50 MG: 10 INJECTION, SOLUTION INTRAVENOUS at 09:19

## 2022-12-27 RX ADMIN — PROPOFOL 200 MG: 10 INJECTION, EMULSION INTRAVENOUS at 09:19

## 2022-12-27 RX ADMIN — LIDOCAINE HYDROCHLORIDE 100 MG: 10 INJECTION, SOLUTION EPIDURAL; INFILTRATION; INTRACAUDAL; PERINEURAL at 09:19

## 2022-12-27 RX ADMIN — KETOROLAC TROMETHAMINE 15 MG: 30 INJECTION, SOLUTION INTRAMUSCULAR; INTRAVENOUS at 13:57

## 2022-12-27 RX ADMIN — PROPAFENONE HYDROCHLORIDE 150 MG: 150 TABLET, FILM COATED ORAL at 16:14

## 2022-12-27 RX ADMIN — FAMOTIDINE 20 MG: 10 INJECTION, SOLUTION INTRAVENOUS at 17:47

## 2022-12-27 RX ADMIN — PROPAFENONE HYDROCHLORIDE 150 MG: 150 TABLET, FILM COATED ORAL at 22:24

## 2022-12-27 RX ADMIN — ONDANSETRON 4 MG: 2 INJECTION INTRAMUSCULAR; INTRAVENOUS at 11:49

## 2022-12-27 RX ADMIN — SCOPOLAMINE 1 PATCH: 1.5 PATCH, EXTENDED RELEASE TRANSDERMAL at 07:24

## 2022-12-27 RX ADMIN — FENTANYL CITRATE 100 MCG: 50 INJECTION, SOLUTION INTRAMUSCULAR; INTRAVENOUS at 09:19

## 2022-12-27 RX ADMIN — HYDROMORPHONE HYDROCHLORIDE 0.4 MG: 1 INJECTION, SOLUTION INTRAMUSCULAR; INTRAVENOUS; SUBCUTANEOUS at 10:45

## 2022-12-27 RX ADMIN — HYDROMORPHONE HYDROCHLORIDE 0.2 MG: 1 INJECTION, SOLUTION INTRAMUSCULAR; INTRAVENOUS; SUBCUTANEOUS at 11:27

## 2022-12-27 RX ADMIN — PROPOFOL 60 MG: 10 INJECTION, EMULSION INTRAVENOUS at 10:01

## 2022-12-27 RX ADMIN — OXYCODONE HYDROCHLORIDE 10 MG: 5 SOLUTION ORAL at 10:21

## 2022-12-27 RX ADMIN — METHOCARBAMOL 1000 MG: 100 INJECTION INTRAMUSCULAR; INTRAVENOUS at 10:37

## 2022-12-27 RX ADMIN — ONDANSETRON 4 MG: 2 INJECTION INTRAMUSCULAR; INTRAVENOUS at 10:00

## 2022-12-27 RX ADMIN — FENTANYL CITRATE 50 MCG: 50 INJECTION, SOLUTION INTRAMUSCULAR; INTRAVENOUS at 10:23

## 2022-12-27 RX ADMIN — EPHEDRINE SULFATE 5 MG: 50 INJECTION, SOLUTION INTRAVENOUS at 09:33

## 2022-12-27 RX ADMIN — HYDROMORPHONE HYDROCHLORIDE 0.4 MG: 1 INJECTION, SOLUTION INTRAMUSCULAR; INTRAVENOUS; SUBCUTANEOUS at 11:19

## 2022-12-27 RX ADMIN — METOPROLOL TARTRATE 25 MG: 25 TABLET, FILM COATED ORAL at 17:46

## 2022-12-27 RX ADMIN — APIXABAN 5 MG: 5 TABLET, FILM COATED ORAL at 17:47

## 2022-12-27 RX ADMIN — HALOPERIDOL LACTATE 1 MG: 5 INJECTION, SOLUTION INTRAMUSCULAR at 14:02

## 2022-12-27 RX ADMIN — SUGAMMADEX 200 MG: 100 INJECTION, SOLUTION INTRAVENOUS at 10:00

## 2022-12-27 RX ADMIN — ACETAMINOPHEN 1 G: 10 INJECTION INTRAVENOUS at 07:38

## 2022-12-27 ASSESSMENT — LIFESTYLE VARIABLES
TOTAL SCORE: 0
EVER HAD A DRINK FIRST THING IN THE MORNING TO STEADY YOUR NERVES TO GET RID OF A HANGOVER: NO
HAVE PEOPLE ANNOYED YOU BY CRITICIZING YOUR DRINKING: NO
TOTAL SCORE: 0
ALCOHOL_USE: NO
EVER FELT BAD OR GUILTY ABOUT YOUR DRINKING: NO
HOW MANY TIMES IN THE PAST YEAR HAVE YOU HAD 5 OR MORE DRINKS IN A DAY: 0
HAVE YOU EVER FELT YOU SHOULD CUT DOWN ON YOUR DRINKING: NO
TOTAL SCORE: 0
AVERAGE NUMBER OF DAYS PER WEEK YOU HAVE A DRINK CONTAINING ALCOHOL: 0
ON A TYPICAL DAY WHEN YOU DRINK ALCOHOL HOW MANY DRINKS DO YOU HAVE: 0
CONSUMPTION TOTAL: NEGATIVE
DOES PATIENT WANT TO STOP DRINKING: NO

## 2022-12-27 ASSESSMENT — COGNITIVE AND FUNCTIONAL STATUS - GENERAL
SUGGESTED CMS G CODE MODIFIER DAILY ACTIVITY: CH
DAILY ACTIVITIY SCORE: 24
MOBILITY SCORE: 24
SUGGESTED CMS G CODE MODIFIER MOBILITY: CH

## 2022-12-27 ASSESSMENT — PAIN DESCRIPTION - PAIN TYPE
TYPE: ACUTE PAIN;SURGICAL PAIN

## 2022-12-27 ASSESSMENT — FIBROSIS 4 INDEX: FIB4 SCORE: 1.83

## 2022-12-27 ASSESSMENT — PATIENT HEALTH QUESTIONNAIRE - PHQ9
SUM OF ALL RESPONSES TO PHQ9 QUESTIONS 1 AND 2: 0
1. LITTLE INTEREST OR PLEASURE IN DOING THINGS: NOT AT ALL
2. FEELING DOWN, DEPRESSED, IRRITABLE, OR HOPELESS: NOT AT ALL

## 2022-12-27 NOTE — ANESTHESIA PROCEDURE NOTES
Airway    Date/Time: 12/27/2022 9:22 AM  Performed by: Ha Chase M.D.  Authorized by: Ha Chase M.D.     Location:  OR  Urgency:  Elective  Difficult Airway: No    Indications for Airway Management:  Anesthesia      Spontaneous Ventilation: absent    Sedation Level:  Deep  Preoxygenated: Yes    Patient Position:  Sniffing  Mask Difficulty Assessment:  2 - vent by mask + OA or adjuvant +/- NMBA  Final Airway Type:  Endotracheal airway  Final Endotracheal Airway:  ETT  Cuffed: Yes    Technique Used for Successful ETT Placement:  Direct laryngoscopy  Devices/Methods Used in Placement:  Anterior pressure/BURP    Insertion Site:  Oral  Blade Type:  Chemo  Laryngoscope Blade/Videolaryngoscope Blade Size:  3  ETT Size (mm):  7.0  Measured from:  Teeth  ETT to Teeth (cm):  22  Placement Verified by: auscultation and capnometry    Cormack-Lehane Classification:  Grade III - view of epiglottis only  Number of Attempts at Approach:  1   Suggest MAC 4

## 2022-12-27 NOTE — OR NURSING
10 L oxy-mask per ERAS order.  Six hours complete at 1515.  No c/o nausea, tolerated sip of water with liquid pain medication. Zofran for nausea.  X 5 laparoscopic abdominal sites CDI, Dermabond.  Ice pack in place.  Surgical pain now tolerable per t, 2/10.   VSS, afebrile, [place], A/O x4.     Scopolamine patch in place.       Transferred with face mask in place.     Oxygen tank 75% full.  One green small duffel bag on [facility].

## 2022-12-27 NOTE — PROGRESS NOTES
Med Rec Complete per patient  Allergies Reviewed with patient  No antibiotics within the last 30 days  Patient's Preferred Pharmacy:  [facility] on [address]       Patient takes Eliquis 5mg, twice daily.

## 2022-12-27 NOTE — ANESTHESIA PREPROCEDURE EVALUATION
Case: [id number] Date/Time: 12/27/22 0845    Procedures:       LAPAROSCOPIC SLEEVE GASTRECTOMY WITH HIATAL HERNIA REPAIR.      REPAIR, HERNIA, HIATAL, LAPAROSCOPIC.    Pre-op diagnosis: MORBID OBESITY    Location: [facility] 09 / SURGERY [facility]    Surgeons: Aubrey Barrera M.D.        66 yo M w/ pAfib, HTN/HLD, hypothyroidism  Relevant Problems   CARDIAC   (positive) PAF (paroxysmal atrial fibrillation) (HCC)      ENDO   (positive) Acquired hypothyroidism       Physical Exam    Airway   Mallampati: III  TM distance: >3 FB  Neck ROM: full       Cardiovascular - normal exam  Rhythm: regular  Rate: normal  (-) murmur     Dental - normal exam           Pulmonary - normal exam  Breath sounds clear to auscultation     Abdominal   (+) obese     Neurological - normal exam                 Anesthesia Plan    ASA 3   ASA physical status 3 criteria: morbid obesity - BMI greater than or equal to 40    Plan - general       Airway plan will be ETT          Induction: intravenous    Postoperative Plan: Postoperative administration of opioids is intended.    Pertinent diagnostic labs and testing reviewed    Informed Consent:    Anesthetic plan and risks discussed with patient.    Use of blood products discussed with: patient whom consented to blood products.

## 2022-12-27 NOTE — ANESTHESIA TIME REPORT
Anesthesia Start and Stop Event Times     Date Time Event    12/27/2022 0904 Ready for Procedure     0916 Anesthesia Start     1015 Anesthesia Stop        Responsible Staff  12/27/22    Name Role Begin End    Ha Chase M.D. Anesth 0916 1015        Overtime Reason:  no overtime (within assigned shift)    Comments:

## 2022-12-27 NOTE — PROGRESS NOTES
Report received from PACU RN.  Assessment complete.  A&O x 4. Patient calls appropriately.  Patient ambulates with SB assist.    Patient reports minimal pain. Pain managed with prescribed medications.  Denies N&V. Gastric clear liquid diet ordered.  - void, - flatus, - BM.  Patient denies SOB.  SCD's refused.    Review plan with of care with patient. Call light and personal belongings within reach. Hourly rounding in place. All needs met at this time.

## 2022-12-27 NOTE — PROGRESS NOTES
4 Eyes Skin Assessment Completed by LY Soto and Vi Yang RN.    Head WDL  Ears WDL  Nose WDL  Mouth WDL  Neck WDL  Breast/Chest WDL  Shoulder Blades WDL  Spine WDL  (R) Arm/Elbow/Hand WDL  (L) Arm/Elbow/Hand WDL  Abdomen x 4 lap sites with dermabond, approximated, LUIS  Groin WDL  Scrotum/Coccyx/Buttocks WDL  (R) Leg WDL  (L) Leg WDL  (R) Heel/Foot/Toe WDL  (L) Heel/Foot/Toe WDL          Devices In Places Blood Pressure Cuff, Pulse Ox, and Oxy Mask      Interventions In Place Pillows and Pressure Redistribution Mattress    Possible Skin Injury No    Pictures Uploaded Into Epic N/A  Wound Consult Placed N/A  RN Wound Prevention Protocol Ordered No

## 2022-12-27 NOTE — OR NURSING
1047: pt's wife Mae phoned and updated on pt status in Recovery.   1151: Mae updated on pt status in Recovery and wait for room assignment.   1256: Mae updated on pt status in Recovery and transfer to T418.

## 2022-12-27 NOTE — ANESTHESIA POSTPROCEDURE EVALUATION
Patient: Michael Villarreal    Procedure Summary     Date: 12/27/22 Room / Location: [facility] 09 / SURGERY [facility]    Anesthesia Start: 0916 Anesthesia Stop: 1015    Procedure: LAPAROSCOPIC SLEEVE GASTRECTOMY (Abdomen) Diagnosis: (MORBID OBESITY)    Surgeons: Aubrey Barrera M.D. Responsible Provider: Ha Chase M.D.    Anesthesia Type: general ASA Status: 3          Final Anesthesia Type: general  Last vitals  BP   Blood Pressure : 119/69    Temp   36.4 °C (97.6 °F)    Pulse   73   Resp   16    SpO2   95 %      Anesthesia Post Evaluation    Patient location during evaluation: PACU  Patient participation: complete - patient participated  Level of consciousness: awake and alert    Airway patency: patent  Anesthetic complications: no  Cardiovascular status: hemodynamically stable  Respiratory status: acceptable and face mask  Hydration status: euvolemic    PONV: none          There were no known notable events for this encounter.

## 2022-12-28 ENCOUNTER — PHARMACY VISIT (OUTPATIENT)
Dept: PHARMACY | Facility: MEDICAL CENTER | Age: 67
End: 2022-12-28
Payer: COMMERCIAL

## 2022-12-28 VITALS
HEART RATE: 56 BPM | BODY MASS INDEX: 37.56 KG/M2 | RESPIRATION RATE: 18 BRPM | WEIGHT: 262.35 LBS | TEMPERATURE: 98.2 F | HEIGHT: 70 IN | SYSTOLIC BLOOD PRESSURE: 114 MMHG | OXYGEN SATURATION: 97 % | DIASTOLIC BLOOD PRESSURE: 52 MMHG

## 2022-12-28 LAB
ALBUMIN SERPL BCP-MCNC: 3.9 G/DL (ref 3.2–4.9)
ALBUMIN/GLOB SERPL: 1.7 G/DL
ALP SERPL-CCNC: 56 U/L (ref 30–99)
ALT SERPL-CCNC: 27 U/L (ref 2–50)
ANION GAP SERPL CALC-SCNC: 9 MMOL/L (ref 7–16)
AST SERPL-CCNC: 23 U/L (ref 12–45)
BILIRUB SERPL-MCNC: 0.6 MG/DL (ref 0.1–1.5)
BUN SERPL-MCNC: 9 MG/DL (ref 8–22)
CALCIUM ALBUM COR SERPL-MCNC: 9.1 MG/DL (ref 8.5–10.5)
CALCIUM SERPL-MCNC: 9 MG/DL (ref 8.5–10.5)
CHLORIDE SERPL-SCNC: 106 MMOL/L (ref 96–112)
CO2 SERPL-SCNC: 26 MMOL/L (ref 20–33)
CREAT SERPL-MCNC: 0.76 MG/DL (ref 0.5–1.4)
ERYTHROCYTE [DISTWIDTH] IN BLOOD BY AUTOMATED COUNT: 39.9 FL (ref 35.9–50)
GFR SERPLBLD CREATININE-BSD FMLA CKD-EPI: 98 ML/MIN/1.73 M 2
GLOBULIN SER CALC-MCNC: 2.3 G/DL (ref 1.9–3.5)
GLUCOSE SERPL-MCNC: 127 MG/DL (ref 65–99)
HCT VFR BLD AUTO: 44.8 % (ref 42–52)
HGB BLD-MCNC: 15.4 G/DL (ref 14–18)
MCH RBC QN AUTO: 30.4 PG (ref 27–33)
MCHC RBC AUTO-ENTMCNC: 34.4 G/DL (ref 33.7–35.3)
MCV RBC AUTO: 88.5 FL (ref 81.4–97.8)
PLATELET # BLD AUTO: 142 K/UL (ref 164–446)
PMV BLD AUTO: 13.2 FL (ref 9–12.9)
POTASSIUM SERPL-SCNC: 4.3 MMOL/L (ref 3.6–5.5)
PROT SERPL-MCNC: 6.2 G/DL (ref 6–8.2)
RBC # BLD AUTO: 5.06 M/UL (ref 4.7–6.1)
SODIUM SERPL-SCNC: 141 MMOL/L (ref 135–145)
WBC # BLD AUTO: 9.8 K/UL (ref 4.8–10.8)

## 2022-12-28 PROCEDURE — 80053 COMPREHEN METABOLIC PANEL: CPT

## 2022-12-28 PROCEDURE — 700101 HCHG RX REV CODE 250: Performed by: SURGERY

## 2022-12-28 PROCEDURE — 85027 COMPLETE CBC AUTOMATED: CPT

## 2022-12-28 PROCEDURE — 700111 HCHG RX REV CODE 636 W/ 250 OVERRIDE (IP): Performed by: SURGERY

## 2022-12-28 PROCEDURE — 700102 HCHG RX REV CODE 250 W/ 637 OVERRIDE(OP): Performed by: SURGERY

## 2022-12-28 PROCEDURE — 36415 COLL VENOUS BLD VENIPUNCTURE: CPT

## 2022-12-28 PROCEDURE — A9270 NON-COVERED ITEM OR SERVICE: HCPCS | Performed by: SURGERY

## 2022-12-28 RX ADMIN — KETOROLAC TROMETHAMINE 15 MG: 30 INJECTION, SOLUTION INTRAMUSCULAR; INTRAVENOUS at 06:26

## 2022-12-28 RX ADMIN — FAMOTIDINE 20 MG: 10 INJECTION, SOLUTION INTRAVENOUS at 06:26

## 2022-12-28 RX ADMIN — APIXABAN 5 MG: 5 TABLET, FILM COATED ORAL at 06:27

## 2022-12-28 RX ADMIN — ACETAMINOPHEN 1000 MG: 500 TABLET ORAL at 00:15

## 2022-12-28 RX ADMIN — PROPAFENONE HYDROCHLORIDE 150 MG: 150 TABLET, FILM COATED ORAL at 06:26

## 2022-12-28 RX ADMIN — KETOROLAC TROMETHAMINE 15 MG: 30 INJECTION, SOLUTION INTRAMUSCULAR; INTRAVENOUS at 00:14

## 2022-12-28 RX ADMIN — POTASSIUM CHLORIDE, DEXTROSE MONOHYDRATE AND SODIUM CHLORIDE: 150; 5; 450 INJECTION, SOLUTION INTRAVENOUS at 00:16

## 2022-12-28 RX ADMIN — METOPROLOL TARTRATE 25 MG: 25 TABLET, FILM COATED ORAL at 06:27

## 2022-12-28 RX ADMIN — LEVOTHYROXINE SODIUM 150 MCG: 0.15 TABLET ORAL at 06:27

## 2022-12-28 ASSESSMENT — PAIN DESCRIPTION - PAIN TYPE: TYPE: ACUTE PAIN

## 2022-12-28 NOTE — DISCHARGE SUMMARY
Discharge Summary    CHIEF COMPLAINT ON ADMISSION  No chief complaint on file.      Reason for Admission  Morbid obesity (HCC)     Admission Date  12/27/2022    CODE STATUS  Full Code    HPI & HOSPITAL COURSE  This is a 67 y.o. male here with morbid obesity status post laparoscopic sleeve gastrectomy.  Patient is doing well on postop day #1 he is ambulating well, he is tolerating clear liquid diet and pain is controlled with oral medication    Therefore, he is discharged in good and stable condition to home with close outpatient follow-up.    The patient recovered much more quickly than anticipated on admission.    Discharge Date  12/28/22    FOLLOW UP ITEMS POST DISCHARGE  none    DISCHARGE DIAGNOSES  Principal Problem:    Morbid obesity (HCC) POA: Yes  Active Problems:    Postoperative pain POA: Unknown  Resolved Problems:    * No resolved hospital problems. *      FOLLOW UP  Future Appointments   Date Time Provider Department Center   2/3/2023  8:00 AM Pedro Peoples M.D. [facility] None   2/6/2023  8:20 AM Uri Parks M.D. [facility] None     No follow-up provider specified.    MEDICATIONS ON DISCHARGE     Medication List        START taking these medications        Instructions   acetaminophen 650 MG CR tablet  Commonly known as: TYLENOL   Take 1 Tablet by mouth every 6 hours.  Dose: 650 mg     celecoxib 100 MG Caps  Commonly known as: CELEBREX   Take 1 Capsule by mouth 2 times a day. Take scheduled for 3 days then as needed for pain after  Dose: 100 mg     omeprazole 20 MG delayed-release capsule  Commonly known as: PRILOSEC   Doctor's comments: Open capsule and mix in 10cc H2O  Take 1 Capsule by mouth every day. Open capsule and mix in 10cc H2O  Dose: 20 mg     ondansetron 4 MG Tbdp  Commonly known as: ZOFRAN ODT   Take 1 Tablet by mouth every 6 hours as needed for Nausea/Vomiting.  Dose: 4 mg     oxyCODONE 5 MG/5ML solution  Commonly known as: ROXICODONE   Take 5 mL by mouth every four hours as needed for Severe Pain  for up to 4 days.  Dose: 5 mg     polyethylene glycol/lytes 17 g Pack  Commonly known as: MIRALAX   Take 1 Packet by mouth every day. While taking narcotics, hold if greater then 3 Bowel movements a day  Dose: 17 g            CONTINUE taking these medications        Instructions   Eliquis 5mg Tabs  Generic drug: apixaban   TAKE ONE TABLET BY MOUTH TWICE A DAY  Dose: 5 mg     levothyroxine 150 MCG Tabs  Commonly known as: SYNTHROID   TAKE ONE TABLET BY MOUTH EVERY MORNING ON AN EMPTY STOMACH  Dose: 150 mcg     metoprolol tartrate 25 MG Tabs  Commonly known as: LOPRESSOR   Take 1 Tablet by mouth 2 times a day.  Dose: 25 mg     propafenone 150 MG Tabs  Commonly known as: RYTHMOL   Take 1 Tablet by mouth every 8 hours.  Dose: 150 mg            STOP taking these medications      MAGNESIUM PO     metFORMIN  MG Tb24  Commonly known as: GLUCOPHAGE XR     potassium chloride SA 20 MEQ Tbcr  Commonly known as: Kdur     pravastatin 40 MG tablet  Commonly known as: PRAVACHOL     SUPER B COMPLEX PO     vitamin D 1000 Unit (25 mcg) Tabs  Commonly known as: cholecalciferol     vitamin e 400 UNIT Caps  Commonly known as: VITAMIN E              Allergies  Allergies   Allergen Reactions    Lipitor [Atorvastatin] Myalgia     Myalgias       DIET  Orders Placed This Encounter   Procedures    Diet Order Diet: Clear Liquid; Miscellaneous modifications: (optional): Bariatric     Standing Status:   Standing     Number of Occurrences:   1     Order Specific Question:   Diet:     Answer:   Clear Liquid [10]     Order Specific Question:   Miscellaneous modifications: (optional)     Answer:   Bariatric [3]       ACTIVITY  As tolerated.  Weight bearing as tolerated    CONSULTATIONS  none    PROCEDURES  12/27/22 - Laparoscopic Sleeve Gastrectomy    LABORATORY  Lab Results   Component Value Date    SODIUM 141 12/28/2022    POTASSIUM 4.3 12/28/2022    CHLORIDE 106 12/28/2022    CO2 26 12/28/2022    GLUCOSE 127 (H) 12/28/2022    BUN 9  12/28/2022    CREATININE 0.76 12/28/2022        Lab Results   Component Value Date    WBC 9.8 12/28/2022    HEMOGLOBIN 15.4 12/28/2022    HEMATOCRIT 44.8 12/28/2022    PLATELETCT 142 (L) 12/28/2022        Total time of the discharge process exceeds 20 minutes.

## 2022-12-28 NOTE — DISCHARGE INSTRUCTIONS
Sleeve Gastrectomy, Care After  This sheet gives you information about how to care for yourself after your procedure. Your health care provider may also give you more specific instructions. If you have problems or questions, contact your health care provider.  What can I expect after the procedure?  After the procedure, it is common to have:  Pain in the abdomen.  Decreased appetite.  Clear fluid leaking through the small tube (drain) that comes from your incision site.  Follow these instructions at home:  Medicines  Take over-the-counter and prescription medicines only as told by your health care provider.  Do not drive for 24 hours if you were given a sedative during your procedure.  Do not drive or use heavy machinery while taking prescription pain medicine.  Incision and drain care    Follow instructions from your health care provider about how to take care of your incisions. Make sure you:  Wash your hands with soap and water before and after you change your bandage (dressing). If soap and water are not available, use hand .  Change your dressing as told by your health care provider.  Leave stitches (sutures), skin glue, or adhesive strips in place. These skin closures may need to be in place for 2 weeks or longer. If adhesive strip edges start to loosen and curl up, you may trim the loose edges. Do not remove adhesive strips completely unless your health care provider tells you to do that.  Keep the area around your incisions and your drain clean and dry.  Check your incision areas every day for signs of infection. Check for:  Redness, swelling, or pain.  Fluid or blood.  Warmth.  Pus or a bad smell.  Empty your drain every day. Follow instructions from your health care provider about recording the amount of fluid that comes from your drain. Make note of any changes in the amount or appearance of the fluid.  Activity    Rest as told by your health care provider.  Avoid sitting for a long time without  moving. Get up to take short walks every 1-2 hours. This is important to improve blood flow and breathing. Ask for help if you feel weak or unsteady.  Return to your normal activities as told by your health care provider. Ask your health care provider what activities are safe for you.  Do not lift anything that is heavier than 10 lb (4.5 kg), or the limit that you are told, until your health care provider says that it is safe.  Avoid intense physical activity for as long as told by your health care provider.  Eating and drinking  Follow instructions from your health care provider about eating or drinking restrictions. You will be given instructions about the type, the size, and the timing of your meals.  Keep track of any foods that cause discomfort, such as bloating or cramping.  Eat healthy foods. Avoid foods that are high in fat or sugar.  Stop eating when you feel full.  Take supplements only as told by your health care provider.  Drink enough fluid to keep your urine pale yellow.  General instructions  Do not take baths, swim, or use a hot tub until your health care provider approves. Ask your health care provider if you may take showers. You may only be allowed to take sponge baths.  Do not use any products that contain nicotine or tobacco, such as cigarettes, e-cigarettes, and chewing tobacco. If you need help quitting, ask your health care provider.  Wear compression stockings as told by your health care provider. These stockings help to prevent blood clots and reduce swelling in your legs.  Do breathing exercises as told by your health care provider.  Keep all follow-up visits as told by your health care provider. This is important.  Contact a health care provider if you have:  Pain that gets worse or does not get better with medicine.  Redness, swelling, or pain around your incisions.  Fluid or blood coming from your incisions.  Incisions that feel warm to the touch.  Pus or a bad smell coming from your  incisions.  A fever or chills.  Problems with your drain.  Green or bad-smelling fluid leaking from your drain.  Get help right away if you have:  Trouble breathing.  Severe pain, especially in your legs.  Summary  After the procedure, it is common to have pain in the abdomen, decreased appetite, and clear fluid leaking from the drain in an incision site.  Take over-the-counter and prescription medicines only as told by your health care provider.  Follow instructions from your health care provider about how to take care of your incisions. Report any signs of infection to your health care provider.  After surgery, get up to take short walks every 1-2 hours. This is important to improve blood flow and breathing. Follow instructions from your health care provider about eating or drinking restrictions.  Get help right away if you have trouble breathing or severe pain, especially in your legs.  This information is not intended to replace advice given to you by your health care provider. Make sure you discuss any questions you have with your health care provider.  Document Released: 10/14/2010 Document Revised: 04/09/2020 Document Reviewed: 08/06/2019  [employer] Patient Education © 2020 [employer]

## 2022-12-28 NOTE — PROGRESS NOTES
"       S: Michael Villarreal  is a 67 y.o.  male admitted for morbid obesity s/p sleeve. Doing well, angelina PO, pain controlled, ambulating well.      O:  /52   Pulse (!) 56   Temp 36.8 °C (98.2 °F) (Temporal)   Resp 18   Ht 1.765 m (5' 9.5\")   Wt 119 kg (262 lb 5.6 oz)   SpO2 97%   Intake/Output                               12/26/22 0700 - 12/27/22 0659 (Not Admitted) 12/27/22 0700 - 12/28/22 0659 12/28/22 0700 - 12/29/22 0659     [id number] 8956-8198 Total [id number] 8141-7455 Total 4943-2024 8849-8256 Total                    Intake    P.O.  --  -- --  30  180 210  --  -- --    P.O. -- -- -- 30 180 210 -- -- --    I.V.  --  -- --  2524.1  -- 2524.1  --  -- --    Volume (mL) (lactated ringers infusion) -- -- -- 1500 -- 1500 -- -- --    Volume (mL) (dextrose 5 % and 0.45 % NaCl with KCl 20 mEq) -- -- -- 124.1 -- 124.1 -- -- --    Volume (mL) (lactated ringers infusion) -- -- -- 900 -- 900 -- -- --    Total Intake -- -- -- 2554.1 180 2734.1 -- -- --       Output    Urine  --  -- --  --  0 0  --  -- --    Number of Times Voided -- -- -- 1 x 2 x 3 x -- -- --    Urine Void (mL) -- -- -- -- 0 0 -- -- --    Stool  --  -- --  --  0 0  --  -- --    Number of Times Stooled -- -- -- 0 x 0 x 0 x -- -- --    Measurable Stool (mL) -- -- -- -- 0 0 -- -- --    Blood  --  -- --  20  -- 20  --  -- --    Est. Blood Loss -- -- -- 20 -- 20 -- -- --    Total Output -- -- -- 20 0 20 -- -- --       Net I/O     -- -- -- 2534.1 180 2714.1 -- -- --          Recent Labs     12/28/22  0347   SODIUM 141   POTASSIUM 4.3   CHLORIDE 106   CO2 26   GLUCOSE 127*   BUN 9   CREATININE 0.76   CALCIUM 9.0     Recent Labs     12/28/22 0347   WBC 9.8   RBC 5.06   HEMOGLOBIN 15.4   HEMATOCRIT 44.8   MCV 88.5   MCH 30.4   MCHC 34.4   RDW 39.9   PLATELETCT 142*   MPV 13.2*       Alert and Oriented x3, No Acute Distress  Normal Respiratory Effort  Abdomen soft, appropriately tender  Incisions/Bandages clean/dry/intact  Extremities warm and well " perfused    A/P:  Plan for DC home    Aubrey Barrera MD  [facility]

## 2022-12-28 NOTE — PROGRESS NOTES
Bedside report received.  Assessment complete.  A&O x 4. Patient calls appropriately.  Patient ambulates with SB assist.    Patient has 0/10 pain. Pain managed with prescribed medications.  Denies N&V. Tolerating gastric clear liquid diet.  X 5 lap sites with dermabond, approximated, LUIS.   + void, + flatus, - BM.  Patient denies SOB.  SCD's refused.    Review plan with of care with patient. Call light and personal belongings within reach. Hourly rounding in place. All needs met at this time.

## 2022-12-28 NOTE — PROGRESS NOTES
Patient discharged to home with wife and staff escort. IV discontinued. Prescriptions given to patient to patient via meds to beds, verbalized understanding, consent signed and in chart. Two of patients medications with instructions not to crush, patient and wife educated to call [facility] and discuss with pharmacist. Discharge instructions given regarding diet, activity limitations, incision and dressing care, follow up appointments, and when to seek medical attention.  Education provided, patient asked questions and verbalized understanding. Discharge paperwork signed and in chart. Patient is tolerating diet, stable when ambulating, and pain is well controlled. All belongings collected. No further questions or concerns at this time.

## 2023-01-10 PROCEDURE — RXMED WILLOW AMBULATORY MEDICATION CHARGE: Performed by: INTERNAL MEDICINE

## 2023-01-12 ENCOUNTER — PHARMACY VISIT (OUTPATIENT)
Dept: PHARMACY | Facility: MEDICAL CENTER | Age: 68
End: 2023-01-12
Payer: COMMERCIAL

## 2023-01-12 ENCOUNTER — APPOINTMENT (RX ONLY)
Dept: URBAN - METROPOLITAN AREA CLINIC 158 | Facility: CLINIC | Age: 68
Setting detail: DERMATOLOGY
End: 2023-01-12

## 2023-01-12 DIAGNOSIS — L50.1 IDIOPATHIC URTICARIA: ICD-10-CM | Status: IMPROVED

## 2023-01-12 PROCEDURE — ? OTHER

## 2023-01-12 PROCEDURE — 99213 OFFICE O/P EST LOW 20 MIN: CPT

## 2023-01-12 PROCEDURE — ? COUNSELING

## 2023-01-12 ASSESSMENT — LOCATION DETAILED DESCRIPTION DERM
LOCATION DETAILED: LEFT ANTERIOR DISTAL UPPER ARM
LOCATION DETAILED: RIGHT MEDIAL UPPER BACK
LOCATION DETAILED: LEFT PROXIMAL PRETIBIAL REGION
LOCATION DETAILED: RIGHT ANTERIOR DISTAL UPPER ARM
LOCATION DETAILED: RIGHT PROXIMAL PRETIBIAL REGION

## 2023-01-12 ASSESSMENT — LOCATION ZONE DERM
LOCATION ZONE: TRUNK
LOCATION ZONE: LEG
LOCATION ZONE: ARM

## 2023-01-12 ASSESSMENT — LOCATION SIMPLE DESCRIPTION DERM
LOCATION SIMPLE: RIGHT PRETIBIAL REGION
LOCATION SIMPLE: LEFT UPPER ARM
LOCATION SIMPLE: LEFT PRETIBIAL REGION
LOCATION SIMPLE: RIGHT UPPER ARM
LOCATION SIMPLE: RIGHT UPPER BACK

## 2023-01-12 NOTE — PROCEDURE: OTHER
Other (Free Text): Todays visit 01/12/23- Urticaria has improved with high dose antihistamines.  He has been using Benadryl 4 times a day. Recommended to switch back to either Allegra, Zyrtec, xyzal or cetirzine. He will slowly taper off antihistamines. He has not used TAC and does not need it at this time.  He discussed metformin with PCP and is staying on this medication at this time.\\n\\n10/13/22-Urticaria develops after a hot shower or increased body temperature. Continue with Allegra daily, but increase to 4 times a day for a few weeks, then slowly taper down. He noticed after starting metformin, the hives began. Advised to follow up with pcp to discuss medications. Will stop otc cortisone 10 and start TAC twice daily for 2 weeks then as needed for itch.
Detail Level: Zone
Render Risk Assessment In Note?: no
Note Text (......Xxx Chief Complaint.): This diagnosis correlates with the

## 2023-01-19 PROCEDURE — RXMED WILLOW AMBULATORY MEDICATION CHARGE: Performed by: SURGERY

## 2023-01-27 ENCOUNTER — PHARMACY VISIT (OUTPATIENT)
Dept: PHARMACY | Facility: MEDICAL CENTER | Age: 68
End: 2023-01-27
Payer: COMMERCIAL

## 2023-02-03 ENCOUNTER — OFFICE VISIT (OUTPATIENT)
Dept: CARDIOLOGY | Facility: MEDICAL CENTER | Age: 68
End: 2023-02-03
Payer: COMMERCIAL

## 2023-02-03 VITALS
HEIGHT: 70 IN | BODY MASS INDEX: 34.5 KG/M2 | HEART RATE: 64 BPM | RESPIRATION RATE: 14 BRPM | OXYGEN SATURATION: 96 % | DIASTOLIC BLOOD PRESSURE: 72 MMHG | SYSTOLIC BLOOD PRESSURE: 116 MMHG | WEIGHT: 241 LBS

## 2023-02-03 DIAGNOSIS — G47.33 OSA (OBSTRUCTIVE SLEEP APNEA): ICD-10-CM

## 2023-02-03 DIAGNOSIS — Z79.01 CHRONIC ANTICOAGULATION: ICD-10-CM

## 2023-02-03 DIAGNOSIS — I48.0 PAF (PAROXYSMAL ATRIAL FIBRILLATION) (HCC): Primary | ICD-10-CM

## 2023-02-03 DIAGNOSIS — E78.2 MIXED HYPERLIPIDEMIA: ICD-10-CM

## 2023-02-03 PROCEDURE — 99213 OFFICE O/P EST LOW 20 MIN: CPT | Performed by: INTERNAL MEDICINE

## 2023-02-03 RX ORDER — PROPAFENONE HYDROCHLORIDE 150 MG/1
150 TABLET, COATED ORAL 2 TIMES DAILY
Qty: 180 TABLET | Refills: 6 | Status: SHIPPED | OUTPATIENT
Start: 2023-02-03 | End: 2023-10-19 | Stop reason: SDUPTHER

## 2023-02-03 RX ORDER — PRAVASTATIN SODIUM 20 MG
20 TABLET ORAL NIGHTLY
Qty: 90 TABLET | Refills: 3 | Status: SHIPPED | OUTPATIENT
Start: 2023-02-03 | End: 2023-10-19 | Stop reason: SDUPTHER

## 2023-02-03 ASSESSMENT — FIBROSIS 4 INDEX: FIB4 SCORE: 2.09

## 2023-02-03 NOTE — PROGRESS NOTES
"CARDIOLOGY established PATIENT:    PCP: PATRICIA Ingram    1. PAF (paroxysmal atrial fibrillation) (HCC)    2. Mixed hyperlipidemia    3. FORREST (obstructive sleep apnea)    4. Chronic anticoagulation        Michael Villarreal here for pAF follow up.    Chief Complaint   Patient presents with    Atrial Fibrillation     F/v Dx: PAF (paroxysmal atrial fibrillation) (HCC)    Hyperlipidemia       History: Michael Villarreal is a 67 y.o. male with paroxysmal atrial fibrillation, chronic anticoagulation, hypothyroidism controlled, hypertension, dyslipidemia, ?? statin intolerance (atorva triggered A fib, on pravastatin now), FORREST not on capa, and remote smoker (quit > 30 yrs ago) is here for follow-up.  Last seen in our clinic by Dr. Mcgill 6/2022 and Dr. Parks 7/2022. A fib ablation decision postponed pending weight loss progress after his gastric sleeve. Lost 30 lbs since the sleeve 12/27/2022     A fib episodes about once every 6-8 weeks, usually lasts 3 hours, 150's bpm. No bleeding issues. Gastric sleeve surgery done 1/27/22 and had an A fib episode few days later.    This year, one episode thus far, and he takes an extra propafenone then. He usually takes it BID.    No upcoming surgeries.     Denies CP, SOB, LH, dizziness, syncope, lower extremity edema, orthopnea, PND.    Walks for exercise. Has blood work planned in a month with his surgical team.    Can not tolerate FORREST due to how he sleeps.    Not using celebrex.    Lipid panel 10/2022:   , , HDL 34    TTE 9/2022: Personally reviewed  Normal left ventricular size, thickness, systolic function, and   diastolic function.  The left ventricular ejection fraction is visually estimated to be 65%.      PE:  /72 (BP Location: Left arm, Patient Position: Sitting, BP Cuff Size: Adult)   Pulse 64   Resp 14   Ht 1.778 m (5' 10\")   Wt 109 kg (241 lb)   SpO2 96%   BMI 34.58 kg/m²     Gen: well  HEENT: Symmetric face. Anicteric sclerae. Moist " "mucus membranes  NECK: No JVD. No lymphadenopathy  CARDIAC: Regular, Normal S1, S2, No murmur  VASCULATURE: carotids are normal bilaterally without bruit  RESP: Clear to auscultation bilaterally  ABD: Soft, non-tender, non-distended  EXT: No edema, no clubbing or cyanosis. Dry skin.  SKIN: Warm and dry  NEURO: No gross deficits  PSYCH: Appropriate affect, participates in conversation    The 10-year ASCVD risk score (Stephy DK, et al., 2019) is: 16%    Past Medical History:   Diagnosis Date    Daytime sleepiness     Disorder of thyroid     High cholesterol     Hyperlipidemia     Hypertension     Obesity     Paroxysmal atrial fibrillation (HCC)     Snoring     Unspecified cataract      Past Surgical History:   Procedure Laterality Date    MI LAP, ALLEN RESTRICT PROC, LONGITUDINAL GAS* N/A 12/27/2022    Procedure: LAPAROSCOPIC SLEEVE GASTRECTOMY;  Surgeon: Aubrey Barrera M.D.;  Location: [facility];  Service: General    CATARACT PHACO WITH IOL  4/20/2015    Performed by Jakob Garcia M.D. at SURGERY SURGICAL [facility] ORS    CATARACT PHACO WITH IOL  4/1/2015    Performed by Jakob Garcia M.D. at SURGERY SAME DAY [facility] ORS    DUPUYTREN CONTRACTURE RELEASE  7/17/2012    Performed by RAFAEL CALVILLO at SURGERY [facility] ORS    HAND SURGERY      TONSILLECTOMY       Allergies   Allergen Reactions    Lipitor [Atorvastatin]      \"Triggered A fib\"     Outpatient Encounter Medications as of 2/3/2023   Medication Sig Dispense Refill    propafenone (RYTHMOL) 150 MG Tab Take 1 Tablet by mouth 2 times a day. 180 Tablet 6    pravastatin (PRAVACHOL) 20 MG Tab Take 1 Tablet by mouth every evening. 90 Tablet 3    celecoxib (CELEBREX) 100 MG Cap Take 1 Capsule by mouth 2 times a day. Take scheduled for 3 days then as needed for pain after 20 Capsule 0    omeprazole (PRILOSEC) 20 MG delayed-release capsule Take 1 Capsule by mouth every day. Open capsule and mix in 10cc H2O 30 Capsule 11    levothyroxine (SYNTHROID) 150 MCG Tab " TAKE ONE TABLET BY MOUTH EVERY MORNING ON AN EMPTY STOMACH 100 Tablet 2    apixaban (ELIQUIS) 5mg Tab TAKE ONE TABLET BY MOUTH TWICE A  Tablet 7    metoprolol tartrate (LOPRESSOR) 25 MG Tab Take 1 Tablet by mouth 2 times a day. 180 Tablet 7    [DISCONTINUED] acetaminophen (TYLENOL) 650 MG CR tablet Take 1 Tablet by mouth every 6 hours. (Patient not taking: Reported on 2/3/2023) 30 Tablet 0    [DISCONTINUED] ondansetron (ZOFRAN ODT) 4 MG TABLET DISPERSIBLE Take 1 Tablet by mouth every 6 hours as needed for Nausea/Vomiting. (Patient not taking: Reported on 2/3/2023) 18 Tablet 0    [DISCONTINUED] polyethylene glycol/lytes (MIRALAX) 17 g Pack Take 1 Packet by mouth every day. While taking narcotics, hold if greater then 3 Bowel movements a day (Patient not taking: Reported on 2/3/2023) 20 Each 0    [DISCONTINUED] propafenone (RYTHMOL) 150 MG Tab Take 1 Tablet by mouth every 8 hours. 270 Tablet 7     No facility-administered encounter medications on file as of 2/3/2023.     Social History     Socioeconomic History    Marital status:      Spouse name: Not on file    Number of children: Not on file    Years of education: Not on file    Highest education level: Not on file   Occupational History    Not on file   Tobacco Use    Smoking status: Former     Packs/day: 1.00     Years: 5.00     Pack years: 5.00     Types: Cigarettes     Quit date: 1984     Years since quittin.5    Smokeless tobacco: Former     Types: Chew     Quit date: 1984   Vaping Use    Vaping Use: Never used   Substance and Sexual Activity    Alcohol use: Not Currently     Comment: rarely    Drug use: No    Sexual activity: Yes     Partners: Female     Birth control/protection: None   Other Topics Concern    Not on file   Social History Narrative    Not on file     Social Determinants of Health     Financial Resource Strain: Not on file   Food Insecurity: Not on file   Transportation Needs: Not on file   Physical Activity: Not on  file   Stress: Not on file   Social Connections: Not on file   Intimate Partner Violence: Not on file   Housing Stability: Not on file     Family History   Problem Relation Age of Onset    Other Mother 68        kidney CA    Heart Disease Mother         CHF at 86         Studies    Lab Results   Component Value Date/Time    TSHULTRASEN 1.510 10/14/2022 0638        Lab Results   Component Value Date/Time    FREET4 0.77 08/12/2013 2349      Lab Results   Component Value Date/Time    HBA1C 5.6 09/25/2020 06:09 AM     Lab Results   Component Value Date/Time    CHOLSTRLTOT 170 10/14/2022 06:38 AM     (H) 10/14/2022 06:38 AM    HDL 34 (A) 10/14/2022 06:38 AM    TRIGLYCERIDE 141 10/14/2022 06:38 AM       Lab Results   Component Value Date/Time    SODIUM 141 12/28/2022 03:47 AM    POTASSIUM 4.3 12/28/2022 03:47 AM    CHLORIDE 106 12/28/2022 03:47 AM    CO2 26 12/28/2022 03:47 AM    GLUCOSE 127 (H) 12/28/2022 03:47 AM    BUN 9 12/28/2022 03:47 AM    CREATININE 0.76 12/28/2022 03:47 AM     Lab Results   Component Value Date/Time    ALKPHOSPHAT 56 12/28/2022 03:47 AM    ASTSGOT 23 12/28/2022 03:47 AM    ALTSGPT 27 12/28/2022 03:47 AM    TBILIRUBIN 0.6 12/28/2022 03:47 AM        Echocardiogram:  Results for orders placed or performed during the hospital encounter of 11/02/15   Echocardiogram Comp w/o Cont   Result Value Ref Range    Eject.Frac. MOD BP 68.48     Eject.Frac. MOD 4C 65.87     Eject.Frac. MOD 2C 54.51          Assessment and Recommendations:    Problem List Items Addressed This Visit       Hyperlipidemia    Relevant Medications    propafenone (RYTHMOL) 150 MG Tab    pravastatin (PRAVACHOL) 20 MG Tab    Other Relevant Orders    Lipid Profile    PAF (paroxysmal atrial fibrillation) (HCC) - Primary    Relevant Medications    propafenone (RYTHMOL) 150 MG Tab    pravastatin (PRAVACHOL) 20 MG Tab    Chronic anticoagulation    FORREST (obstructive sleep apnea)     Mr. Villarreal seems to be doing well from a cardiovascular  standpoint.  No medication changes today, he will discuss with EP in his upcoming appointment as to when they will plan attempting atrial fibrillation at ablation procedure after continued weight loss strategy since his gastric sleeve December 2022.    No bleeding concerns, recommend at least twice a year CBC and basic metabolic panel due to him being on chronic anticoagulation.    Plan to repeat lipid panel, advised him to get it checked whenever he has any upcoming blood work with any other team members.  He is on pravastatin 20mg.  Target LDL less than 100 and triglyceride less than 150. Qs of 10/2022, his TG was at goal, LDL almost at goal but well controlled.  I anticipate the numbers will be better after his recent weight loss.    Thank you for the opportunity to be involved in Michael Villarreal 's care; and please reach out with any questions or concerns.    Return in about 6 months (around 8/3/2023).    Pedro Peoples MD, MPH [facility]  Interventional Cardiologist  [facility]   of Clinical Internal Medicine - [facility]    ~ Portions of this note were completed using voice recognition software (Dragon Naturally speaking software) . Occasional transcription errors may have escaped proof reading. I have made every reasonable attempt to correct obvious errors, but I expect that there are errors of grammar and possibly content that I did not discover before finalizing the note. ~

## 2023-02-05 PROCEDURE — RXMED WILLOW AMBULATORY MEDICATION CHARGE: Performed by: NURSE PRACTITIONER

## 2023-02-06 ENCOUNTER — OFFICE VISIT (OUTPATIENT)
Dept: CARDIOLOGY | Facility: MEDICAL CENTER | Age: 68
End: 2023-02-06
Payer: COMMERCIAL

## 2023-02-06 VITALS
WEIGHT: 239 LBS | HEIGHT: 70 IN | SYSTOLIC BLOOD PRESSURE: 124 MMHG | BODY MASS INDEX: 34.22 KG/M2 | DIASTOLIC BLOOD PRESSURE: 82 MMHG | OXYGEN SATURATION: 96 % | RESPIRATION RATE: 14 BRPM | HEART RATE: 62 BPM

## 2023-02-06 DIAGNOSIS — I48.0 PAF (PAROXYSMAL ATRIAL FIBRILLATION) (HCC): ICD-10-CM

## 2023-02-06 PROCEDURE — 99214 OFFICE O/P EST MOD 30 MIN: CPT | Performed by: INTERNAL MEDICINE

## 2023-02-06 PROCEDURE — 93000 ELECTROCARDIOGRAM COMPLETE: CPT | Performed by: INTERNAL MEDICINE

## 2023-02-06 ASSESSMENT — FIBROSIS 4 INDEX: FIB4 SCORE: 2.09

## 2023-02-06 NOTE — PROGRESS NOTES
Arrhythmia Clinic Note (Established patient)    DOS: 2/6/2023    Chief complaint/Reason for consult: Afib    Interval History: 68 y/o M with pAF on Rhythmol. Had gastric sleeve, lost 30 lbs. 2 episodes Afib this year. Wants to do ablation later this year.     ROS (+ highlighted in bold):  Constitutional: Fevers/chills/fatigue/weightloss  HEENT: Blurry vision/eye pain/sore throat/hearing loss  Respiratory: Shortness of breath/cough  Cardiovascular: Chest pain/palpitations/edema/orthopnea/syncope  GI: Nausea/vomitting/diarrhea  MSK: Arthralgias/myagias/muscle weakness  Skin: Rash/sores  Neurological: Numbness/tremors/vertigo  Endocrine: Excessive thirst/polyuria/cold intolerance/heat intolerance  Psych: Depression/anxiety    Past Medical History:   Diagnosis Date    Daytime sleepiness     Disorder of thyroid     High cholesterol     Hyperlipidemia     Hypertension     Obesity     Paroxysmal atrial fibrillation (HCC)     Snoring     Unspecified cataract        Past Surgical History:   Procedure Laterality Date    [place] LAP, ALLEN RESTRICT PROC, LONGITUDINAL GAS* N/A 12/27/2022    Procedure: LAPAROSCOPIC SLEEVE GASTRECTOMY;  Surgeon: Aubrey Barrera M.D.;  Location: SURGERY [facility];  Service: General    CATARACT PHACO WITH IOL  4/20/2015    Performed by Jakob Garcia M.D. at [facility]    CATARACT PHACO WITH IOL  4/1/2015    Performed by Jakob Garcia M.D. at SURGERY SAME DAY [facility] ORS    DUPUYTREN CONTRACTURE RELEASE  7/17/2012    Performed by RAFAEL CALVILLO at SURGERY [facility] ORS    HAND SURGERY      TONSILLECTOMY         Social History     Socioeconomic History    Marital status:      Spouse name: Not on file    Number of children: Not on file    Years of education: Not on file    Highest education level: Not on file   Occupational History    Not on file   Tobacco Use    Smoking status: Former     Packs/day: 1.00     Years: 5.00     Pack years: 5.00     Types: Cigarettes     Quit date:  "1984     Years since quittin.5    Smokeless tobacco: Former     Types: Chew     Quit date: 1984   Vaping Use    Vaping Use: Never used   Substance and Sexual Activity    Alcohol use: Not Currently     Comment: rarely    Drug use: No    Sexual activity: Yes     Partners: Female     Birth control/protection: None   Other Topics Concern    Not on file   Social History Narrative    Not on file     Social Determinants of Health     Financial Resource Strain: Not on file   Food Insecurity: Not on file   Transportation Needs: Not on file   Physical Activity: Not on file   Stress: Not on file   Social Connections: Not on file   Intimate Partner Violence: Not on file   Housing Stability: Not on file       Family History   Problem Relation Age of Onset    Other Mother 68        kidney CA    Heart Disease Mother         CHF at 86       Allergies   Allergen Reactions    Lipitor [Atorvastatin]      \"Triggered A fib\"       Current Outpatient Medications   Medication Sig Dispense Refill    B Complex Vitamins (VITAMIN-B COMPLEX PO) Complex B   1 chewable per day      VITAMIN D PO Take  by mouth.      CALCIUM PO Take  by mouth.      metFORMIN (GLUCOPHAGE) 500 MG Tab Take 500 mg by mouth 2 times a day with meals.      POTASSIUM PO Take  by mouth.      propafenone (RYTHMOL) 150 MG Tab Take 1 Tablet by mouth 2 times a day. 180 Tablet 6    pravastatin (PRAVACHOL) 20 MG Tab Take 1 Tablet by mouth every evening. 90 Tablet 3    omeprazole (PRILOSEC) 20 MG delayed-release capsule Take 1 Capsule by mouth every day. Open capsule and mix in 10cc H2O 30 Capsule 11    levothyroxine (SYNTHROID) 150 MCG Tab TAKE ONE TABLET BY MOUTH EVERY MORNING ON AN EMPTY STOMACH 100 Tablet 2    apixaban (ELIQUIS) 5mg Tab TAKE ONE TABLET BY MOUTH TWICE A  Tablet 7    metoprolol tartrate (LOPRESSOR) 25 MG Tab Take 1 Tablet by mouth 2 times a day. 180 Tablet 7     No current facility-administered medications for this visit.       Physical " "Exam:  Vitals:    02/06/23 0812   BP: 124/82   BP Location: Left arm   Patient Position: Sitting   BP Cuff Size: Adult   Pulse: 62   Resp: 14   SpO2: 96%   Weight: 108 kg (239 lb)   Height: 1.778 m (5' 10\")     General appearance: NAD, conversant   Eyes: anicteric sclerae, moist conjunctivae; no lid-lag; PERRLA  HENT: Atraumatic; oropharynx clear with moist mucous membranes and no mucosal ulcerations; normal hard and soft palate  Neck: Trachea midline; FROM, supple, no thyromegaly or lymphadenopathy  Lungs: CTA, with normal respiratory effort and no intercostal retractions  CV: RRR, no MRGs, no JVD  Abdomen: Soft, non-tender; no masses or HSM  Extremities: No peripheral edema or extremity lymphadenopathy  Skin: Normal temperature, turgor and texture; no rash, ulcers or subcutaneous nodules  Psych: Appropriate affect, alert and oriented to person, place and time    Data:  Lipids:   Lab Results   Component Value Date/Time    CHOLSTRLTOT 170 10/14/2022 06:38 AM    TRIGLYCERIDE 141 10/14/2022 06:38 AM    HDL 34 (A) 10/14/2022 06:38 AM     (H) 10/14/2022 06:38 AM        BMP:  Lab Results   Component Value Date/Time    SODIUM 141 12/28/2022 0347    POTASSIUM 4.3 12/28/2022 0347    CHLORIDE 106 12/28/2022 0347    CO2 26 12/28/2022 0347    GLUCOSE 127 (H) 12/28/2022 0347    BUN 9 12/28/2022 0347    CREATININE 0.76 12/28/2022 0347    CALCIUM 9.0 12/28/2022 0347    ANION 9.0 12/28/2022 0347        TSH:   Lab Results   Component Value Date/Time    TSHULTRASEN 1.510 10/14/2022 0638        THYROXINE (T4):   No results found for: NICOLA     CBC:   Lab Results   Component Value Date/Time    WBC 9.8 12/28/2022 03:47 AM    RBC 5.06 12/28/2022 03:47 AM    HEMOGLOBIN 15.4 12/28/2022 03:47 AM    HEMATOCRIT 44.8 12/28/2022 03:47 AM    MCV 88.5 12/28/2022 03:47 AM    MCH 30.4 12/28/2022 03:47 AM    MCHC 34.4 12/28/2022 03:47 AM    RDW 39.9 12/28/2022 03:47 AM    PLATELETCT 142 (L) 12/28/2022 03:47 AM    MPV 13.2 (H) 12/28/2022 " 03:47 AM    NEUTSPOLYS 53.20 10/14/2022 06:38 AM    LYMPHOCYTES 30.30 10/14/2022 06:38 AM    MONOCYTES 8.00 10/14/2022 06:38 AM    EOSINOPHILS 5.00 10/14/2022 06:38 AM    BASOPHILS 2.10 (H) 10/14/2022 06:38 AM    IMMGRAN 1.40 (H) 10/14/2022 06:38 AM    NRBC 0.00 10/14/2022 06:38 AM    NEUTS 3.01 10/14/2022 06:38 AM    LYMPHS 1.71 10/14/2022 06:38 AM    MONOS 0.45 10/14/2022 06:38 AM    EOS 0.28 10/14/2022 06:38 AM    BASO 0.12 10/14/2022 06:38 AM    IMMGRANAB 0.08 10/14/2022 06:38 AM    NRBCAB 0.00 10/14/2022 06:38 AM        CBC w/o DIFF  Lab Results   Component Value Date/Time    WBC 9.8 12/28/2022 03:47 AM    RBC 5.06 12/28/2022 03:47 AM    HEMOGLOBIN 15.4 12/28/2022 03:47 AM    MCV 88.5 12/28/2022 03:47 AM    MCH 30.4 12/28/2022 03:47 AM    MCHC 34.4 12/28/2022 03:47 AM    RDW 39.9 12/28/2022 03:47 AM    MPV 13.2 (H) 12/28/2022 03:47 AM         EKG interpreted by me: NSR    Impression/Plan:  1. PAF (paroxysmal atrial fibrillation) ([facility])  EKG        pAF  High risk medication management  Hypercoagulable state due to afib    - Continue Rhythmol ECG reviewed  - Continue Eliquis labs reviewed  - Pt wants ablation but later in the year    FV 6 months    Uri Parks MD  Cardiac Electrophysiology

## 2023-02-07 ENCOUNTER — PHARMACY VISIT (OUTPATIENT)
Dept: PHARMACY | Facility: MEDICAL CENTER | Age: 68
End: 2023-02-07
Payer: COMMERCIAL

## 2023-02-14 LAB — EKG IMPRESSION: NORMAL

## 2023-02-20 ENCOUNTER — PHARMACY VISIT (OUTPATIENT)
Dept: PHARMACY | Facility: MEDICAL CENTER | Age: 68
End: 2023-02-20
Payer: COMMERCIAL

## 2023-02-20 PROCEDURE — RXMED WILLOW AMBULATORY MEDICATION CHARGE: Performed by: SURGERY

## 2023-03-07 PROCEDURE — RXMED WILLOW AMBULATORY MEDICATION CHARGE: Performed by: INTERNAL MEDICINE

## 2023-03-09 ENCOUNTER — PHARMACY VISIT (OUTPATIENT)
Dept: PHARMACY | Facility: MEDICAL CENTER | Age: 68
End: 2023-03-09
Payer: COMMERCIAL

## 2023-03-20 PROCEDURE — RXMED WILLOW AMBULATORY MEDICATION CHARGE: Performed by: INTERNAL MEDICINE

## 2023-03-21 ENCOUNTER — PHARMACY VISIT (OUTPATIENT)
Dept: PHARMACY | Facility: MEDICAL CENTER | Age: 68
End: 2023-03-21
Payer: COMMERCIAL

## 2023-04-04 ENCOUNTER — PHARMACY VISIT (OUTPATIENT)
Dept: PHARMACY | Facility: MEDICAL CENTER | Age: 68
End: 2023-04-04
Payer: COMMERCIAL

## 2023-04-04 PROCEDURE — RXMED WILLOW AMBULATORY MEDICATION CHARGE: Performed by: INTERNAL MEDICINE

## 2023-04-05 ENCOUNTER — HOSPITAL ENCOUNTER (OUTPATIENT)
Dept: LAB | Facility: MEDICAL CENTER | Age: 68
End: 2023-04-05
Attending: INTERNAL MEDICINE
Payer: COMMERCIAL

## 2023-04-05 ENCOUNTER — HOSPITAL ENCOUNTER (OUTPATIENT)
Dept: LAB | Facility: MEDICAL CENTER | Age: 68
End: 2023-04-05
Attending: CLINICAL NURSE SPECIALIST
Payer: COMMERCIAL

## 2023-04-05 LAB
25(OH)D3 SERPL-MCNC: 67 NG/ML (ref 30–100)
ALBUMIN SERPL BCP-MCNC: 3.9 G/DL (ref 3.2–4.9)
ALBUMIN/GLOB SERPL: 1.3 G/DL
ALP SERPL-CCNC: 82 U/L (ref 30–99)
ALT SERPL-CCNC: 30 U/L (ref 2–50)
ANION GAP SERPL CALC-SCNC: 11 MMOL/L (ref 7–16)
AST SERPL-CCNC: 25 U/L (ref 12–45)
BASOPHILS # BLD AUTO: 1.3 % (ref 0–1.8)
BASOPHILS # BLD: 0.07 K/UL (ref 0–0.12)
BILIRUB SERPL-MCNC: 0.7 MG/DL (ref 0.1–1.5)
BUN SERPL-MCNC: 15 MG/DL (ref 8–22)
CALCIUM ALBUM COR SERPL-MCNC: 9.7 MG/DL (ref 8.5–10.5)
CALCIUM SERPL-MCNC: 9.6 MG/DL (ref 8.5–10.5)
CHLORIDE SERPL-SCNC: 110 MMOL/L (ref 96–112)
CHOLEST SERPL-MCNC: 147 MG/DL (ref 100–199)
CO2 SERPL-SCNC: 27 MMOL/L (ref 20–33)
CREAT SERPL-MCNC: 0.59 MG/DL (ref 0.5–1.4)
EOSINOPHIL # BLD AUTO: 0.07 K/UL (ref 0–0.51)
EOSINOPHIL NFR BLD: 1.3 % (ref 0–6.9)
ERYTHROCYTE [DISTWIDTH] IN BLOOD BY AUTOMATED COUNT: 46 FL (ref 35.9–50)
FASTING STATUS PATIENT QL REPORTED: NORMAL
FERRITIN SERPL-MCNC: 173 NG/ML (ref 22–322)
FOLATE SERPL-MCNC: 12.3 NG/ML
GFR SERPLBLD CREATININE-BSD FMLA CKD-EPI: 106 ML/MIN/1.73 M 2
GLOBULIN SER CALC-MCNC: 3 G/DL (ref 1.9–3.5)
GLUCOSE SERPL-MCNC: 99 MG/DL (ref 65–99)
HCT VFR BLD AUTO: 47.4 % (ref 42–52)
HDLC SERPL-MCNC: 40 MG/DL
HGB BLD-MCNC: 16 G/DL (ref 14–18)
IMM GRANULOCYTES # BLD AUTO: 0.04 K/UL (ref 0–0.11)
IMM GRANULOCYTES NFR BLD AUTO: 0.8 % (ref 0–0.9)
IRON SATN MFR SERPL: 36 % (ref 15–55)
IRON SERPL-MCNC: 102 UG/DL (ref 50–180)
LDLC SERPL CALC-MCNC: 85 MG/DL
LYMPHOCYTES # BLD AUTO: 1.49 K/UL (ref 1–4.8)
LYMPHOCYTES NFR BLD: 28.1 % (ref 22–41)
MCH RBC QN AUTO: 31 PG (ref 27–33)
MCHC RBC AUTO-ENTMCNC: 33.8 G/DL (ref 33.7–35.3)
MCV RBC AUTO: 91.9 FL (ref 81.4–97.8)
MONOCYTES # BLD AUTO: 0.42 K/UL (ref 0–0.85)
MONOCYTES NFR BLD AUTO: 7.9 % (ref 0–13.4)
NEUTROPHILS # BLD AUTO: 3.22 K/UL (ref 1.82–7.42)
NEUTROPHILS NFR BLD: 60.6 % (ref 44–72)
NRBC # BLD AUTO: 0 K/UL
NRBC BLD-RTO: 0 /100 WBC
PLATELET # BLD AUTO: 129 K/UL (ref 164–446)
PMV BLD AUTO: 14.4 FL (ref 9–12.9)
POTASSIUM SERPL-SCNC: 4.1 MMOL/L (ref 3.6–5.5)
PROT SERPL-MCNC: 6.9 G/DL (ref 6–8.2)
RBC # BLD AUTO: 5.16 M/UL (ref 4.7–6.1)
SODIUM SERPL-SCNC: 148 MMOL/L (ref 135–145)
TIBC SERPL-MCNC: 280 UG/DL (ref 250–450)
TRIGL SERPL-MCNC: 109 MG/DL (ref 0–149)
UIBC SERPL-MCNC: 178 UG/DL (ref 110–370)
VIT B12 SERPL-MCNC: 876 PG/ML (ref 211–911)
WBC # BLD AUTO: 5.3 K/UL (ref 4.8–10.8)

## 2023-04-05 PROCEDURE — 83550 IRON BINDING TEST: CPT

## 2023-04-05 PROCEDURE — 80061 LIPID PANEL: CPT

## 2023-04-05 PROCEDURE — 82728 ASSAY OF FERRITIN: CPT

## 2023-04-05 PROCEDURE — 82607 VITAMIN B-12: CPT

## 2023-04-05 PROCEDURE — 82306 VITAMIN D 25 HYDROXY: CPT

## 2023-04-05 PROCEDURE — 84425 ASSAY OF VITAMIN B-1: CPT

## 2023-04-05 PROCEDURE — 83540 ASSAY OF IRON: CPT

## 2023-04-05 PROCEDURE — 80053 COMPREHEN METABOLIC PANEL: CPT

## 2023-04-05 PROCEDURE — 82746 ASSAY OF FOLIC ACID SERUM: CPT

## 2023-04-05 PROCEDURE — 36415 COLL VENOUS BLD VENIPUNCTURE: CPT

## 2023-04-05 PROCEDURE — 85025 COMPLETE CBC W/AUTO DIFF WBC: CPT

## 2023-04-09 LAB — VIT B1 BLD-MCNC: 205 NMOL/L (ref 70–180)

## 2023-04-12 ENCOUNTER — OFFICE VISIT (OUTPATIENT)
Dept: MEDICAL GROUP | Facility: PHYSICIAN GROUP | Age: 68
End: 2023-04-12
Payer: COMMERCIAL

## 2023-04-12 VITALS
OXYGEN SATURATION: 97 % | WEIGHT: 228.38 LBS | HEART RATE: 62 BPM | SYSTOLIC BLOOD PRESSURE: 110 MMHG | RESPIRATION RATE: 20 BRPM | TEMPERATURE: 96.8 F | HEIGHT: 70 IN | DIASTOLIC BLOOD PRESSURE: 62 MMHG | BODY MASS INDEX: 32.69 KG/M2

## 2023-04-12 DIAGNOSIS — E66.09 CLASS 1 OBESITY DUE TO EXCESS CALORIES WITH SERIOUS COMORBIDITY AND BODY MASS INDEX (BMI) OF 32.0 TO 32.9 IN ADULT: ICD-10-CM

## 2023-04-12 DIAGNOSIS — Z12.11 COLON CANCER SCREENING: ICD-10-CM

## 2023-04-12 DIAGNOSIS — E78.2 MIXED HYPERLIPIDEMIA: ICD-10-CM

## 2023-04-12 PROBLEM — G89.18 POSTOPERATIVE PAIN: Status: RESOLVED | Noted: 2022-12-27 | Resolved: 2023-04-12

## 2023-04-12 PROBLEM — R12 HEARTBURN: Status: ACTIVE | Noted: 2023-04-11

## 2023-04-12 PROBLEM — E66.811 CLASS 1 OBESITY DUE TO EXCESS CALORIES WITH SERIOUS COMORBIDITY AND BODY MASS INDEX (BMI) OF 32.0 TO 32.9 IN ADULT: Status: ACTIVE | Noted: 2022-12-27

## 2023-04-12 PROCEDURE — 99214 OFFICE O/P EST MOD 30 MIN: CPT | Performed by: NURSE PRACTITIONER

## 2023-04-12 ASSESSMENT — PATIENT HEALTH QUESTIONNAIRE - PHQ9: CLINICAL INTERPRETATION OF PHQ2 SCORE: 0

## 2023-04-12 ASSESSMENT — FIBROSIS 4 INDEX: FIB4 SCORE: 2.37

## 2023-04-12 NOTE — ASSESSMENT & PLAN NOTE
Chronic and improving. He got a gastric sleeve done in December 2022. He states that he has lost about 70 pounds since then. He states that his diet and exercise has improved since the procedure.

## 2023-04-12 NOTE — ASSESSMENT & PLAN NOTE
"Chronic, improving.  Currently taking Pravastatin 20 mg as directed.  Denies side effects of the medication--no myalgias or abdominal pain.  10-year cardiac risk ASCVD score: 12.3%  Reports diet is \"okay\".   He is following a low-cholesterol diet.  He is exercising regularly.  He is not taking ASA daily.  He denies dizziness, claudication, or chest pain.     10/14/22 06:38 04/05/23 06:26   Cholesterol,Tot 170 147   Triglycerides 141 109   HDL 34 ! 40    (H) 85     "

## 2023-04-12 NOTE — PROGRESS NOTES
"Subjective  Chief Complaint  Lab Results    History of Present Illness  Michael Villarreal is a 67 y.o. male. This established patient is here today to go over his recent lab results.    Class 1 obesity due to excess calories with serious comorbidity and body mass index (BMI) of 32.0 to 32.9 in adult  Chronic and improving. He got a gastric sleeve done in December 2022. He states that he has lost about 70 pounds since then. He states that his diet and exercise has improved since the procedure.    Hyperlipidemia  Chronic, improving.  Currently taking Pravastatin 20 mg as directed.  Denies side effects of the medication--no myalgias or abdominal pain.  10-year cardiac risk ASCVD score: 12.3%  Reports diet is \"okay\".   He is following a low-cholesterol diet.  He is exercising regularly.  He is not taking ASA daily.  He denies dizziness, claudication, or chest pain.     10/14/22 06:38 04/05/23 06:26   Cholesterol,Tot 170 147   Triglycerides 141 109   HDL 34 ! 40    (H) 85     Past Medical History    Allergies: Lipitor [atorvastatin]  Past Medical History:   Diagnosis Date    Daytime sleepiness     Disorder of thyroid     High cholesterol     Hyperlipidemia     Hypertension     Obesity     Paroxysmal atrial fibrillation (HCC)     Postoperative pain 12/27/2022    Snoring     Unspecified cataract      Past Surgical History:   Procedure Laterality Date    [place] LAP, ALLEN RESTRICT PROC, LONGITUDINAL GAS* N/A 12/27/2022    Procedure: LAPAROSCOPIC SLEEVE GASTRECTOMY;  Surgeon: Aubrey Barrera M.D.;  Location: [facility];  Service: General    CATARACT PHACO WITH IOL  4/20/2015    Performed by Jakob Garcia M.D. at SURGERY [facility] ORS    CATARACT PHACO WITH IOL  4/1/2015    Performed by Jakob Garcia M.D. at SURGERY SAME DAY [facility] ORS    DUPUYTREN CONTRACTURE RELEASE  7/17/2012    Performed by RAFAEL CALVILLO at SURGERY [facility] ORS    HAND SURGERY      TONSILLECTOMY       Current Outpatient Medications " Ordered in [facility]   Medication Sig Dispense Refill    B Complex Vitamins (VITAMIN-B COMPLEX PO) Complex B   1 chewable per day      VITAMIN D PO Take  by mouth.      CALCIUM PO Take  by mouth.      metFORMIN (GLUCOPHAGE) 500 MG Tab Take 500 mg by mouth 2 times a day with meals.      POTASSIUM PO Take  by mouth.      propafenone (RYTHMOL) 150 MG Tab Take 1 Tablet by mouth 2 times a day. 180 Tablet 6    pravastatin (PRAVACHOL) 20 MG Tab Take 1 Tablet by mouth every evening. 90 Tablet 3    omeprazole (PRILOSEC) 20 MG delayed-release capsule Take 1 Capsule by mouth every day. Open capsule and mix in 10cc H2O 30 Capsule 11    levothyroxine (SYNTHROID) 150 MCG Tab TAKE ONE TABLET BY MOUTH EVERY MORNING ON AN EMPTY STOMACH 100 Tablet 2    apixaban (ELIQUIS) 5mg Tab TAKE ONE TABLET BY MOUTH TWICE A  Tablet 7    metoprolol tartrate (LOPRESSOR) 25 MG Tab Take 1 Tablet by mouth 2 times a day. 180 Tablet 7     No current Epic-ordered facility-administered medications on file.     Family History:    Family History   Problem Relation Age of Onset    Other Mother 68        kidney CA    Heart Disease Mother         CHF at 86      Personal/Social History:    Social History     Tobacco Use    Smoking status: Former     Packs/day: 1.00     Years: 5.00     Pack years: 5.00     Types: Cigarettes     Quit date: 1984     Years since quittin.7    Smokeless tobacco: Former     Types: Chew     Quit date: 1984   Vaping Use    Vaping Use: Never used   Substance Use Topics    Alcohol use: Not Currently     Comment: rarely    Drug use: No     Social History     Social History Narrative    Not on file      Review of Systems:   General: Negative for fever/chills and unexpected weight change.   Respiratory:  Negative for cough and dyspnea.    Cardiovascular:  Negative for chest pain and palpitations.  Musculoskeletal:  Negative for myalgias.   Skin:  Negative for rash.     Objective  Physical Exam:   /62 (BP Location: Left  "arm, Patient Position: Sitting, BP Cuff Size: Adult)   Pulse 62   Temp 36 °C (96.8 °F) (Temporal)   Resp 20   Ht 1.778 m (5' 10\")   Wt 104 kg (228 lb 6 oz)   SpO2 97%  Body mass index is 32.77 kg/m².  General:  Alert and oriented.  Well appearing.  NAD  Neck: Supple without JVD. No lymphadenopathy.  Pulmonary:  Normal effort.  Clear to ausculation without rales, ronchi, or wheezing.  Cardiovascular:  Regular rate and rhythm without murmur, rubs or gallop.   Skin:  Warm and dry.  No obvious lesions.  Musculoskeletal:  No extremity cyanosis, clubbing, or edema.      Assessment/Plan  1. Class 1 obesity due to excess calories with serious comorbidity and body mass index (BMI) of 32.0 to 32.9 in adult  Chronic and ongoing.  Educated on a healthy diet and exercise.    2. Mixed hyperlipidemia  Chronic and improving.  Continue to take Pravastatin 20 mg every evening.  Educated on a healthy diet and exercise.    3. Colon cancer screening  - Referral to GI for Colonoscopy      Health Maintenance: Discussed with patient.    Return if symptoms worsen or fail to improve.    Discussed that the patient carries some responsibility in management of their health care.    Please note that this dictation was created using voice recognition software. I have made every reasonable attempt to correct obvious errors, but I expect that there are errors of grammar and possibly content that I did not discover before finalizing the note.    CHARO Morelos  [facility]  "

## 2023-04-27 PROCEDURE — RXMED WILLOW AMBULATORY MEDICATION CHARGE: Performed by: NURSE PRACTITIONER

## 2023-04-27 PROCEDURE — RXMED WILLOW AMBULATORY MEDICATION CHARGE: Performed by: INTERNAL MEDICINE

## 2023-05-03 ENCOUNTER — PHARMACY VISIT (OUTPATIENT)
Dept: PHARMACY | Facility: MEDICAL CENTER | Age: 68
End: 2023-05-03
Payer: COMMERCIAL

## 2023-05-31 ENCOUNTER — PHARMACY VISIT (OUTPATIENT)
Dept: PHARMACY | Facility: MEDICAL CENTER | Age: 68
End: 2023-05-31
Payer: COMMERCIAL

## 2023-05-31 PROCEDURE — RXMED WILLOW AMBULATORY MEDICATION CHARGE: Performed by: INTERNAL MEDICINE

## 2023-06-19 PROCEDURE — RXMED WILLOW AMBULATORY MEDICATION CHARGE: Performed by: INTERNAL MEDICINE

## 2023-06-20 ENCOUNTER — PHARMACY VISIT (OUTPATIENT)
Dept: PHARMACY | Facility: MEDICAL CENTER | Age: 68
End: 2023-06-20
Payer: COMMERCIAL

## 2023-07-06 ENCOUNTER — PHARMACY VISIT (OUTPATIENT)
Dept: PHARMACY | Facility: MEDICAL CENTER | Age: 68
End: 2023-07-06
Payer: COMMERCIAL

## 2023-07-06 PROCEDURE — RXMED WILLOW AMBULATORY MEDICATION CHARGE: Performed by: INTERNAL MEDICINE

## 2023-07-26 ENCOUNTER — HOSPITAL ENCOUNTER (OUTPATIENT)
Dept: LAB | Facility: MEDICAL CENTER | Age: 68
End: 2023-07-26
Attending: CLINICAL NURSE SPECIALIST
Payer: COMMERCIAL

## 2023-07-26 LAB
25(OH)D3 SERPL-MCNC: 66 NG/ML (ref 30–100)
ALBUMIN SERPL BCP-MCNC: 4.3 G/DL (ref 3.2–4.9)
ALBUMIN/GLOB SERPL: 1.7 G/DL
ALP SERPL-CCNC: 77 U/L (ref 30–99)
ALT SERPL-CCNC: 41 U/L (ref 2–50)
ANION GAP SERPL CALC-SCNC: 12 MMOL/L (ref 7–16)
AST SERPL-CCNC: 24 U/L (ref 12–45)
BASOPHILS # BLD AUTO: 1.7 % (ref 0–1.8)
BASOPHILS # BLD: 0.08 K/UL (ref 0–0.12)
BILIRUB SERPL-MCNC: 0.8 MG/DL (ref 0.1–1.5)
BUN SERPL-MCNC: 16 MG/DL (ref 8–22)
CALCIUM ALBUM COR SERPL-MCNC: 9.1 MG/DL (ref 8.5–10.5)
CALCIUM SERPL-MCNC: 9.3 MG/DL (ref 8.5–10.5)
CHLORIDE SERPL-SCNC: 105 MMOL/L (ref 96–112)
CHOLEST SERPL-MCNC: 164 MG/DL (ref 100–199)
CO2 SERPL-SCNC: 27 MMOL/L (ref 20–33)
CREAT SERPL-MCNC: 0.62 MG/DL (ref 0.5–1.4)
EOSINOPHIL # BLD AUTO: 0.12 K/UL (ref 0–0.51)
EOSINOPHIL NFR BLD: 2.6 % (ref 0–6.9)
ERYTHROCYTE [DISTWIDTH] IN BLOOD BY AUTOMATED COUNT: 43.1 FL (ref 35.9–50)
FASTING STATUS PATIENT QL REPORTED: NORMAL
FERRITIN SERPL-MCNC: 146 NG/ML (ref 22–322)
FOLATE SERPL-MCNC: 9.1 NG/ML
GFR SERPLBLD CREATININE-BSD FMLA CKD-EPI: 104 ML/MIN/1.73 M 2
GLOBULIN SER CALC-MCNC: 2.5 G/DL (ref 1.9–3.5)
GLUCOSE SERPL-MCNC: 98 MG/DL (ref 65–99)
HCT VFR BLD AUTO: 49.1 % (ref 42–52)
HDLC SERPL-MCNC: 44 MG/DL
HGB BLD-MCNC: 16.4 G/DL (ref 14–18)
IMM GRANULOCYTES # BLD AUTO: 0.04 K/UL (ref 0–0.11)
IMM GRANULOCYTES NFR BLD AUTO: 0.9 % (ref 0–0.9)
IRON SATN MFR SERPL: 46 % (ref 15–55)
IRON SERPL-MCNC: 125 UG/DL (ref 50–180)
LDLC SERPL CALC-MCNC: 97 MG/DL
LYMPHOCYTES # BLD AUTO: 1.44 K/UL (ref 1–4.8)
LYMPHOCYTES NFR BLD: 30.6 % (ref 22–41)
MCH RBC QN AUTO: 31 PG (ref 27–33)
MCHC RBC AUTO-ENTMCNC: 33.4 G/DL (ref 32.3–36.5)
MCV RBC AUTO: 92.8 FL (ref 81.4–97.8)
MONOCYTES # BLD AUTO: 0.35 K/UL (ref 0–0.85)
MONOCYTES NFR BLD AUTO: 7.4 % (ref 0–13.4)
NEUTROPHILS # BLD AUTO: 2.67 K/UL (ref 1.82–7.42)
NEUTROPHILS NFR BLD: 56.8 % (ref 44–72)
NRBC # BLD AUTO: 0 K/UL
NRBC BLD-RTO: 0 /100 WBC (ref 0–0.2)
PLATELET # BLD AUTO: 135 K/UL (ref 164–446)
PMV BLD AUTO: 13.1 FL (ref 9–12.9)
POTASSIUM SERPL-SCNC: 3.8 MMOL/L (ref 3.6–5.5)
PROT SERPL-MCNC: 6.8 G/DL (ref 6–8.2)
RBC # BLD AUTO: 5.29 M/UL (ref 4.7–6.1)
SODIUM SERPL-SCNC: 144 MMOL/L (ref 135–145)
TIBC SERPL-MCNC: 272 UG/DL (ref 250–450)
TRIGL SERPL-MCNC: 115 MG/DL (ref 0–149)
UIBC SERPL-MCNC: 147 UG/DL (ref 110–370)
VIT B12 SERPL-MCNC: 866 PG/ML (ref 211–911)
WBC # BLD AUTO: 4.7 K/UL (ref 4.8–10.8)

## 2023-07-26 PROCEDURE — 83550 IRON BINDING TEST: CPT

## 2023-07-26 PROCEDURE — 82746 ASSAY OF FOLIC ACID SERUM: CPT

## 2023-07-26 PROCEDURE — 82306 VITAMIN D 25 HYDROXY: CPT

## 2023-07-26 PROCEDURE — 82607 VITAMIN B-12: CPT

## 2023-07-26 PROCEDURE — 85025 COMPLETE CBC W/AUTO DIFF WBC: CPT

## 2023-07-26 PROCEDURE — 36415 COLL VENOUS BLD VENIPUNCTURE: CPT

## 2023-07-26 PROCEDURE — 84425 ASSAY OF VITAMIN B-1: CPT

## 2023-07-26 PROCEDURE — 80053 COMPREHEN METABOLIC PANEL: CPT

## 2023-07-26 PROCEDURE — 80061 LIPID PANEL: CPT

## 2023-07-26 PROCEDURE — 82728 ASSAY OF FERRITIN: CPT

## 2023-07-26 PROCEDURE — 83540 ASSAY OF IRON: CPT

## 2023-07-29 LAB — VIT B1 BLD-MCNC: 147 NMOL/L (ref 70–180)

## 2023-08-03 ENCOUNTER — OFFICE VISIT (OUTPATIENT)
Dept: CARDIOLOGY | Facility: MEDICAL CENTER | Age: 68
End: 2023-08-03
Payer: COMMERCIAL

## 2023-08-03 VITALS
HEART RATE: 60 BPM | RESPIRATION RATE: 16 BRPM | OXYGEN SATURATION: 95 % | WEIGHT: 220 LBS | SYSTOLIC BLOOD PRESSURE: 106 MMHG | HEIGHT: 70 IN | BODY MASS INDEX: 31.5 KG/M2 | DIASTOLIC BLOOD PRESSURE: 60 MMHG

## 2023-08-03 DIAGNOSIS — Z79.01 CHRONIC ANTICOAGULATION: ICD-10-CM

## 2023-08-03 DIAGNOSIS — I10 ESSENTIAL HYPERTENSION: ICD-10-CM

## 2023-08-03 DIAGNOSIS — Z79.899 LONG TERM CURRENT USE OF ANTIARRHYTHMIC DRUG: ICD-10-CM

## 2023-08-03 DIAGNOSIS — E03.9 ACQUIRED HYPOTHYROIDISM: ICD-10-CM

## 2023-08-03 DIAGNOSIS — E78.2 MIXED HYPERLIPIDEMIA: ICD-10-CM

## 2023-08-03 DIAGNOSIS — I48.0 PAF (PAROXYSMAL ATRIAL FIBRILLATION) (HCC): Primary | ICD-10-CM

## 2023-08-03 PROCEDURE — 99214 OFFICE O/P EST MOD 30 MIN: CPT | Performed by: INTERNAL MEDICINE

## 2023-08-03 PROCEDURE — RXMED WILLOW AMBULATORY MEDICATION CHARGE: Performed by: INTERNAL MEDICINE

## 2023-08-03 PROCEDURE — 99212 OFFICE O/P EST SF 10 MIN: CPT | Performed by: INTERNAL MEDICINE

## 2023-08-03 PROCEDURE — 3074F SYST BP LT 130 MM HG: CPT | Performed by: INTERNAL MEDICINE

## 2023-08-03 PROCEDURE — 3078F DIAST BP <80 MM HG: CPT | Performed by: INTERNAL MEDICINE

## 2023-08-03 RX ORDER — LEVOTHYROXINE SODIUM 0.15 MG/1
150 TABLET ORAL
Qty: 100 TABLET | Refills: 0 | Status: SHIPPED | OUTPATIENT
Start: 2023-08-03 | End: 2023-10-19 | Stop reason: SDUPTHER

## 2023-08-03 ASSESSMENT — FIBROSIS 4 INDEX: FIB4 SCORE: 1.89

## 2023-08-03 NOTE — PROGRESS NOTES
"CARDIOLOGY established PATIENT:    PCP: PATRICIA Ingram    1. PAF (paroxysmal atrial fibrillation) (HCC)    2. Chronic anticoagulation    3. Essential hypertension    4. Long term current use of antiarrhythmic drug    5. Mixed hyperlipidemia    6. Acquired hypothyroidism        Michael Villarreal here for pAF follow up.    Chief Complaint   Patient presents with    Atrial Fibrillation     F/V dx: PAF (paroxysmal atrial fibrillation) (HCC)       History:     Michael Villarreal is a 67 y.o. male with paroxysmal atrial fibrillation, chronic anticoagulation, hypothyroidism controlled, hypertension, dyslipidemia, ?? statin intolerance (atorva triggered A fib, on pravastatin now), FORREST not on capa, and remote smoker (quit > 30 yrs ago) is here for follow-up.     A fib ablation decision postponed pending weight loss progress after his gastric sleeve. Lost ~ 80 lbs since the gastric sleeve 2022     EP clinic / Dr. Parks 23: re-address ablation in follow up in 2 weeks.    No A fib symptoms over last 7 months. On propafenone BID and Apixaban. No bleeding concerns     Denies CP, SOB, LH, dizziness, syncope, lower extremity edema, orthopnea, PND.     Walks for exercise. Feels great.     Can not tolerate FORREST due to how he sleeps.    Father  last month at age 94 after hip fractures / falls.     Lipid panel 23:   , LDL 97     TTE 2022: Personally reviewed  Normal left ventricular size, thickness, systolic function, and diastolic function.  The left ventricular ejection fraction is visually estimated to be 65%.    TSH normal 10/2022. No PCP as of recently.    Mild chronic intermittent thrombocytopenia. No anemia.    PE:  /60 (BP Location: Left arm, Patient Position: Sitting, BP Cuff Size: Adult)   Pulse 60   Resp 16   Ht 1.778 m (5' 10\")   Wt 99.8 kg (220 lb)   SpO2 95%   BMI 31.57 kg/m²     Gen: well  HEENT: Symmetric face. Anicteric sclerae. Moist mucus membranes  NECK: No JVD. " "  CARDIAC: Regular, Normal S1, S2, No murmur  VASCULATURE: carotids are normal bilaterally without bruit  RESP: Clear to auscultation bilaterally   ABD: Soft, non-tender, non-distended  EXT: No edema, no clubbing or cyanosis  SKIN: Warm and dry  NEURO: No gross deficits  PSYCH: Appropriate affect, participates in conversation    The 10-year ASCVD risk score (Stephy DK, et al., 2019) is: 12.7%    Past Medical History:   Diagnosis Date    Daytime sleepiness     Disorder of thyroid     High cholesterol     Hyperlipidemia     Hypertension     Obesity     Paroxysmal atrial fibrillation (HCC)     Postoperative pain 12/27/2022    Snoring     Unspecified cataract      Past Surgical History:   Procedure Laterality Date    [place] LAP, ALLEN RESTRICT PROC, LONGITUDINAL GAS* N/A 12/27/2022    Procedure: LAPAROSCOPIC SLEEVE GASTRECTOMY;  Surgeon: Aubrey Barrera M.D.;  Location: [facility];  Service: General    CATARACT PHACO WITH IOL  4/20/2015    Performed by Jakob Garcia M.D. at [facility]    CATARACT PHACO WITH IOL  4/1/2015    Performed by Jakob Garcia M.D. at SURGERY SAME DAY [facility] ORS    DUPUYTREN CONTRACTURE RELEASE  7/17/2012    Performed by RAFAEL CALVILLO at SURGERY [facility] ORS    HAND SURGERY      TONSILLECTOMY       Allergies   Allergen Reactions    Lipitor [Atorvastatin]      \"Triggered A fib\"     Outpatient Encounter Medications as of 8/3/2023   Medication Sig Dispense Refill    levothyroxine (SYNTHROID) 150 MCG Tab TAKE ONE TABLET BY MOUTH EVERY MORNING ON AN EMPTY STOMACH 100 Tablet 0    metoprolol tartrate (LOPRESSOR) 25 MG Tab Take 1 Tablet by mouth 2 times a day. 180 Tablet 7    B Complex Vitamins (VITAMIN-B COMPLEX PO) Complex B   1 chewable per day      VITAMIN D PO Take  by mouth.      CALCIUM PO Take  by mouth.      metFORMIN (GLUCOPHAGE) 500 MG Tab Take 500 mg by mouth 2 times a day with meals.      propafenone (RYTHMOL) 150 MG Tab Take 1 Tablet by mouth 2 times a day. 180 " Tablet 6    pravastatin (PRAVACHOL) 20 MG Tab Take 1 Tablet by mouth every evening. 90 Tablet 3    omeprazole (PRILOSEC) 20 MG delayed-release capsule Take 1 Capsule by mouth every day. Open capsule and mix in 10cc H2O 30 Capsule 11    apixaban (ELIQUIS) 5mg Tab TAKE ONE TABLET BY MOUTH TWICE A  Tablet 7    [DISCONTINUED] POTASSIUM PO Take  by mouth. (Patient not taking: Reported on 8/3/2023)      [DISCONTINUED] levothyroxine (SYNTHROID) 150 MCG Tab TAKE ONE TABLET BY MOUTH EVERY MORNING ON AN EMPTY STOMACH 100 Tablet 2     No facility-administered encounter medications on file as of 8/3/2023.     Social History     Socioeconomic History    Marital status:      Spouse name: Not on file    Number of children: Not on file    Years of education: Not on file    Highest education level: Not on file   Occupational History    Not on file   Tobacco Use    Smoking status: Former     Packs/day: 1.00     Years: 5.00     Pack years: 5.00     Types: Cigarettes     Quit date: 1984     Years since quittin.0    Smokeless tobacco: Former     Types: Chew     Quit date: 1984   Vaping Use    Vaping Use: Never used   Substance and Sexual Activity    Alcohol use: Not Currently     Comment: rarely    Drug use: No    Sexual activity: Yes     Partners: Female     Birth control/protection: None   Other Topics Concern    Not on file   Social History Narrative    Not on file     Social Determinants of Health     Financial Resource Strain: Not on file   Food Insecurity: Not on file   Transportation Needs: Not on file   Physical Activity: Not on file   Stress: Not on file   Social Connections: Not on file   Intimate Partner Violence: Not on file   Housing Stability: Not on file     Family History   Problem Relation Age of Onset    Other Mother 68        kidney CA    Heart Disease Mother         CHF at 86         Studies    Lab Results   Component Value Date/Time    TSHULTRASEN 1.510 10/14/2022 0638        Lab Results    Component Value Date/Time    FREET4 0.77 08/12/2013 2349      Lab Results   Component Value Date/Time    HBA1C 5.6 09/25/2020 06:09 AM     Lab Results   Component Value Date/Time    CHOLSTRLTOT 164 07/26/2023 06:25 AM    LDL 97 07/26/2023 06:25 AM    HDL 44 07/26/2023 06:25 AM    TRIGLYCERIDE 115 07/26/2023 06:25 AM       Lab Results   Component Value Date/Time    SODIUM 144 07/26/2023 06:25 AM    POTASSIUM 3.8 07/26/2023 06:25 AM    CHLORIDE 105 07/26/2023 06:25 AM    CO2 27 07/26/2023 06:25 AM    GLUCOSE 98 07/26/2023 06:25 AM    BUN 16 07/26/2023 06:25 AM    CREATININE 0.62 07/26/2023 06:25 AM     Lab Results   Component Value Date/Time    ALKPHOSPHAT 77 07/26/2023 06:25 AM    ASTSGOT 24 07/26/2023 06:25 AM    ALTSGPT 41 07/26/2023 06:25 AM    TBILIRUBIN 0.8 07/26/2023 06:25 AM        Echocardiogram:  Results for orders placed or performed during the hospital encounter of 11/02/15   Echocardiogram Comp w/o Cont   Result Value Ref Range    Eject.Frac. MOD BP 68.48     Eject.Frac. MOD 4C 65.87     Eject.Frac. MOD 2C 54.51            Assessment and Recommendations:    Problem List Items Addressed This Visit       Hyperlipidemia    PAF (paroxysmal atrial fibrillation) (HCC) - Primary    Chronic anticoagulation    Acquired hypothyroidism    Relevant Medications    levothyroxine (SYNTHROID) 150 MCG Tab     Other Visit Diagnoses       Essential hypertension        Long term current use of antiarrhythmic drug              Mr. Villarreal seems to be doing well from a cardiovascular standpoint.  No medication changes today, he will discuss with EP in his upcoming appointment as to when they will plan attempting atrial fibrillation at ablation procedure after continued weight loss strategy since his gastric sleeve December 2022.  I gave the patient my input regarding his A-fib management with the following 3 options and advised him to finalize the approach with the EP team:  -Proceed with A-fib ablation and potentially stop  propafenone and apixaban afterwards  -Stop propafenone for 3 months, if no recurrent symptoms, stop apixaban  -Stop propafenone and apixaban with watchful waiting understanding his low risk of stroke about 2-3% annually off AC     No bleeding concerns, recommend at least twice a year CBC and basic metabolic panel due to him being on chronic anticoagulation and mild intermittent thrombocytopenia.  No anemia.     Recommend at least annual lipid panel.  His triglyceride and LDL values normalized after recent weight loss.  Target LDL less than 100 and triglyceride less than 150.    I refilled his Synthroid given no PCP however advised him to establish care with a PCP soon for them to take over his hypothyroidism management and obtain at least annual TSH.    Thank you for the opportunity to be involved in Michael Villarreal 's care; and please reach out with any questions or concerns.    Return in about 6 months (around 2/3/2024).    Pedro Peoples MD, MPH [facility]  Interventional Cardiologist  [facility]   of Clinical Internal Medicine - [facility]    ~ Portions of this note were completed using voice recognition software (Dragon Naturally speaking software) . Occasional transcription errors may have escaped proof reading. I have made every reasonable attempt to correct obvious errors, but I expect that there are errors of grammar and possibly content that I did not discover before finalizing the note. ~

## 2023-08-04 ENCOUNTER — PHARMACY VISIT (OUTPATIENT)
Dept: PHARMACY | Facility: MEDICAL CENTER | Age: 68
End: 2023-08-04
Payer: COMMERCIAL

## 2023-08-16 ENCOUNTER — OFFICE VISIT (OUTPATIENT)
Dept: CARDIOLOGY | Facility: MEDICAL CENTER | Age: 68
End: 2023-08-16
Attending: INTERNAL MEDICINE
Payer: COMMERCIAL

## 2023-08-16 VITALS
HEART RATE: 59 BPM | HEIGHT: 70 IN | BODY MASS INDEX: 31.5 KG/M2 | OXYGEN SATURATION: 97 % | SYSTOLIC BLOOD PRESSURE: 118 MMHG | WEIGHT: 220 LBS | DIASTOLIC BLOOD PRESSURE: 68 MMHG | RESPIRATION RATE: 16 BRPM

## 2023-08-16 DIAGNOSIS — I48.0 PAF (PAROXYSMAL ATRIAL FIBRILLATION) (HCC): ICD-10-CM

## 2023-08-16 PROCEDURE — 3078F DIAST BP <80 MM HG: CPT | Performed by: INTERNAL MEDICINE

## 2023-08-16 PROCEDURE — 99212 OFFICE O/P EST SF 10 MIN: CPT | Performed by: INTERNAL MEDICINE

## 2023-08-16 PROCEDURE — 99214 OFFICE O/P EST MOD 30 MIN: CPT | Performed by: INTERNAL MEDICINE

## 2023-08-16 PROCEDURE — 3074F SYST BP LT 130 MM HG: CPT | Performed by: INTERNAL MEDICINE

## 2023-08-16 PROCEDURE — 93005 ELECTROCARDIOGRAM TRACING: CPT | Performed by: INTERNAL MEDICINE

## 2023-08-16 PROCEDURE — RXMED WILLOW AMBULATORY MEDICATION CHARGE: Performed by: INTERNAL MEDICINE

## 2023-08-16 ASSESSMENT — FIBROSIS 4 INDEX: FIB4 SCORE: 1.89

## 2023-08-16 NOTE — PROGRESS NOTES
Arrhythmia Clinic Note (Established patient)    DOS: 8/16/2023    Chief complaint/Reason for consult: Afib    Interval History: 67 y/o M with morbid obesity now s/p gastric sleeve, pAF on Rhythmol. No episodes of afib for over 6 months. Doing well and continues to lose weight.    ROS (+ highlighted in bold):  Constitutional: Fevers/chills/fatigue/weightloss  HEENT: Blurry vision/eye pain/sore throat/hearing loss  Respiratory: Shortness of breath/cough  Cardiovascular: Chest pain/palpitations/edema/orthopnea/syncope  GI: Nausea/vomitting/diarrhea  MSK: Arthralgias/myagias/muscle weakness  Skin: Rash/sores  Neurological: Numbness/tremors/vertigo  Endocrine: Excessive thirst/polyuria/cold intolerance/heat intolerance  Psych: Depression/anxiety    Past Medical History:   Diagnosis Date    Daytime sleepiness     Disorder of thyroid     High cholesterol     Hyperlipidemia     Hypertension     Obesity     Paroxysmal atrial fibrillation (HCC)     Postoperative pain 12/27/2022    Snoring     Unspecified cataract        Past Surgical History:   Procedure Laterality Date    [place] LAP, ALLEN RESTRICT PROC, LONGITUDINAL GAS* N/A 12/27/2022    Procedure: LAPAROSCOPIC SLEEVE GASTRECTOMY;  Surgeon: Aubrey Barrera M.D.;  Location: [facility];  Service: General    CATARACT PHACO WITH IOL  4/20/2015    Performed by Jakob Garcia M.D. at SURGERY [facility] ORS    CATARACT PHACO WITH IOL  4/1/2015    Performed by Jakob Garcia M.D. at SURGERY SAME DAY [facility] ORS    DUPUYTREN CONTRACTURE RELEASE  7/17/2012    Performed by RAFAEL CALVILLO at SURGERY [facility] ORS    HAND SURGERY      TONSILLECTOMY         Social History     Socioeconomic History    Marital status:      Spouse name: Not on file    Number of children: Not on file    Years of education: Not on file    Highest education level: Not on file   Occupational History    Not on file   Tobacco Use    Smoking status: Former     Packs/day: 1.00     Years: 5.00      "Additional pack years: 0.00     Total pack years: 5.00     Types: Cigarettes     Quit date: 1984     Years since quittin.1    Smokeless tobacco: Former     Types: Chew     Quit date: 1984   Vaping Use    Vaping Use: Never used   Substance and Sexual Activity    Alcohol use: Not Currently     Comment: rarely    Drug use: No    Sexual activity: Yes     Partners: Female     Birth control/protection: None   Other Topics Concern    Not on file   Social History Narrative    Not on file     Social Determinants of Health     Financial Resource Strain: Not on file   Food Insecurity: Not on file   Transportation Needs: Not on file   Physical Activity: Not on file   Stress: Not on file   Social Connections: Not on file   Intimate Partner Violence: Not on file   Housing Stability: Not on file       Family History   Problem Relation Age of Onset    Other Mother 68        kidney CA    Heart Disease Mother         CHF at 86       Allergies   Allergen Reactions    Lipitor [Atorvastatin]      \"Triggered A fib\"       Current Outpatient Medications   Medication Sig Dispense Refill    levothyroxine (SYNTHROID) 150 MCG Tab TAKE ONE TABLET BY MOUTH EVERY MORNING ON AN EMPTY STOMACH 100 Tablet 0    CALCIUM PO Take  by mouth.      propafenone (RYTHMOL) 150 MG Tab Take 1 Tablet by mouth 2 times a day. 180 Tablet 6    pravastatin (PRAVACHOL) 20 MG Tab Take 1 Tablet by mouth every evening. 90 Tablet 3    omeprazole (PRILOSEC) 20 MG delayed-release capsule Take 1 Capsule by mouth every day. Open capsule and mix in 10cc H2O 30 Capsule 11    apixaban (ELIQUIS) 5mg Tab TAKE ONE TABLET BY MOUTH TWICE A  Tablet 7    B Complex Vitamins (VITAMIN-B COMPLEX PO) Complex B   1 chewable per day (Patient not taking: Reported on 2023)      VITAMIN D PO Take  by mouth. (Patient not taking: Reported on 2023)       No current facility-administered medications for this visit.       Physical Exam:  Vitals:    23 0820   BP: " "118/68   BP Location: Left arm   Patient Position: Sitting   BP Cuff Size: Adult   Pulse: (!) 59   Resp: 16   SpO2: 97%   Weight: 99.8 kg (220 lb)   Height: 1.778 m (5' 10\")     General appearance: NAD, conversant   Eyes: anicteric sclerae, moist conjunctivae; no lid-lag; PERRLA  HENT: Atraumatic; oropharynx clear with moist mucous membranes and no mucosal ulcerations; normal hard and soft palate  Neck: Trachea midline; FROM, supple, no thyromegaly or lymphadenopathy  Lungs: CTA, with normal respiratory effort and no intercostal retractions  CV: RRR, no MRGs, no JVD  Abdomen: Soft, non-tender; no masses or HSM  Extremities: No peripheral edema or extremity lymphadenopathy  Skin: Normal temperature, turgor and texture; no rash, ulcers or subcutaneous nodules  Psych: Appropriate affect, alert and oriented to person, place and time    Data:  Lipids:   Lab Results   Component Value Date/Time    CHOLSTRLTOT 164 07/26/2023 06:25 AM    TRIGLYCERIDE 115 07/26/2023 06:25 AM    HDL 44 07/26/2023 06:25 AM    LDL 97 07/26/2023 06:25 AM        BMP:  Lab Results   Component Value Date/Time    SODIUM 144 07/26/2023 0625    POTASSIUM 3.8 07/26/2023 0625    CHLORIDE 105 07/26/2023 0625    CO2 27 07/26/2023 0625    GLUCOSE 98 07/26/2023 0625    BUN 16 07/26/2023 0625    CREATININE 0.62 07/26/2023 0625    CALCIUM 9.3 07/26/2023 0625    ANION 12.0 07/26/2023 0625        TSH:   Lab Results   Component Value Date/Time    TSHULTRASEN 1.510 10/14/2022 0638        THYROXINE (T4):   No results found for: \"FREEDIR\"     CBC:   Lab Results   Component Value Date/Time    WBC 4.7 (L) 07/26/2023 06:25 AM    RBC 5.29 07/26/2023 06:25 AM    HEMOGLOBIN 16.4 07/26/2023 06:25 AM    HEMATOCRIT 49.1 07/26/2023 06:25 AM    MCV 92.8 07/26/2023 06:25 AM    MCH 31.0 07/26/2023 06:25 AM    MCHC 33.4 07/26/2023 06:25 AM    RDW 43.1 07/26/2023 06:25 AM    PLATELETCT 135 (L) 07/26/2023 06:25 AM    MPV 13.1 (H) 07/26/2023 06:25 AM    NEUTSPOLYS 56.80 07/26/2023 " 06:25 AM    LYMPHOCYTES 30.60 07/26/2023 06:25 AM    MONOCYTES 7.40 07/26/2023 06:25 AM    EOSINOPHILS 2.60 07/26/2023 06:25 AM    BASOPHILS 1.70 07/26/2023 06:25 AM    IMMGRAN 0.90 07/26/2023 06:25 AM    NRBC 0.00 07/26/2023 06:25 AM    NEUTS 2.67 07/26/2023 06:25 AM    LYMPHS 1.44 07/26/2023 06:25 AM    MONOS 0.35 07/26/2023 06:25 AM    EOS 0.12 07/26/2023 06:25 AM    BASO 0.08 07/26/2023 06:25 AM    IMMGRANAB 0.04 07/26/2023 06:25 AM    NRBCAB 0.00 07/26/2023 06:25 AM        CBC w/o DIFF  Lab Results   Component Value Date/Time    WBC 4.7 (L) 07/26/2023 06:25 AM    RBC 5.29 07/26/2023 06:25 AM    HEMOGLOBIN 16.4 07/26/2023 06:25 AM    MCV 92.8 07/26/2023 06:25 AM    MCH 31.0 07/26/2023 06:25 AM    MCHC 33.4 07/26/2023 06:25 AM    RDW 43.1 07/26/2023 06:25 AM    MPV 13.1 (H) 07/26/2023 06:25 AM       Prior echo/stress reviewed: Normal echo    EKG interpreted by me: NSR    Impression/Plan:  1. PAF (paroxysmal atrial fibrillation) ([facility])  EKG        pAF  High risk medication use    - He has had a great result from weight loss  - ECG reviewed continue Rhythmol for now  - In January he will stop Rhythmol and see if afib comes back, I will see him in Feb to check in, if recurrent Afib despite weight loss he would prefer ablation over Rhythmol long term    FV 6 months    Uri Parks MD  Cardiac Electrophysiology

## 2023-08-21 ENCOUNTER — PHARMACY VISIT (OUTPATIENT)
Dept: PHARMACY | Facility: MEDICAL CENTER | Age: 68
End: 2023-08-21
Payer: COMMERCIAL

## 2023-08-31 LAB — EKG IMPRESSION: NORMAL

## 2023-08-31 PROCEDURE — 93010 ELECTROCARDIOGRAM REPORT: CPT | Performed by: INTERNAL MEDICINE

## 2023-09-06 ENCOUNTER — PHARMACY VISIT (OUTPATIENT)
Dept: PHARMACY | Facility: MEDICAL CENTER | Age: 68
End: 2023-09-06
Payer: COMMERCIAL

## 2023-09-06 DIAGNOSIS — I48.0 PAF (PAROXYSMAL ATRIAL FIBRILLATION) (HCC): ICD-10-CM

## 2023-09-06 PROCEDURE — RXMED WILLOW AMBULATORY MEDICATION CHARGE: Performed by: INTERNAL MEDICINE

## 2023-09-11 PROCEDURE — RXMED WILLOW AMBULATORY MEDICATION CHARGE: Performed by: INTERNAL MEDICINE

## 2023-09-12 ENCOUNTER — PHARMACY VISIT (OUTPATIENT)
Dept: PHARMACY | Facility: MEDICAL CENTER | Age: 68
End: 2023-09-12
Payer: COMMERCIAL

## 2023-10-19 ENCOUNTER — OFFICE VISIT (OUTPATIENT)
Dept: MEDICAL GROUP | Facility: PHYSICIAN GROUP | Age: 68
End: 2023-10-19
Payer: COMMERCIAL

## 2023-10-19 VITALS
DIASTOLIC BLOOD PRESSURE: 72 MMHG | SYSTOLIC BLOOD PRESSURE: 126 MMHG | TEMPERATURE: 96.5 F | OXYGEN SATURATION: 97 % | HEIGHT: 69 IN | WEIGHT: 217.2 LBS | BODY MASS INDEX: 32.17 KG/M2 | HEART RATE: 73 BPM | RESPIRATION RATE: 18 BRPM

## 2023-10-19 DIAGNOSIS — Z13.6 ENCOUNTER FOR ABDOMINAL AORTIC ANEURYSM (AAA) SCREENING: ICD-10-CM

## 2023-10-19 DIAGNOSIS — E03.9 ACQUIRED HYPOTHYROIDISM: ICD-10-CM

## 2023-10-19 DIAGNOSIS — I48.0 PAF (PAROXYSMAL ATRIAL FIBRILLATION) (HCC): ICD-10-CM

## 2023-10-19 DIAGNOSIS — Z11.59 NEED FOR HEPATITIS C SCREENING TEST: ICD-10-CM

## 2023-10-19 DIAGNOSIS — Z12.5 ENCOUNTER FOR PROSTATE CANCER SCREENING: ICD-10-CM

## 2023-10-19 DIAGNOSIS — Z23 NEED FOR VACCINATION: ICD-10-CM

## 2023-10-19 DIAGNOSIS — E78.2 MIXED HYPERLIPIDEMIA: ICD-10-CM

## 2023-10-19 PROCEDURE — 90662 IIV NO PRSV INCREASED AG IM: CPT

## 2023-10-19 PROCEDURE — 90471 IMMUNIZATION ADMIN: CPT

## 2023-10-19 PROCEDURE — 3078F DIAST BP <80 MM HG: CPT

## 2023-10-19 PROCEDURE — 99214 OFFICE O/P EST MOD 30 MIN: CPT | Mod: 25

## 2023-10-19 PROCEDURE — 3074F SYST BP LT 130 MM HG: CPT

## 2023-10-19 RX ORDER — LEVOTHYROXINE SODIUM 0.15 MG/1
150 TABLET ORAL
Qty: 100 TABLET | Refills: 3 | Status: SHIPPED | OUTPATIENT
Start: 2023-10-19

## 2023-10-19 RX ORDER — PROPAFENONE HYDROCHLORIDE 150 MG/1
150 TABLET, COATED ORAL 2 TIMES DAILY
Qty: 180 TABLET | Refills: 6 | Status: SHIPPED | OUTPATIENT
Start: 2023-10-19

## 2023-10-19 RX ORDER — PRAVASTATIN SODIUM 20 MG
20 TABLET ORAL NIGHTLY
Qty: 90 TABLET | Refills: 3 | Status: SHIPPED | OUTPATIENT
Start: 2023-10-19

## 2023-10-19 ASSESSMENT — FIBROSIS 4 INDEX: FIB4 SCORE: 1.89

## 2023-10-19 NOTE — ASSESSMENT & PLAN NOTE
Currently on levothyroxine 150mcg. Reports diagnosis during episode of a fib RVR, no longer follows endocrinology. Has been on this 150mcg dosing for the last year. Denies hyper/hypo sys.

## 2023-10-19 NOTE — PROGRESS NOTES
"CC:   Chief Complaint   Patient presents with    [facility]        HISTORY OF PRESENT ILLNESS: Patient is a 68 y.o. male established patient who presents today to discuss the following problems below:     PAF (paroxysmal atrial fibrillation) ([facility])  Patient presents today to establish care.  History of paroxysmal atrial fibrillation, currently on Eliquis 5 mg twice daily as well as propafenone 150 mg twice daily. Sees cardiology annually. He reports that he had a sleeve gastrectomy and no longer has atrial fibrillation symptoms. He recently had labs completed through [facility] [place]. Currently taking a multivitamin.     Hyperlipidemia  Goal in currently on pravastatin 20 mg nightly. Denies any myositis.   Lab Results   Component Value Date/Time    CHOLSTRLTOT 164 07/26/2023 06:25 AM    TRIGLYCERIDE 115 07/26/2023 06:25 AM    HDL 44 07/26/2023 06:25 AM    LDL 97 07/26/2023 06:25 AM   ]  The 10-year ASCVD risk score (Stephy JEAN BAPTISTE, et al., 2019) is: 14.6%      Acquired hypothyroidism  Currently on levothyroxine 150mcg. Reports diagnosis during episode of a fib RVR, no longer follows endocrinology. Has been on this 150mcg dosing for the last year. Denies hyper/hypo sys.       Review of Systems: Otherwise negative except for as stated above.      Exam: /72   Pulse 73   Temp 35.8 °C (96.5 °F) (Temporal)   Resp 18   Ht 1.753 m (5' 9\")   Wt 98.5 kg (217 lb 3.2 oz)   SpO2 97%  Body mass index is 32.07 kg/m².    Physical Exam  Vitals reviewed.   Constitutional:       Appearance: Normal appearance.   Eyes:      Extraocular Movements: Extraocular movements intact.   Cardiovascular:      Rate and Rhythm: Normal rate and regular rhythm.   Pulmonary:      Effort: Pulmonary effort is normal.      Breath sounds: Normal breath sounds.   Neurological:      General: No focal deficit present.      Mental Status: He is alert and oriented to person, place, and time.   Psychiatric:         Mood and Affect: Mood normal.         Behavior: " Behavior normal.         Assessment/Plan:  68 y.o. male with the following -    1. Need for vaccination  - Influenza Vaccine, High Dose (65+ Only)    2. PAF (paroxysmal atrial fibrillation) (HCC)  Chronic, stable. Continue medications as below.   Plan to DC Rythmol in January per cardiology notes as a trial to see if his symptoms return.  He does need a refill today.  He will follow-up with cardiology in February  - propafenone (RYTHMOL) 150 MG Tab; Take 1 Tablet by mouth 2 times a day.  Dispense: 180 Tablet; Refill: 6  - apixaban (ELIQUIS) 5mg Tab; TAKE ONE TABLET BY MOUTH TWICE A DAY  Dispense: 180 Tablet; Refill: 7    3. Mixed hyperlipidemia  Chronic, stable.  Due for updated labs.  Continue pravastatin 20 mg nightly  - Lipid Profile; Future  - pravastatin (PRAVACHOL) 20 MG Tab; Take 1 Tablet by mouth every evening.  Dispense: 90 Tablet; Refill: 3    4. Acquired hypothyroidism  Chronic, stable.  Due for updated labs.  Continue levothyroxine 150 mcg daily  - levothyroxine (SYNTHROID) 150 MCG Tab; TAKE ONE TABLET BY MOUTH EVERY MORNING ON AN EMPTY STOMACH  Dispense: 100 Tablet; Refill: 3  - TSH+FREE T4    5. Encounter for abdominal aortic aneurysm (AAA) screening  - US-ABDOMINAL AORTA W/O DOPPLER; Future    6. Encounter for prostate cancer screening  - PROSTATE SPECIFIC AG SCREENING; Future    7. Need for hepatitis C screening test  - HEP C VIRUS ANTIBODY; Future      Follow-up: Return in about 6 months (around 4/19/2024) for AWV.    Health Maintenance: Completed   Colonoscopy in the first of the year.       Please note that this dictation was created using voice recognition software. I have made every reasonable attempt to correct obvious errors, but I expect that there are errors of grammar and possibly content that I did not discover before finalizing the note.    Electronically signed by STANISLAV Schneider on October 19, 2023

## 2023-10-19 NOTE — ASSESSMENT & PLAN NOTE
Goal in currently on pravastatin 20 mg nightly. Denies any myositis.   Lab Results   Component Value Date/Time    CHOLSTRLTOT 164 07/26/2023 06:25 AM    TRIGLYCERIDE 115 07/26/2023 06:25 AM    HDL 44 07/26/2023 06:25 AM    LDL 97 07/26/2023 06:25 AM   ]  The 10-year ASCVD risk score (Stephy JEAN BAPTISTE, et al., 2019) is: 14.6%

## 2023-10-19 NOTE — ASSESSMENT & PLAN NOTE
Patient presents today to establish care.  History of paroxysmal atrial fibrillation, currently on Eliquis 5 mg twice daily as well as propafenone 150 mg twice daily. Sees cardiology annually. He reports that he had a sleeve gastrectomy and no longer has atrial fibrillation symptoms. [place] [facility] Currently taking a multivitamin.

## 2023-12-26 ENCOUNTER — HOSPITAL ENCOUNTER (OUTPATIENT)
Dept: LAB | Facility: MEDICAL CENTER | Age: 68
End: 2023-12-26
Attending: CLINICAL NURSE SPECIALIST
Payer: COMMERCIAL

## 2023-12-26 ENCOUNTER — HOSPITAL ENCOUNTER (OUTPATIENT)
Dept: LAB | Facility: MEDICAL CENTER | Age: 68
End: 2023-12-26
Payer: COMMERCIAL

## 2023-12-26 DIAGNOSIS — Z12.5 ENCOUNTER FOR PROSTATE CANCER SCREENING: ICD-10-CM

## 2023-12-26 DIAGNOSIS — E78.2 MIXED HYPERLIPIDEMIA: ICD-10-CM

## 2023-12-26 LAB
25(OH)D3 SERPL-MCNC: 62 NG/ML (ref 30–100)
ALBUMIN SERPL BCP-MCNC: 4.3 G/DL (ref 3.2–4.9)
ALBUMIN/GLOB SERPL: 1.8 G/DL
ALP SERPL-CCNC: 64 U/L (ref 30–99)
ALT SERPL-CCNC: 33 U/L (ref 2–50)
ANION GAP SERPL CALC-SCNC: 12 MMOL/L (ref 7–16)
AST SERPL-CCNC: 25 U/L (ref 12–45)
BASOPHILS # BLD AUTO: 1.5 % (ref 0–1.8)
BASOPHILS # BLD: 0.06 K/UL (ref 0–0.12)
BILIRUB SERPL-MCNC: 0.6 MG/DL (ref 0.1–1.5)
BUN SERPL-MCNC: 14 MG/DL (ref 8–22)
CALCIUM ALBUM COR SERPL-MCNC: 9 MG/DL (ref 8.5–10.5)
CALCIUM SERPL-MCNC: 9.2 MG/DL (ref 8.5–10.5)
CHLORIDE SERPL-SCNC: 106 MMOL/L (ref 96–112)
CHOLEST SERPL-MCNC: 157 MG/DL (ref 100–199)
CO2 SERPL-SCNC: 27 MMOL/L (ref 20–33)
CREAT SERPL-MCNC: 0.7 MG/DL (ref 0.5–1.4)
EOSINOPHIL # BLD AUTO: 0.11 K/UL (ref 0–0.51)
EOSINOPHIL NFR BLD: 2.7 % (ref 0–6.9)
ERYTHROCYTE [DISTWIDTH] IN BLOOD BY AUTOMATED COUNT: 43.4 FL (ref 35.9–50)
FASTING STATUS PATIENT QL REPORTED: NORMAL
FERRITIN SERPL-MCNC: 148 NG/ML (ref 22–322)
FOLATE SERPL-MCNC: 10.5 NG/ML
GFR SERPLBLD CREATININE-BSD FMLA CKD-EPI: 100 ML/MIN/1.73 M 2
GLOBULIN SER CALC-MCNC: 2.4 G/DL (ref 1.9–3.5)
GLUCOSE SERPL-MCNC: 103 MG/DL (ref 65–99)
HCT VFR BLD AUTO: 47.4 % (ref 42–52)
HDLC SERPL-MCNC: 48 MG/DL
HGB BLD-MCNC: 15.7 G/DL (ref 14–18)
IMM GRANULOCYTES # BLD AUTO: 0.05 K/UL (ref 0–0.11)
IMM GRANULOCYTES NFR BLD AUTO: 1.2 % (ref 0–0.9)
IRON SATN MFR SERPL: 43 % (ref 15–55)
IRON SERPL-MCNC: 120 UG/DL (ref 50–180)
LDLC SERPL CALC-MCNC: 81 MG/DL
LYMPHOCYTES # BLD AUTO: 1.32 K/UL (ref 1–4.8)
LYMPHOCYTES NFR BLD: 32.4 % (ref 22–41)
MCH RBC QN AUTO: 31.3 PG (ref 27–33)
MCHC RBC AUTO-ENTMCNC: 33.1 G/DL (ref 32.3–36.5)
MCV RBC AUTO: 94.4 FL (ref 81.4–97.8)
MONOCYTES # BLD AUTO: 0.29 K/UL (ref 0–0.85)
MONOCYTES NFR BLD AUTO: 7.1 % (ref 0–13.4)
NEUTROPHILS # BLD AUTO: 2.25 K/UL (ref 1.82–7.42)
NEUTROPHILS NFR BLD: 55.1 % (ref 44–72)
NRBC # BLD AUTO: 0 K/UL
NRBC BLD-RTO: 0 /100 WBC (ref 0–0.2)
PLATELET # BLD AUTO: 142 K/UL (ref 164–446)
PMV BLD AUTO: 13.5 FL (ref 9–12.9)
POTASSIUM SERPL-SCNC: 4.2 MMOL/L (ref 3.6–5.5)
PROT SERPL-MCNC: 6.7 G/DL (ref 6–8.2)
PSA SERPL-MCNC: 0.87 NG/ML (ref 0–4)
RBC # BLD AUTO: 5.02 M/UL (ref 4.7–6.1)
SODIUM SERPL-SCNC: 145 MMOL/L (ref 135–145)
T4 FREE SERPL-MCNC: 1.38 NG/DL (ref 0.93–1.7)
TIBC SERPL-MCNC: 282 UG/DL (ref 250–450)
TRIGL SERPL-MCNC: 140 MG/DL (ref 0–149)
TSH SERPL DL<=0.005 MIU/L-ACNC: 8.29 UIU/ML (ref 0.38–5.33)
UIBC SERPL-MCNC: 162 UG/DL (ref 110–370)
VIT B12 SERPL-MCNC: 916 PG/ML (ref 211–911)
WBC # BLD AUTO: 4.1 K/UL (ref 4.8–10.8)

## 2023-12-26 PROCEDURE — 83550 IRON BINDING TEST: CPT

## 2023-12-26 PROCEDURE — 82607 VITAMIN B-12: CPT

## 2023-12-26 PROCEDURE — 82306 VITAMIN D 25 HYDROXY: CPT

## 2023-12-26 PROCEDURE — 80061 LIPID PANEL: CPT

## 2023-12-26 PROCEDURE — 82746 ASSAY OF FOLIC ACID SERUM: CPT

## 2023-12-26 PROCEDURE — 84443 ASSAY THYROID STIM HORMONE: CPT

## 2023-12-26 PROCEDURE — 82728 ASSAY OF FERRITIN: CPT

## 2023-12-26 PROCEDURE — 36415 COLL VENOUS BLD VENIPUNCTURE: CPT

## 2023-12-26 PROCEDURE — 85025 COMPLETE CBC W/AUTO DIFF WBC: CPT

## 2023-12-26 PROCEDURE — 84425 ASSAY OF VITAMIN B-1: CPT

## 2023-12-26 PROCEDURE — 84153 ASSAY OF PSA TOTAL: CPT | Mod: GA

## 2023-12-26 PROCEDURE — 80053 COMPREHEN METABOLIC PANEL: CPT

## 2023-12-26 PROCEDURE — 83540 ASSAY OF IRON: CPT

## 2023-12-26 PROCEDURE — 84439 ASSAY OF FREE THYROXINE: CPT

## 2023-12-28 LAB — VIT B1 BLD-MCNC: 180 NMOL/L (ref 70–180)

## 2024-01-24 ENCOUNTER — TELEPHONE (OUTPATIENT)
Dept: HEALTH INFORMATION MANAGEMENT | Facility: OTHER | Age: 69
End: 2024-01-24
Payer: MEDICARE

## 2024-02-01 ENCOUNTER — OFFICE VISIT (OUTPATIENT)
Dept: MEDICAL GROUP | Facility: PHYSICIAN GROUP | Age: 69
End: 2024-02-01
Payer: MEDICARE

## 2024-02-01 VITALS
SYSTOLIC BLOOD PRESSURE: 122 MMHG | OXYGEN SATURATION: 97 % | HEART RATE: 68 BPM | RESPIRATION RATE: 17 BRPM | TEMPERATURE: 97.4 F | WEIGHT: 219.6 LBS | HEIGHT: 70 IN | BODY MASS INDEX: 31.44 KG/M2 | DIASTOLIC BLOOD PRESSURE: 78 MMHG

## 2024-02-01 DIAGNOSIS — E03.9 ACQUIRED HYPOTHYROIDISM: ICD-10-CM

## 2024-02-01 DIAGNOSIS — I48.0 PAF (PAROXYSMAL ATRIAL FIBRILLATION) (HCC): ICD-10-CM

## 2024-02-01 DIAGNOSIS — Z12.11 COLON CANCER SCREENING: ICD-10-CM

## 2024-02-01 DIAGNOSIS — D68.69 SECONDARY HYPERCOAGULABLE STATE (HCC): ICD-10-CM

## 2024-02-01 DIAGNOSIS — E78.2 MIXED HYPERLIPIDEMIA: ICD-10-CM

## 2024-02-01 PROCEDURE — 3078F DIAST BP <80 MM HG: CPT

## 2024-02-01 PROCEDURE — 3074F SYST BP LT 130 MM HG: CPT

## 2024-02-01 PROCEDURE — 99214 OFFICE O/P EST MOD 30 MIN: CPT

## 2024-02-01 RX ORDER — LEVOTHYROXINE SODIUM 0.2 MG/1
200 TABLET ORAL
Qty: 24 TABLET | Refills: 1 | Status: SHIPPED | OUTPATIENT
Start: 2024-02-01 | End: 2024-02-01

## 2024-02-01 RX ORDER — LEVOTHYROXINE SODIUM 0.2 MG/1
200 TABLET ORAL
Qty: 24 TABLET | Refills: 1 | Status: SHIPPED | OUTPATIENT
Start: 2024-02-01

## 2024-02-01 ASSESSMENT — PATIENT HEALTH QUESTIONNAIRE - PHQ9: CLINICAL INTERPRETATION OF PHQ2 SCORE: 0

## 2024-02-01 ASSESSMENT — FIBROSIS 4 INDEX: FIB4 SCORE: 2.08

## 2024-02-01 NOTE — ASSESSMENT & PLAN NOTE
Patient currently takes apixaban 5 mg twice daily as well as propafenone 150 mg twice daily.  He denies any palpitations or lightheadedness.

## 2024-02-01 NOTE — PROGRESS NOTES
"CC:   Chief Complaint   Patient presents with    Lab Follow-up        HISTORY OF PRESENT ILLNESS: Patient is a 68 y.o. male established patient who presents today to discuss the following problems below:     PAF (paroxysmal atrial fibrillation) (HCC)  Patient currently takes apixaban 5 mg twice daily as well as propafenone 150 mg twice daily.  He denies any palpitations or lightheadedness.    Acquired hypothyroidism  Patient is currently on Synthroid 150 mcg every morning.  He recently had labs updated as below   Latest Reference Range & Units 12/26/23 06:15   TSH 0.380 - 5.330 uIU/mL 8.290 (H)   Free T-4 0.93 - 1.70 ng/dL 1.38   (H): Data is abnormally high    He reports that he takes medication every morning on an empty stomach.     Hyperlipidemia  Patient is currently on pravastatin 20 mg nightly.  Tolerates dose well without side effects.  LDL below goal of 100   Latest Reference Range & Units 12/26/23 06:15   Cholesterol,Tot 100 - 199 mg/dL 157   Triglycerides 0 - 149 mg/dL 140   HDL >=40 mg/dL 48   LDL <100 mg/dL 81     The 10-year ASCVD risk score (Stephy JEAN BAPTISTE, et al., 2019) is: 13%      Secondary hypercoagulable state (HCC)  Secondary to eliquis 5mg BID.       Review of Systems: Otherwise negative except for as stated above.      Exam: /78   Pulse 68   Temp 36.3 °C (97.4 °F) (Temporal)   Resp 17   Ht 1.778 m (5' 10\")   Wt 99.6 kg (219 lb 9.6 oz)   SpO2 97%  Body mass index is 31.51 kg/m².    Physical Exam  Vitals reviewed.   Constitutional:       Appearance: Normal appearance.   Eyes:      Extraocular Movements: Extraocular movements intact.   Pulmonary:      Effort: Pulmonary effort is normal.   Neurological:      General: No focal deficit present.      Mental Status: He is alert and oriented to person, place, and time.   Psychiatric:         Mood and Affect: Mood normal.         Behavior: Behavior normal.         Assessment/Plan:  68 y.o. male with the following -    1. PAF (paroxysmal atrial " fibrillation) (HCC)  Chronic, stable.  Continue Eliquis 5 mg twice daily as well as propafenone 150 mg twice daily.  Following up in a couple of weeks with Dr. Parks to determine discontinuing antiarrhythmic.     2. Acquired hypothyroidism  Chronic, not at goal.  Asymptomatic, taking levothyroxine 150 mcg every morning on an empty stomach and prior to any other medications.  Increase to 200 mcg to be taken Monday and Thursday with recheck of TSH and T4 in 6 to 8 weeks.  Discussed with patient that with consideration of discontinuing the propafenone, I would recommend potentially postponing until he gets his labs rechecked to ensure that he is not in a hyperthyroid state with high dosing of Synthroid.  Patient will discuss further with Dr. Parks  - TSH+FREE T4  - levothyroxine (SYNTHROID) 200 MCG Tab; Take 1 Tablet by mouth every Monday and Thursday.  Dispense: 24 Tablet; Refill: 1    3. Mixed hyperlipidemia  Chronic, stable.  Continue pravastatin 20 mg nightly.  ASCVD risk score still slightly elevated at 13%, may need to rotate over to high intensity statin    4. Secondary hypercoagulable state (HCC)  Chronic, stable.  Continue apixaban 5 mg twice daily    5. Colon cancer screening  - [facility] COLON CANCER SCREENING      Follow-up: Return in about 1 month (around 3/1/2024) for lab follow up.    Health Maintenance: Completed      Please note that this dictation was created using voice recognition software. I have made every reasonable attempt to correct obvious errors, but I expect that there are errors of grammar and possibly content that I did not discover before finalizing the note.    Electronically signed by STANISLAV Schneider on February 1, 2024

## 2024-02-01 NOTE — ASSESSMENT & PLAN NOTE
Patient is currently on Synthroid 150 mcg every morning.  He recently had labs updated as below   Latest Reference Range & Units 12/26/23 06:15   TSH 0.380 - 5.330 uIU/mL 8.290 (H)   Free T-4 0.93 - 1.70 ng/dL 1.38   (H): Data is abnormally high    He reports that he takes medication every morning on an empty stomach.

## 2024-02-01 NOTE — ASSESSMENT & PLAN NOTE
Patient is currently on pravastatin 20 mg nightly.  Tolerates dose well without side effects.  LDL below goal of 100   Latest Reference Range & Units 12/26/23 06:15   Cholesterol,Tot 100 - 199 mg/dL 157   Triglycerides 0 - 149 mg/dL 140   HDL >=40 mg/dL 48   LDL <100 mg/dL 81     The 10-year ASCVD risk score (Stephy JEAN BAPTISTE, et al., 2019) is: 13%

## 2024-02-05 ENCOUNTER — HOSPITAL ENCOUNTER (OUTPATIENT)
Facility: MEDICAL CENTER | Age: 69
End: 2024-02-05
Attending: STUDENT IN AN ORGANIZED HEALTH CARE EDUCATION/TRAINING PROGRAM
Payer: MEDICARE

## 2024-02-05 ENCOUNTER — HOSPITAL ENCOUNTER (OUTPATIENT)
Dept: LAB | Facility: MEDICAL CENTER | Age: 69
End: 2024-02-05
Attending: STUDENT IN AN ORGANIZED HEALTH CARE EDUCATION/TRAINING PROGRAM
Payer: MEDICARE

## 2024-02-05 ENCOUNTER — OFFICE VISIT (OUTPATIENT)
Dept: URGENT CARE | Facility: PHYSICIAN GROUP | Age: 69
End: 2024-02-05
Payer: MEDICARE

## 2024-02-05 VITALS
SYSTOLIC BLOOD PRESSURE: 122 MMHG | RESPIRATION RATE: 16 BRPM | HEART RATE: 69 BPM | TEMPERATURE: 97 F | BODY MASS INDEX: 31.7 KG/M2 | OXYGEN SATURATION: 96 % | HEIGHT: 70 IN | WEIGHT: 221.45 LBS | DIASTOLIC BLOOD PRESSURE: 72 MMHG

## 2024-02-05 DIAGNOSIS — R31.9 HEMATURIA, UNSPECIFIED TYPE: ICD-10-CM

## 2024-02-05 DIAGNOSIS — Z92.29 HX OF LONG TERM USE OF BLOOD THINNERS: ICD-10-CM

## 2024-02-05 LAB
ALBUMIN SERPL BCP-MCNC: 4.4 G/DL (ref 3.2–4.9)
ALBUMIN/GLOB SERPL: 1.6 G/DL
ALP SERPL-CCNC: 72 U/L (ref 30–99)
ALT SERPL-CCNC: 52 U/L (ref 2–50)
ANION GAP SERPL CALC-SCNC: 12 MMOL/L (ref 7–16)
APPEARANCE UR: NORMAL
AST SERPL-CCNC: 34 U/L (ref 12–45)
BASOPHILS # BLD AUTO: 1.4 % (ref 0–1.8)
BASOPHILS # BLD: 0.1 K/UL (ref 0–0.12)
BILIRUB SERPL-MCNC: 0.4 MG/DL (ref 0.1–1.5)
BILIRUB UR STRIP-MCNC: NEGATIVE MG/DL
BUN SERPL-MCNC: 16 MG/DL (ref 8–22)
CALCIUM ALBUM COR SERPL-MCNC: 8.7 MG/DL (ref 8.5–10.5)
CALCIUM SERPL-MCNC: 9 MG/DL (ref 8.5–10.5)
CHLORIDE SERPL-SCNC: 108 MMOL/L (ref 96–112)
CO2 SERPL-SCNC: 25 MMOL/L (ref 20–33)
COLOR UR AUTO: NORMAL
CREAT SERPL-MCNC: 0.62 MG/DL (ref 0.5–1.4)
EOSINOPHIL # BLD AUTO: 0.26 K/UL (ref 0–0.51)
EOSINOPHIL NFR BLD: 3.7 % (ref 0–6.9)
ERYTHROCYTE [DISTWIDTH] IN BLOOD BY AUTOMATED COUNT: 40.8 FL (ref 35.9–50)
GFR SERPLBLD CREATININE-BSD FMLA CKD-EPI: 104 ML/MIN/1.73 M 2
GLOBULIN SER CALC-MCNC: 2.8 G/DL (ref 1.9–3.5)
GLUCOSE SERPL-MCNC: 91 MG/DL (ref 65–99)
GLUCOSE UR STRIP.AUTO-MCNC: NEGATIVE MG/DL
HCT VFR BLD AUTO: 49.4 % (ref 42–52)
HGB BLD-MCNC: 16.8 G/DL (ref 14–18)
IMM GRANULOCYTES # BLD AUTO: 0.03 K/UL (ref 0–0.11)
IMM GRANULOCYTES NFR BLD AUTO: 0.4 % (ref 0–0.9)
KETONES UR STRIP.AUTO-MCNC: NEGATIVE MG/DL
LEUKOCYTE ESTERASE UR QL STRIP.AUTO: NEGATIVE
LYMPHOCYTES # BLD AUTO: 2.51 K/UL (ref 1–4.8)
LYMPHOCYTES NFR BLD: 36 % (ref 22–41)
MCH RBC QN AUTO: 31.5 PG (ref 27–33)
MCHC RBC AUTO-ENTMCNC: 34 G/DL (ref 32.3–36.5)
MCV RBC AUTO: 92.5 FL (ref 81.4–97.8)
MONOCYTES # BLD AUTO: 0.51 K/UL (ref 0–0.85)
MONOCYTES NFR BLD AUTO: 7.3 % (ref 0–13.4)
NEUTROPHILS # BLD AUTO: 3.56 K/UL (ref 1.82–7.42)
NEUTROPHILS NFR BLD: 51.2 % (ref 44–72)
NITRITE UR QL STRIP.AUTO: NEGATIVE
NRBC # BLD AUTO: 0 K/UL
NRBC BLD-RTO: 0 /100 WBC (ref 0–0.2)
PH UR STRIP.AUTO: 7.5 [PH] (ref 5–8)
PLATELET # BLD AUTO: 165 K/UL (ref 164–446)
PMV BLD AUTO: 12.3 FL (ref 9–12.9)
POTASSIUM SERPL-SCNC: 4 MMOL/L (ref 3.6–5.5)
PROT SERPL-MCNC: 7.2 G/DL (ref 6–8.2)
PROT UR QL STRIP: 30 MG/DL
RBC # BLD AUTO: 5.34 M/UL (ref 4.7–6.1)
RBC UR QL AUTO: NORMAL
SODIUM SERPL-SCNC: 145 MMOL/L (ref 135–145)
SP GR UR STRIP.AUTO: 1.02
UROBILINOGEN UR STRIP-MCNC: 0.2 MG/DL
WBC # BLD AUTO: 7 K/UL (ref 4.8–10.8)

## 2024-02-05 PROCEDURE — 99214 OFFICE O/P EST MOD 30 MIN: CPT | Performed by: STUDENT IN AN ORGANIZED HEALTH CARE EDUCATION/TRAINING PROGRAM

## 2024-02-05 PROCEDURE — 87086 URINE CULTURE/COLONY COUNT: CPT | Mod: 91

## 2024-02-05 PROCEDURE — 81002 URINALYSIS NONAUTO W/O SCOPE: CPT | Performed by: STUDENT IN AN ORGANIZED HEALTH CARE EDUCATION/TRAINING PROGRAM

## 2024-02-05 PROCEDURE — 87086 URINE CULTURE/COLONY COUNT: CPT

## 2024-02-05 PROCEDURE — 80053 COMPREHEN METABOLIC PANEL: CPT

## 2024-02-05 PROCEDURE — 3078F DIAST BP <80 MM HG: CPT | Performed by: STUDENT IN AN ORGANIZED HEALTH CARE EDUCATION/TRAINING PROGRAM

## 2024-02-05 PROCEDURE — 85025 COMPLETE CBC W/AUTO DIFF WBC: CPT

## 2024-02-05 PROCEDURE — 36415 COLL VENOUS BLD VENIPUNCTURE: CPT

## 2024-02-05 PROCEDURE — 3074F SYST BP LT 130 MM HG: CPT | Performed by: STUDENT IN AN ORGANIZED HEALTH CARE EDUCATION/TRAINING PROGRAM

## 2024-02-05 ASSESSMENT — FIBROSIS 4 INDEX: FIB4 SCORE: 2.08

## 2024-02-05 NOTE — PROGRESS NOTES
Subjective:   Michael Villarreal is a 68 y.o. male who presents for Blood in Urine (X2 days. Had it previously ~1 month ago for a couple hours. His urine gets to a coca cola color near the end of the day. )      HPI:  68-year-old male presents to the urgent care for 2 days of intermittent blood in his urine.  Denies any trauma or injury.  He is on apixaban has not had any dose adjustments.  Does have a history of kidney stones but denies any pain or symptoms consistent with that.  No burning with urination, increased urinary frequency, no urgency, nausea, vomiting, fever, chills, or penile discharge.      Medications:    apixaban Tabs  CALCIUM PO  levothyroxine Tabs  omeprazole  pravastatin Tabs  propafenone Tabs  VITAMIN D PO  VITAMIN-B COMPLEX PO    Allergies: Lipitor [atorvastatin]    Problem List: Michael Villarreal does not have any pertinent problems on file.    Surgical History:  Past Surgical History:   Procedure Laterality Date    [place] LAP, ALLEN RESTRICT PROC, LONGITUDINAL GAS* N/A 12/27/2022    Procedure: LAPAROSCOPIC SLEEVE GASTRECTOMY;  Surgeon: Aubrey Barrera M.D.;  [place];  Service: General    CATARACT PHACO WITH IOL  4/20/2015    Performed by Jakob Garcia M.D. at SURGERY [facility] ORS    CATARACT PHACO WITH IOL  4/1/2015    Performed by Jakob Garcia M.D. at SURGERY SAME DAY [facility] ORS    DUPUYTREN CONTRACTURE RELEASE  7/17/2012    Performed by RAFAEL CLAVILLO at SURGERY [facility] ORS    HAND SURGERY      TONSILLECTOMY         Past Social Hx: Michael Villarreal  reports that he quit smoking about 39 years ago. His smoking use included cigarettes. He started smoking about 44 years ago. He has a 5.0 pack-year smoking history. He quit smokeless tobacco use about 39 years ago.  His smokeless tobacco use included chew. He reports that he does not currently use alcohol. He reports that he does not use drugs.     Past Family Hx:  Michael Villarreal family history includes Heart  "Disease in his mother; Other (age of onset: 68) in his mother.     Problem list, medications, and allergies reviewed by myself today in Epic.     Objective:     /72 (BP Location: Right arm, Patient Position: Sitting, BP Cuff Size: Adult)   Pulse 69   Temp 36.1 °C (97 °F) (Temporal)   Resp 16   Ht 1.778 m (5' 10\")   Wt 100 kg (221 lb 7.2 oz)   SpO2 96%   BMI 31.78 kg/m²     Physical Exam  Vitals reviewed.   Constitutional:       Appearance: Normal appearance.   Eyes:      Conjunctiva/sclera: Conjunctivae normal.   Cardiovascular:      Rate and Rhythm: Normal rate.      Pulses: Normal pulses.   Pulmonary:      Effort: Pulmonary effort is normal.   Abdominal:      Tenderness: There is no abdominal tenderness. There is no right CVA tenderness or left CVA tenderness.   Neurological:      Mental Status: He is alert.       Lab Results/POC Test Results   Results for orders placed or performed in visit on 02/05/24   POCT Urinalysis   Result Value Ref Range    POC Color Red Negative    POC Appearance Turbid Negative    POC Glucose Negative Negative mg/dL    POC Bilirubin Negative Negative mg/dL    POC Ketones Negative Negative mg/dL    POC Specific Gravity 1.020 <1.005 - >1.030    POC Blood Large Negative    POC Urine PH 7.5 5.0 - 8.0    POC Protein 30 Negative mg/dL    POC Urobiligen 0.2 Negative (0.2) mg/dL    POC Nitrites Negative Negative    POC Leukocyte Esterase Negative Negative             Assessment/Plan:     Diagnosis and associated orders:     1. Hematuria, unspecified type  POCT Urinalysis    Referral to Urology    CBC WITH DIFFERENTIAL    Comp Metabolic Panel    ESTIMATED GFR    URINE CULTURE(NEW)      2. Hx of long term use of blood thinners           Comments/MDM:     POCT urinalysis shows large amount of blood but no signs of infection.  Urine culture conducted for further evaluation of underlying infection as a source of his hematuria.  Discussed potential bleeding due to blood thinner use.  Not " having any abdominal pain or signs of kidney stones.  Denies any long history of tobacco use.  Unclear etiology at this time.  CBC/CMP for further evaluation of anemia and kidney function.  Urgent referral to urology for further evaluation and management.  Strict ED precautions including new onset abdominal pain, worsening hematuria, fever, vomiting, or worsening symptoms.         Differential diagnosis, natural history, supportive care, and indications for immediate follow-up discussed.    Advised the patient to follow-up with the primary care physician for recheck, reevaluation, and consideration of further management.    Please note that this dictation was created using voice recognition software. I have made a reasonable attempt to correct obvious errors, but I expect that there are errors of grammar and possibly content that I did not discover before finalizing the note.    Electronically signed by Jay Parrish PA-C.

## 2024-02-07 LAB
BACTERIA UR CULT: NORMAL
SIGNIFICANT IND 70042: NORMAL
SITE SITE: NORMAL
SOURCE SOURCE: NORMAL

## 2024-02-08 LAB
BACTERIA UR CULT: NORMAL
SIGNIFICANT IND 70042: NORMAL
SITE SITE: NORMAL
SOURCE SOURCE: NORMAL

## 2024-02-20 ENCOUNTER — OFFICE VISIT (OUTPATIENT)
Dept: CARDIOLOGY | Facility: MEDICAL CENTER | Age: 69
End: 2024-02-20
Attending: INTERNAL MEDICINE
Payer: MEDICARE

## 2024-02-20 VITALS
OXYGEN SATURATION: 97 % | DIASTOLIC BLOOD PRESSURE: 76 MMHG | RESPIRATION RATE: 12 BRPM | SYSTOLIC BLOOD PRESSURE: 118 MMHG | HEIGHT: 70 IN | BODY MASS INDEX: 31.35 KG/M2 | HEART RATE: 74 BPM | WEIGHT: 219 LBS

## 2024-02-20 DIAGNOSIS — I48.0 PAF (PAROXYSMAL ATRIAL FIBRILLATION) (HCC): ICD-10-CM

## 2024-02-20 PROCEDURE — 93005 ELECTROCARDIOGRAM TRACING: CPT | Performed by: INTERNAL MEDICINE

## 2024-02-20 PROCEDURE — 99212 OFFICE O/P EST SF 10 MIN: CPT | Performed by: INTERNAL MEDICINE

## 2024-02-20 PROCEDURE — 99214 OFFICE O/P EST MOD 30 MIN: CPT | Performed by: INTERNAL MEDICINE

## 2024-02-20 PROCEDURE — 3078F DIAST BP <80 MM HG: CPT | Performed by: INTERNAL MEDICINE

## 2024-02-20 PROCEDURE — 3074F SYST BP LT 130 MM HG: CPT | Performed by: INTERNAL MEDICINE

## 2024-02-20 ASSESSMENT — FIBROSIS 4 INDEX: FIB4 SCORE: 1.94

## 2024-02-20 NOTE — PROGRESS NOTES
Arrhythmia Clinic Note (Established patient)    DOS: 2/20/2024    Chief complaint/Reason for consult: Afib    Interval History: 67 y/o M with pAF, FORREST, on Rhythmol. Doing well. Had 30 min episode nocturnal afib in December. Can't tolerate CPAP.    ROS (+ highlighted in bold):  Constitutional: Fevers/chills/fatigue/weightloss  HEENT: Blurry vision/eye pain/sore throat/hearing loss  Respiratory: Shortness of breath/cough  Cardiovascular: Chest pain/palpitations/edema/orthopnea/syncope  GI: Nausea/vomitting/diarrhea  MSK: Arthralgias/myagias/muscle weakness  Skin: Rash/sores  Neurological: Numbness/tremors/vertigo  Endocrine: Excessive thirst/polyuria/cold intolerance/heat intolerance  Psych: Depression/anxiety    Past Medical History:   Diagnosis Date    Daytime sleepiness     Disorder of thyroid     High cholesterol     Hyperlipidemia     Hypertension     Obesity     Paroxysmal atrial fibrillation (HCC)     Postoperative pain 12/27/2022    Snoring     Unspecified cataract        Past Surgical History:   Procedure Laterality Date    [place] LAP, ALLEN RESTRICT PROC, LONGITUDINAL GAS* N/A 12/27/2022    Procedure: LAPAROSCOPIC SLEEVE GASTRECTOMY;  Surgeon: Aubrey Barrera M.D.;  Location: [facility];  Service: General    CATARACT PHACO WITH IOL  4/20/2015    Performed by Jakob Garcia M.D. at [facility]    CATARACT PHACO WITH IOL  4/1/2015    [facility]    DUPUYTREN CONTRACTURE RELEASE  7/17/2012    Performed by RAFAEL CALVILLO at [facility]    HAND SURGERY      TONSILLECTOMY         Social History     Socioeconomic History    Marital status:      Spouse name: Not on file    Number of children: Not on file    Years of education: Not on file    Highest education level: Not on file   Occupational History    Not on file   Tobacco Use    Smoking status: Former     Current packs/day: 0.00     Average packs/day: 1 pack/day for 5.0  "years (5.0 ttl pk-yrs)     Types: Cigarettes     Start date: 1979     Quit date: 1984     Years since quittin.6    Smokeless tobacco: Former     Types: Chew     Quit date: 1984   Vaping Use    Vaping Use: Never used   Substance and Sexual Activity    Alcohol use: Not Currently     Comment: rarely    Drug use: No    Sexual activity: Yes     Partners: Female     Birth control/protection: None   Other Topics Concern    Not on file   Social History Narrative    Not on file     Social Determinants of Health     Financial Resource Strain: Not on file   Food Insecurity: Not on file   Transportation Needs: Not on file   Physical Activity: Not on file   Stress: Not on file   Social Connections: Not on file   Intimate Partner Violence: Not on file   Housing Stability: Not on file       Family History   Problem Relation Age of Onset    Other Mother 68        kidney CA    Heart Disease Mother         CHF at 86       Allergies   Allergen Reactions    Lipitor [Atorvastatin]      \"Triggered A fib\"       Current Outpatient Medications   Medication Sig Dispense Refill    levothyroxine (SYNTHROID) 200 MCG Tab Take 1 Tablet by mouth every Monday and Thursday. 24 Tablet 1    levothyroxine (SYNTHROID) 150 MCG Tab TAKE ONE TABLET BY MOUTH EVERY MORNING ON AN EMPTY STOMACH 100 Tablet 3    propafenone (RYTHMOL) 150 MG Tab Take 1 Tablet by mouth 2 times a day. 180 Tablet 6    apixaban (ELIQUIS) 5mg Tab TAKE ONE TABLET BY MOUTH TWICE A  Tablet 7    pravastatin (PRAVACHOL) 20 MG Tab Take 1 Tablet by mouth every evening. 90 Tablet 3    B Complex Vitamins (VITAMIN-B COMPLEX PO)       VITAMIN D PO Take  by mouth.      CALCIUM PO Take  by mouth.      omeprazole (PRILOSEC) 20 MG delayed-release capsule Take 1 Capsule by mouth every day. Open capsule and mix in 10cc H2O 30 Capsule 11     No current facility-administered medications for this visit.       Physical Exam:  Vitals:    24 0736   BP: 118/76   BP Location: " "Left arm   Patient Position: Sitting   BP Cuff Size: Adult   Pulse: 74   Resp: 12   SpO2: 97%   Weight: 99.3 kg (219 lb)   Height: 1.778 m (5' 10\")     General appearance: NAD, conversant   Eyes: anicteric sclerae, moist conjunctivae; no lid-lag; PERRLA  HENT: Atraumatic; oropharynx clear with moist mucous membranes and no mucosal ulcerations; normal hard and soft palate  Neck: Trachea midline; FROM, supple, no thyromegaly or lymphadenopathy  Lungs: CTA, with normal respiratory effort and no intercostal retractions  CV: RRR, no MRGs, no JVD  Abdomen: Soft, non-tender; no masses or HSM  Extremities: No peripheral edema or extremity lymphadenopathy  Skin: Normal temperature, turgor and texture; no rash, ulcers or subcutaneous nodules  Psych: Appropriate affect, alert and oriented to person, place and time    Data:  Lipids:   Lab Results   Component Value Date/Time    CHOLSTRLTOT 157 12/26/2023 06:15 AM    TRIGLYCERIDE 140 12/26/2023 06:15 AM    HDL 48 12/26/2023 06:15 AM    LDL 81 12/26/2023 06:15 AM        BMP:  Lab Results   Component Value Date/Time    SODIUM 145 02/05/2024 1559    POTASSIUM 4.0 02/05/2024 1559    CHLORIDE 108 02/05/2024 1559    CO2 25 02/05/2024 1559    GLUCOSE 91 02/05/2024 1559    BUN 16 02/05/2024 1559    CREATININE 0.62 02/05/2024 1559    CALCIUM 9.0 02/05/2024 1559    ANION 12.0 02/05/2024 1559        TSH:   Lab Results   Component Value Date/Time    TSHULTRASEN 8.290 (H) 12/26/2023 0615        THYROXINE (T4):   No results found for: \"FREEDIR\"     CBC:   Lab Results   Component Value Date/Time    WBC 7.0 02/05/2024 03:59 PM    RBC 5.34 02/05/2024 03:59 PM    HEMOGLOBIN 16.8 02/05/2024 03:59 PM    HEMATOCRIT 49.4 02/05/2024 03:59 PM    MCV 92.5 02/05/2024 03:59 PM    MCH 31.5 02/05/2024 03:59 PM    MCHC 34.0 02/05/2024 03:59 PM    RDW 40.8 02/05/2024 03:59 PM    PLATELETCT 165 02/05/2024 03:59 PM    MPV 12.3 02/05/2024 03:59 PM    NEUTSPOLYS 51.20 02/05/2024 03:59 PM    LYMPHOCYTES 36.00 " 02/05/2024 03:59 PM    MONOCYTES 7.30 02/05/2024 03:59 PM    EOSINOPHILS 3.70 02/05/2024 03:59 PM    BASOPHILS 1.40 02/05/2024 03:59 PM    IMMGRAN 0.40 02/05/2024 03:59 PM    NRBC 0.00 02/05/2024 03:59 PM    NEUTS 3.56 02/05/2024 03:59 PM    LYMPHS 2.51 02/05/2024 03:59 PM    MONOS 0.51 02/05/2024 03:59 PM    EOS 0.26 02/05/2024 03:59 PM    BASO 0.10 02/05/2024 03:59 PM    IMMGRANAB 0.03 02/05/2024 03:59 PM    NRBCAB 0.00 02/05/2024 03:59 PM        CBC w/o DIFF  Lab Results   Component Value Date/Time    WBC 7.0 02/05/2024 03:59 PM    RBC 5.34 02/05/2024 03:59 PM    HEMOGLOBIN 16.8 02/05/2024 03:59 PM    MCV 92.5 02/05/2024 03:59 PM    MCH 31.5 02/05/2024 03:59 PM    MCHC 34.0 02/05/2024 03:59 PM    RDW 40.8 02/05/2024 03:59 PM    MPV 12.3 02/05/2024 03:59 PM         EKG interpreted by me: NSR    Impression/Plan:  1. PAF (paroxysmal atrial fibrillation) (HCC)  EKG        pAF  High risk medication use    - Continue Rhythmol, labs reviewed  - Discussed AF ablation, interested only as a last resort  - Can't try discontinuation of daytime Rhythmol if so desired given all Afib events are nocturnal and probably FORREST related.    FV 6 months    Uri Parks MD  Cardiac Electrophysiology

## 2024-02-29 LAB — EKG IMPRESSION: NORMAL

## 2024-02-29 PROCEDURE — 93010 ELECTROCARDIOGRAM REPORT: CPT | Performed by: INTERNAL MEDICINE

## 2024-03-07 ENCOUNTER — OFFICE VISIT (OUTPATIENT)
Dept: UROLOGY | Facility: MEDICAL CENTER | Age: 69
End: 2024-03-07
Payer: MEDICARE

## 2024-03-07 VITALS
WEIGHT: 220.6 LBS | TEMPERATURE: 98.4 F | HEIGHT: 70 IN | BODY MASS INDEX: 31.58 KG/M2 | SYSTOLIC BLOOD PRESSURE: 153 MMHG | DIASTOLIC BLOOD PRESSURE: 82 MMHG | HEART RATE: 67 BPM | OXYGEN SATURATION: 97 %

## 2024-03-07 DIAGNOSIS — R31.0 GROSS HEMATURIA: ICD-10-CM

## 2024-03-07 LAB
APPEARANCE UR: CLEAR
BILIRUB UR STRIP-MCNC: NEGATIVE MG/DL
COLOR UR AUTO: YELLOW
GLUCOSE UR STRIP.AUTO-MCNC: NEGATIVE MG/DL
KETONES UR STRIP.AUTO-MCNC: NEGATIVE MG/DL
LEUKOCYTE ESTERASE UR QL STRIP.AUTO: NEGATIVE
NITRITE UR QL STRIP.AUTO: NEGATIVE
PH UR STRIP.AUTO: 6 [PH] (ref 5–8)
PROT UR QL STRIP: NEGATIVE MG/DL
RBC UR QL AUTO: NEGATIVE
SP GR UR STRIP.AUTO: 1.02
UROBILINOGEN UR STRIP-MCNC: 0.2 MG/DL

## 2024-03-07 PROCEDURE — 3079F DIAST BP 80-89 MM HG: CPT | Performed by: UROLOGY

## 2024-03-07 PROCEDURE — 99203 OFFICE O/P NEW LOW 30 MIN: CPT | Performed by: UROLOGY

## 2024-03-07 PROCEDURE — 81002 URINALYSIS NONAUTO W/O SCOPE: CPT | Performed by: UROLOGY

## 2024-03-07 PROCEDURE — 3077F SYST BP >= 140 MM HG: CPT | Performed by: UROLOGY

## 2024-03-07 ASSESSMENT — FIBROSIS 4 INDEX: FIB4 SCORE: 1.94

## 2024-03-07 NOTE — PROGRESS NOTES
Chief Complaint: hematuria    HPI: Michael Villarreal is a 68 y.o. male with a history of gross hematuria he first noticed at the beginning of February 2024; this occurred a number of times and was painless in nature. This is the first time he has noticed gross hematuria.    He has a history of kidney stones, but had no flank or pelvic pain during this episode of hematuria.     Urinalysis on 2/5/24 was positive for large blood, and urine culture was negative.     He has normal renal function, with his most recent serum creatinine at 0.62.     Tobacco use: sporadic use about 40 years ago, never a regular smoker.      Symptoms:  Frequency: no  Urgency: no  Nocturia: no  Stress incontinence: no  Urge incontinence: no  Dysuria: no  Gross hematuria: no  Weakened stream: no  Strain to empty: no      Past Medical History:  Past Medical History:   Diagnosis Date    Daytime sleepiness     Disorder of thyroid     High cholesterol     Hyperlipidemia     Hypertension     Obesity     Paroxysmal atrial fibrillation (HCC)     Postoperative pain 12/27/2022    Snoring     Unspecified cataract        Past Surgical History:  Past Surgical History:   Procedure Laterality Date    [place] LAP, ALLEN RESTRICT PROC, LONGITUDINAL GAS* N/A 12/27/2022    Procedure: LAPAROSCOPIC SLEEVE GASTRECTOMY;  Surgeon: Aubrey Barrera M.D.;  Location: [facility];  Service: General    CATARACT PHACO WITH IOL  4/20/2015    Performed by Jakob Garcia M.D. at [facility]    CATARACT PHACO WITH IOL  4/1/2015    [facility]    DUPUYTREN CONTRACTURE RELEASE  7/17/2012    Performed by RAFAEL CALVILLO at [facility]    HAND SURGERY      TONSILLECTOMY         Family History:  Family History   Problem Relation Age of Onset    Other Mother 68        kidney CA    Heart Disease Mother         CHF at 86       Social History:  Social History     Socioeconomic History    Marital status:       Spouse name: Not on file    Number of children: Not on file    Years of education: Not on file    Highest education level: Not on file   Occupational History    Not on file   Tobacco Use    Smoking status: Former     Current packs/day: 0.00     Average packs/day: 1 pack/day for 5.0 years (5.0 ttl pk-yrs)     Types: Cigarettes     Start date: 1979     Quit date: 1984     Years since quittin.6    Smokeless tobacco: Former     Types: Chew     Quit date: 1984   Vaping Use    Vaping Use: Never used   Substance and Sexual Activity    Alcohol use: Not Currently     Comment: rarely    Drug use: No    Sexual activity: Yes     Partners: Female     Birth control/protection: None   Other Topics Concern    Not on file   Social History Narrative    Not on file     Social Determinants of Health     Financial Resource Strain: Not on file   Food Insecurity: Not on file   Transportation Needs: Not on file   Physical Activity: Not on file   Stress: Not on file   Social Connections: Not on file   Intimate Partner Violence: Not on file   Housing Stability: Not on file       Medications:  Current Outpatient Medications   Medication Sig Dispense Refill    levothyroxine (SYNTHROID) 200 MCG Tab Take 1 Tablet by mouth every Monday and Thursday. 24 Tablet 1    levothyroxine (SYNTHROID) 150 MCG Tab TAKE ONE TABLET BY MOUTH EVERY MORNING ON AN EMPTY STOMACH 100 Tablet 3    propafenone (RYTHMOL) 150 MG Tab Take 1 Tablet by mouth 2 times a day. 180 Tablet 6    apixaban (ELIQUIS) 5mg Tab TAKE ONE TABLET BY MOUTH TWICE A  Tablet 7    pravastatin (PRAVACHOL) 20 MG Tab Take 1 Tablet by mouth every evening. 90 Tablet 3    B Complex Vitamins (VITAMIN-B COMPLEX PO)       VITAMIN D PO Take  by mouth.      CALCIUM PO Take  by mouth.      omeprazole (PRILOSEC) 20 MG delayed-release capsule Take 1 Capsule by mouth every day. Open capsule and mix in 10cc H2O 30 Capsule 11     No current facility-administered medications  "for this visit.       Allergies:  Allergies   Allergen Reactions    Lipitor [Atorvastatin]      \"Triggered A fib\"       Review of Systems:  Constitutional: Negative for fever, chills and malaise/fatigue.   HENT: Negative for congestion.    Eyes: Negative for pain.   Respiratory: Negative for cough and shortness of breath.    Cardiovascular: Negative for leg swelling.   Gastrointestinal: Negative for nausea, vomiting, abdominal pain and diarrhea.   Genitourinary: Negative for dysuria and hematuria.   Skin: Negative for rash.   Neurological: Negative for dizziness, focal weakness and headaches.   Endo/Heme/Allergies: Does not bruise/bleed easily.   Psychiatric/Behavioral: Negative for depression.  The patient is not nervous/anxious.        Physical Exam:  There were no vitals filed for this visit.    GENERAL: well appearing, well nourished, NAD  RESP: respiratory effort normal  ABDOMEN: soft, nontender, nondistended, no masses or organomegaly. No CVA tenderness.  HERNIAS: no hernias found on exam  SKIN/LYMPH: normal coloration and turgor, no suspicious skin lesions noted  NEURO/PSYCH: alert, oriented, normal speech, no focal findings or movement disorder noted  EXTREMITIES: peripheral pulses normal, no pedal edema, no clubbing or cyanosis  GENITOURINARY: normal male external genitalia, penis is normal, and uncircumcised  RECTAL: Exam Deferred      Data Review:    Labs:  POCT UA   Lab Results   Component Value Date/Time    POCCOLOR Red 02/05/2024 03:00 PM    POCAPPEAR Turbid 02/05/2024 03:00 PM    POCLEUKEST Negative 02/05/2024 03:00 PM    POCNITRITE Negative 02/05/2024 03:00 PM    POCUROBILIGE 0.2 02/05/2024 03:00 PM    POCPROTEIN 30 02/05/2024 03:00 PM    POCURPH 7.5 02/05/2024 03:00 PM    POCBLOOD Large 02/05/2024 03:00 PM    POCSPGRV 1.020 02/05/2024 03:00 PM    POCKETONES Negative 02/05/2024 03:00 PM    POCBILIRUBIN Negative 02/05/2024 03:00 PM    POCGLUCUA Negative 02/05/2024 03:00 PM      CBC   Lab Results "   Component Value Date/Time    WBC 7.0 02/05/2024 1559    RBC 5.34 02/05/2024 1559    HEMOGLOBIN 16.8 02/05/2024 1559    HEMATOCRIT 49.4 02/05/2024 1559    MCV 92.5 02/05/2024 1559    MCH 31.5 02/05/2024 1559    MCHC 34.0 02/05/2024 1559    RDW 40.8 02/05/2024 1559    MPV 12.3 02/05/2024 1559    LYMPHOCYTES 36.00 02/05/2024 1559    LYMPHS 2.51 02/05/2024 1559    MONOCYTES 7.30 02/05/2024 1559    MONOS 0.51 02/05/2024 1559    EOSINOPHILS 3.70 02/05/2024 1559    EOS 0.26 02/05/2024 1559    BASOPHILS 1.40 02/05/2024 1559    BASO 0.10 02/05/2024 1559    NRBC 0.00 02/05/2024 1559     CMP   Lab Results   Component Value Date/Time    SODIUM 145 02/05/2024 1559    POTASSIUM 4.0 02/05/2024 1559    CHLORIDE 108 02/05/2024 1559    CO2 25 02/05/2024 1559    ANION 12.0 02/05/2024 1559    GLUCOSE 91 02/05/2024 1559    BUN 16 02/05/2024 1559    CREATININE 0.62 02/05/2024 1559    GFRCKD 104 02/05/2024 1559    CALCIUM 9.0 02/05/2024 1559    CORRCALC 8.7 02/05/2024 1559    ASTSGOT 34 02/05/2024 1559    ALTSGPT 52 (H) 02/05/2024 1559    ALKPHOSPHAT 72 02/05/2024 1559    TBILIRUBIN 0.4 02/05/2024 1559    ALBUMIN 4.4 02/05/2024 1559    TOTPROTEIN 7.2 02/05/2024 1559    GLOBULIN 2.8 02/05/2024 1559    AGRATIO 1.6 02/05/2024 1559     BMP   Lab Results   Component Value Date/Time    SODIUM 145 02/05/2024 1559    POTASSIUM 4.0 02/05/2024 1559    CHLORIDE 108 02/05/2024 1559    CO2 25 02/05/2024 1559    GLUCOSE 91 02/05/2024 1559    BUN 16 02/05/2024 1559    CREATININE 0.62 02/05/2024 1559    CALCIUM 9.0 02/05/2024 1559     PSA   Lab Results   Component Value Date/Time    PSATOTAL 0.87 12/26/2023 0617       Imaging:   None available      Assessment: 68 y.o. male with a history of painless gross hematuria which lasted for 4 days in early February. Urine culture was negative, and he had no symptoms of urinary tract infection. He has minimal smoking history, and last had a cigarette about 40 years ago.    We discussed the differential diagnosis  of gross hematuria, including both benign and malignant etiologies of the upper or lower urinary tract. To evaluate, we discussed appropriate upper tract imaging and endoscopy of the lower urinary tract.       Plan:    -CT urogram  -Schedule cystoscopy      Yaw Chand MD

## 2024-03-19 DIAGNOSIS — R31.0 GROSS HEMATURIA: ICD-10-CM

## 2024-03-22 ENCOUNTER — HOSPITAL ENCOUNTER (OUTPATIENT)
Dept: RADIOLOGY | Facility: MEDICAL CENTER | Age: 69
End: 2024-03-22
Attending: UROLOGY
Payer: MEDICARE

## 2024-03-22 DIAGNOSIS — R31.0 GROSS HEMATURIA: ICD-10-CM

## 2024-03-22 PROCEDURE — 74178 CT ABD&PLV WO CNTR FLWD CNTR: CPT

## 2024-03-22 PROCEDURE — 700117 HCHG RX CONTRAST REV CODE 255: Performed by: UROLOGY

## 2024-03-22 RX ADMIN — IOHEXOL 100 ML: 350 INJECTION, SOLUTION INTRAVENOUS at 12:20

## 2024-04-11 ENCOUNTER — PROCEDURE VISIT (OUTPATIENT)
Dept: UROLOGY | Facility: MEDICAL CENTER | Age: 69
End: 2024-04-11
Payer: MEDICARE

## 2024-04-11 VITALS
TEMPERATURE: 97.6 F | OXYGEN SATURATION: 97 % | WEIGHT: 212 LBS | SYSTOLIC BLOOD PRESSURE: 130 MMHG | HEIGHT: 70 IN | BODY MASS INDEX: 30.35 KG/M2 | HEART RATE: 83 BPM | DIASTOLIC BLOOD PRESSURE: 87 MMHG

## 2024-04-11 DIAGNOSIS — N20.0 NEPHROLITHIASIS: ICD-10-CM

## 2024-04-11 DIAGNOSIS — R31.0 GROSS HEMATURIA: ICD-10-CM

## 2024-04-11 LAB
APPEARANCE UR: CLEAR
BILIRUB UR STRIP-MCNC: NORMAL MG/DL
COLOR UR AUTO: YELLOW
GLUCOSE UR STRIP.AUTO-MCNC: NORMAL MG/DL
KETONES UR STRIP.AUTO-MCNC: NORMAL MG/DL
LEUKOCYTE ESTERASE UR QL STRIP.AUTO: NORMAL
NITRITE UR QL STRIP.AUTO: NORMAL
PH UR STRIP.AUTO: 5.5 [PH] (ref 5–8)
PROT UR QL STRIP: NORMAL MG/DL
RBC UR QL AUTO: NORMAL
SP GR UR STRIP.AUTO: 1.02
UROBILINOGEN UR STRIP-MCNC: 0.2 MG/DL

## 2024-04-11 PROCEDURE — 81002 URINALYSIS NONAUTO W/O SCOPE: CPT | Performed by: UROLOGY

## 2024-04-11 PROCEDURE — 52000 CYSTOURETHROSCOPY: CPT | Performed by: UROLOGY

## 2024-04-11 RX ORDER — LIDOCAINE HYDROCHLORIDE 20 MG/ML
JELLY TOPICAL ONCE
Status: COMPLETED | OUTPATIENT
Start: 2024-04-11 | End: 2024-04-11

## 2024-04-11 RX ADMIN — LIDOCAINE HYDROCHLORIDE 20 MG: 20 JELLY TOPICAL at 10:27

## 2024-04-11 ASSESSMENT — FIBROSIS 4 INDEX: FIB4 SCORE: 1.94

## 2024-04-11 NOTE — PROGRESS NOTES
Flexible Cystoscopy Report    Patient name: Michael Villarreal    Age: 68 y.o.    Procedure: Flexible cystourethroscopy    Pre-procedure diagnosis: Gross hematuria    Post-procedure diagnosis: Normal bladder and urethra    Procedure indications: Michael Villarreal is a 68 year old male with a history of painless gross hematuria episodes beginning in February 2024. Upper tract evaluation with CT urogram revealed several non-obstructive renal stones bilaterally but no lesions concerning for malignancy. Here today for evaluation of the lower urinary tract with endoscopy.     Procedure details: After providing a urine sample for a urinalysis to rule out active urinary tract infection, the patient was brought to the procedure room and placed in supine position. The external genitalia were then prepped and draped in usual sterile fashion. Lidocaine jelly was administered via the urethral meatus and held in place for approximately two minutes for local anesthesia.    After a procedure time-out to confirm the proper patient, procedure, consent, and availability of all necessary equipment, the case began by inserting a 16-Occitan flexible cystoscope via the urethral meatus. Findings included:    Anterior urethra: normal  Posterior urethra: normal membranous urethra; very mild prostatic hypertrophy without a median lobe  Bladder: Bladder mucosa normal without masses, diverticuli, or trabeculations. No stones or other foreign bodies noted. Bilateral ureteral orifices were identified and found to have efflux of clear yellow urine.     The cystoscope was then carefully withdrawn from the bladder and urethra. The patient was cleaned and dried and allowed to void.     Assessment/Plan:  Normal cystourethroscopy.     Unclear etiology of his 4 days of gross hematuria earlier this year, but no evidence of malignancy found on either upper tract imaging or cystoscopy. Possible he passed a small stone without obstruction/pain.    We  discussed management of his renal stones, including possible elective ureteroscopy and laser lithotripsy. He'd like to wait a bit with surveillance imaging, and follow up in 6 months. May proceed with URS/LL later this year if stones are growing.     Yaw Chand MD

## 2024-05-01 ENCOUNTER — HOSPITAL ENCOUNTER (OUTPATIENT)
Dept: LAB | Facility: MEDICAL CENTER | Age: 69
End: 2024-05-01
Payer: MEDICARE

## 2024-05-01 DIAGNOSIS — Z11.59 NEED FOR HEPATITIS C SCREENING TEST: ICD-10-CM

## 2024-05-01 LAB
HCV AB SER QL: NORMAL
T4 FREE SERPL-MCNC: 1.14 NG/DL (ref 0.93–1.7)
TSH SERPL DL<=0.005 MIU/L-ACNC: 8.39 UIU/ML (ref 0.38–5.33)

## 2024-05-23 ENCOUNTER — APPOINTMENT (OUTPATIENT)
Dept: MEDICAL GROUP | Facility: PHYSICIAN GROUP | Age: 69
End: 2024-05-23
Payer: MEDICARE

## 2024-06-13 ENCOUNTER — OFFICE VISIT (OUTPATIENT)
Dept: MEDICAL GROUP | Facility: PHYSICIAN GROUP | Age: 69
End: 2024-06-13
Payer: MEDICARE

## 2024-06-13 VITALS
TEMPERATURE: 98.1 F | RESPIRATION RATE: 18 BRPM | BODY MASS INDEX: 31.87 KG/M2 | SYSTOLIC BLOOD PRESSURE: 112 MMHG | WEIGHT: 222.6 LBS | HEIGHT: 70 IN | HEART RATE: 83 BPM | DIASTOLIC BLOOD PRESSURE: 66 MMHG | OXYGEN SATURATION: 95 %

## 2024-06-13 DIAGNOSIS — E03.9 ACQUIRED HYPOTHYROIDISM: ICD-10-CM

## 2024-06-13 DIAGNOSIS — M25.811 MASS OF JOINT OF RIGHT SHOULDER: ICD-10-CM

## 2024-06-13 PROCEDURE — 3078F DIAST BP <80 MM HG: CPT

## 2024-06-13 PROCEDURE — 3074F SYST BP LT 130 MM HG: CPT

## 2024-06-13 PROCEDURE — 99214 OFFICE O/P EST MOD 30 MIN: CPT

## 2024-06-13 RX ORDER — LEVOTHYROXINE SODIUM 0.15 MG/1
TABLET ORAL
Qty: 60 TABLET | Refills: 3 | Status: SHIPPED | OUTPATIENT
Start: 2024-06-13

## 2024-06-13 RX ORDER — LEVOTHYROXINE SODIUM 0.2 MG/1
TABLET ORAL
Qty: 90 TABLET | Refills: 3 | Status: SHIPPED | OUTPATIENT
Start: 2024-06-13

## 2024-06-13 ASSESSMENT — FIBROSIS 4 INDEX: FIB4 SCORE: 1.97

## 2024-06-16 ENCOUNTER — HOSPITAL ENCOUNTER (OUTPATIENT)
Dept: RADIOLOGY | Facility: MEDICAL CENTER | Age: 69
End: 2024-06-16
Payer: MEDICARE

## 2024-06-16 DIAGNOSIS — M25.811 MASS OF JOINT OF RIGHT SHOULDER: ICD-10-CM

## 2024-06-16 PROCEDURE — 76882 US LMTD JT/FCL EVL NVASC XTR: CPT | Mod: RT

## 2024-08-07 ENCOUNTER — HOSPITAL ENCOUNTER (OUTPATIENT)
Dept: LAB | Facility: MEDICAL CENTER | Age: 69
End: 2024-08-07
Payer: MEDICARE

## 2024-08-07 LAB
T4 FREE SERPL-MCNC: 1.28 NG/DL (ref 0.93–1.7)
TSH SERPL-ACNC: 6.98 UIU/ML (ref 0.35–5.5)

## 2024-08-07 PROCEDURE — 84443 ASSAY THYROID STIM HORMONE: CPT

## 2024-08-07 PROCEDURE — 36415 COLL VENOUS BLD VENIPUNCTURE: CPT

## 2024-08-07 PROCEDURE — 84439 ASSAY OF FREE THYROXINE: CPT

## 2024-08-13 ENCOUNTER — APPOINTMENT (OUTPATIENT)
Dept: CARDIOLOGY | Facility: MEDICAL CENTER | Age: 69
End: 2024-08-13
Attending: NURSE PRACTITIONER
Payer: MEDICARE

## 2024-08-13 NOTE — PROGRESS NOTES
No chief complaint on file.      Subjective     Michael Villarreal is a 69 y.o. male who presents today for follow-up paroxysmal atrial fibrillation.    He is a prior patient of Dr. Uri Parks.  Arrhythmia history significant for rhythm control with Rythmol.  Additional medical history significant for hypertension, hyperlipidemia, hypothyroidism, FORREST not presently treated with PAP therapy    Past Medical History:   Diagnosis Date    Daytime sleepiness     Disorder of thyroid     High cholesterol     Hyperlipidemia     Hypertension     Obesity     Paroxysmal atrial fibrillation (HCC)     Postoperative pain 2022    Snoring     Unspecified cataract      Past Surgical History:   Procedure Laterality Date    [place] LAP, ALLEN RESTRICT PROC, LONGITUDINAL GAS* N/A 2022    Procedure: LAPAROSCOPIC SLEEVE GASTRECTOMY;  Surgeon: Aubrey Barrera M.D.;  Location: SUR[facility];  Service: General    CATARACT PHACO WITH IOL  2015    Performed by Jakob Garcia M.D. at [facility]    CATARACT PHACO WITH IOL  2015    Performed by Jakob Garcia M.D. at [facility]    DUPUYTREN CONTRACTURE RELEASE  2012    Performed by RAFAEL CALVILLO at SUR[facility]    HAND SURGERY      TONSILLECTOMY       Family History   Problem Relation Age of Onset    Other Mother 68        kidney CA    Heart Disease Mother         CHF at 86     Social History     Socioeconomic History    Marital status:      Spouse name: Not on file    Number of children: Not on file    Years of education: Not on file    Highest education level: Not on file   Occupational History    Not on file   Tobacco Use    Smoking status: Former     Current packs/day: 0.00     Average packs/day: 1 pack/day for 5.0 years (5.0 ttl pk-yrs)     Types: Cigarettes     Start date: 1979     Quit date: 1984     Years since quittin.1    Smokeless tobacco: Former     Types: Chew     Quit date: 1984  "  Vaping Use    Vaping status: Never Used   Substance and Sexual Activity    Alcohol use: Not Currently     Comment: rarely    Drug use: No    Sexual activity: Yes     Partners: Female     Birth control/protection: None   Other Topics Concern    Not on file   Social History Narrative    Not on file     Social Determinants of Health     Financial Resource Strain: Not on file   Food Insecurity: Not on file   Transportation Needs: Not on file   Physical Activity: Not on file   Stress: Not on file   Social Connections: Not on file   Intimate Partner Violence: Not on file   Housing Stability: Not on file     Allergies   Allergen Reactions    Lipitor [Atorvastatin]      \"Triggered A fib\"     Outpatient Encounter Medications as of 8/13/2024   Medication Sig Dispense Refill    levothyroxine (SYNTHROID) 200 MCG Tab Take one tablet 5 days per week 90 Tablet 3    levothyroxine (SYNTHROID) 150 MCG Tab Take one tablet Monday and one tablet Thursday 60 Tablet 3    levothyroxine (SYNTHROID) 200 MCG Tab Take 1 Tablet by mouth every Monday and Thursday. 24 Tablet 1    propafenone (RYTHMOL) 150 MG Tab Take 1 Tablet by mouth 2 times a day. 180 Tablet 6    apixaban (ELIQUIS) 5mg Tab TAKE ONE TABLET BY MOUTH TWICE A  Tablet 7    pravastatin (PRAVACHOL) 20 MG Tab Take 1 Tablet by mouth every evening. 90 Tablet 3    B Complex Vitamins (VITAMIN-B COMPLEX PO)       VITAMIN D PO Take  by mouth.      CALCIUM PO Take  by mouth.      omeprazole (PRILOSEC) 20 MG delayed-release capsule Take 1 Capsule by mouth every day. Open capsule and mix in 10cc H2O 30 Capsule 11     No facility-administered encounter medications on file as of 8/13/2024.     ROS           Objective     There were no vitals taken for this visit.    Physical Exam           Assessment & Plan     No diagnosis found.    Medical Decision Making: Today's Assessment/Status/Plan:                        "

## 2024-08-14 ENCOUNTER — OFFICE VISIT (OUTPATIENT)
Dept: MEDICAL GROUP | Facility: PHYSICIAN GROUP | Age: 69
End: 2024-08-14
Payer: MEDICARE

## 2024-08-14 VITALS
HEART RATE: 74 BPM | OXYGEN SATURATION: 95 % | BODY MASS INDEX: 31.78 KG/M2 | DIASTOLIC BLOOD PRESSURE: 78 MMHG | RESPIRATION RATE: 18 BRPM | SYSTOLIC BLOOD PRESSURE: 126 MMHG | WEIGHT: 222 LBS | HEIGHT: 70 IN | TEMPERATURE: 97.5 F

## 2024-08-14 DIAGNOSIS — M25.811 MASS OF JOINT OF RIGHT SHOULDER: ICD-10-CM

## 2024-08-14 DIAGNOSIS — Z12.11 COLON CANCER SCREENING: ICD-10-CM

## 2024-08-14 DIAGNOSIS — E03.9 ACQUIRED HYPOTHYROIDISM: ICD-10-CM

## 2024-08-14 PROCEDURE — 3074F SYST BP LT 130 MM HG: CPT

## 2024-08-14 PROCEDURE — 99214 OFFICE O/P EST MOD 30 MIN: CPT

## 2024-08-14 PROCEDURE — 3078F DIAST BP <80 MM HG: CPT

## 2024-08-14 RX ORDER — LEVOTHYROXINE SODIUM 200 UG/1
200 TABLET ORAL EVERY MORNING
Qty: 90 TABLET | Refills: 3 | Status: SHIPPED | OUTPATIENT
Start: 2024-08-14

## 2024-08-14 RX ORDER — LEVOTHYROXINE SODIUM 150 UG/1
TABLET ORAL
Qty: 60 TABLET | Refills: 3 | Status: CANCELLED | OUTPATIENT
Start: 2024-08-14

## 2024-08-14 ASSESSMENT — FIBROSIS 4 INDEX: FIB4 SCORE: 1.97

## 2024-08-16 ENCOUNTER — OFFICE VISIT (OUTPATIENT)
Dept: CARDIOLOGY | Facility: MEDICAL CENTER | Age: 69
End: 2024-08-16
Attending: NURSE PRACTITIONER
Payer: MEDICARE

## 2024-08-16 VITALS
RESPIRATION RATE: 16 BRPM | HEART RATE: 68 BPM | BODY MASS INDEX: 31.87 KG/M2 | DIASTOLIC BLOOD PRESSURE: 70 MMHG | OXYGEN SATURATION: 96 % | HEIGHT: 70 IN | WEIGHT: 222.6 LBS | SYSTOLIC BLOOD PRESSURE: 119 MMHG

## 2024-08-16 DIAGNOSIS — Z79.01 CHRONIC ANTICOAGULATION: ICD-10-CM

## 2024-08-16 DIAGNOSIS — I48.0 PAF (PAROXYSMAL ATRIAL FIBRILLATION) (HCC): ICD-10-CM

## 2024-08-16 DIAGNOSIS — Z79.899 LONG TERM CURRENT USE OF ANTIARRHYTHMIC DRUG: ICD-10-CM

## 2024-08-16 DIAGNOSIS — E78.2 MIXED HYPERLIPIDEMIA: ICD-10-CM

## 2024-08-16 LAB — EKG IMPRESSION: NORMAL

## 2024-08-16 PROCEDURE — 3078F DIAST BP <80 MM HG: CPT | Performed by: NURSE PRACTITIONER

## 2024-08-16 PROCEDURE — 99214 OFFICE O/P EST MOD 30 MIN: CPT | Performed by: NURSE PRACTITIONER

## 2024-08-16 PROCEDURE — 99212 OFFICE O/P EST SF 10 MIN: CPT | Performed by: NURSE PRACTITIONER

## 2024-08-16 PROCEDURE — 3074F SYST BP LT 130 MM HG: CPT | Performed by: NURSE PRACTITIONER

## 2024-08-16 PROCEDURE — 93005 ELECTROCARDIOGRAM TRACING: CPT | Performed by: NURSE PRACTITIONER

## 2024-08-16 PROCEDURE — 93010 ELECTROCARDIOGRAM REPORT: CPT | Performed by: INTERNAL MEDICINE

## 2024-08-16 ASSESSMENT — ENCOUNTER SYMPTOMS
CONSTITUTIONAL NEGATIVE: 1
BRUISES/BLEEDS EASILY: 0
WHEEZING: 0
NERVOUS/ANXIOUS: 0
DEPRESSION: 0
ORTHOPNEA: 0
MUSCULOSKELETAL NEGATIVE: 1
GASTROINTESTINAL NEGATIVE: 1
HALLUCINATIONS: 0
EYES NEGATIVE: 1
DIZZINESS: 0
SHORTNESS OF BREATH: 0
PND: 0
CLAUDICATION: 0
PALPITATIONS: 0
RESPIRATORY NEGATIVE: 1
SENSORY CHANGE: 0
NEUROLOGICAL NEGATIVE: 1
NAUSEA: 0

## 2024-08-16 ASSESSMENT — FIBROSIS 4 INDEX: FIB4 SCORE: 1.97

## 2024-08-16 NOTE — ASSESSMENT & PLAN NOTE
- continue current 150mg twice daily dosing of propafenone for rhythm control   - annual BMP and magnesium

## 2024-08-16 NOTE — PROGRESS NOTES
Atrial Fibrillation Follow up Note    DOS: 8/16/2024  [id number]  Michael Villarreal    Chief complaint/Reason for consult: paroxysmal atrial fibrillation: propafenone     HPI: Pt is a 69 y.o. male who presents to the clinic today in follow up for paroxysmal atrial fibrillation: propafenone. Patient has a past medical history significant for but not limited to: hyperlipidemia, paroxysmal atrial fibrillation, h/o FORREST, chronic anticoagulation, hypothyroidism. Patient doing well. Some short sporadic episodes of PAF. Patient tolerating propafenone fine. No chest pain or issues with breathing. Can continue annual follow up with lab work.        Past Medical History:   Diagnosis Date    Daytime sleepiness     Disorder of thyroid     High cholesterol     Hyperlipidemia     Hypertension     Obesity     Paroxysmal atrial fibrillation (HCC)     Postoperative pain 12/27/2022    Snoring     Unspecified cataract        Past Surgical History:   Procedure Laterality Date    AK LAP, ALLEN RESTRICT PROC, LONGITUDINAL GAS* N/A 12/27/2022    Procedure: LAPAROSCOPIC SLEEVE GASTRECTOMY;  Surgeon: Aubrey Barrera M.D.;  Location: [facility];  Service: General    CATARACT PHACO WITH IOL  4/20/2015    Performed by Jakob Garcia M.D. at SURGERY [facility] ORS    CATARACT PHACO WITH IOL  4/1/2015    [facility]    DUPUYTREN CONTRACTURE RELEASE  7/17/2012    Performed by RAFAEL CALVILLO at SURGERY [facility] ORS    HAND SURGERY      TONSILLECTOMY         Social History     Socioeconomic History    Marital status:      Spouse name: Not on file    Number of children: Not on file    Years of education: Not on file    Highest education level: Not on file   Occupational History    Not on file   Tobacco Use    Smoking status: Former     Current packs/day: 0.00     Average packs/day: 1 pack/day for 5.0 years (5.0 ttl pk-yrs)     Types: Cigarettes     Start date: 7/17/1979     Quit  "date: 1984     Years since quittin.1    Smokeless tobacco: Former     Types: Chew     Quit date: 1984   Vaping Use    Vaping status: Never Used   Substance and Sexual Activity    Alcohol use: Not Currently     Comment: rarely    Drug use: No    Sexual activity: Yes     Partners: Female     Birth control/protection: None   Other Topics Concern    Not on file   Social History Narrative    Not on file     Social Determinants of Health     Financial Resource Strain: Not on file   Food Insecurity: Not on file   Transportation Needs: Not on file   Physical Activity: Not on file   Stress: Not on file   Social Connections: Not on file   Intimate Partner Violence: Not on file   Housing Stability: Not on file       Family History   Problem Relation Age of Onset    Other Mother 68        kidney CA    Heart Disease Mother         CHF at 86       Allergies   Allergen Reactions    Lipitor [Atorvastatin]      \"Triggered A fib\"       Current Outpatient Medications   Medication Sig Dispense Refill    levothyroxine (SYNTHROID) 200 MCG Tab Take 1 Tablet by mouth every morning. 90 Tablet 3    levothyroxine (SYNTHROID) 200 MCG Tab Take one tablet 5 days per week 90 Tablet 3    levothyroxine (SYNTHROID) 150 MCG Tab Take one tablet Monday and one tablet Thursday 60 Tablet 3    propafenone (RYTHMOL) 150 MG Tab Take 1 Tablet by mouth 2 times a day. 180 Tablet 6    apixaban (ELIQUIS) 5mg Tab TAKE ONE TABLET BY MOUTH TWICE A  Tablet 7    pravastatin (PRAVACHOL) 20 MG Tab Take 1 Tablet by mouth every evening. 90 Tablet 3    B Complex Vitamins (VITAMIN-B COMPLEX PO)       VITAMIN D PO Take  by mouth.      CALCIUM PO Take  by mouth.      omeprazole (PRILOSEC) 20 MG delayed-release capsule Take 1 Capsule by mouth every day. Open capsule and mix in 10cc H2O 30 Capsule 11     No current facility-administered medications for this visit.       Vitals:    24 1111   BP: 119/70   Pulse: 68   Resp: 16   SpO2: 96% "         Review of Systems   Constitutional: Negative.  Negative for malaise/fatigue.   HENT: Negative.     Eyes: Negative.    Respiratory: Negative.  Negative for shortness of breath and wheezing.    Cardiovascular:  Negative for chest pain, palpitations, orthopnea, claudication, leg swelling and PND.   Gastrointestinal: Negative.  Negative for nausea.   Genitourinary: Negative.    Musculoskeletal: Negative.    Skin: Negative.    Neurological: Negative.  Negative for dizziness and sensory change.   Endo/Heme/Allergies: Negative.  Does not bruise/bleed easily.   Psychiatric/Behavioral:  Negative for depression and hallucinations. The patient is not nervous/anxious.           EKG interpreted by me: Sinus    Physical Exam  Constitutional:       Appearance: Normal appearance.   HENT:      Head: Normocephalic.   Eyes:      Pupils: Pupils are equal, round, and reactive to light.   Neck:      Vascular: No JVD.   Cardiovascular:      Rate and Rhythm: Normal rate and regular rhythm.      Pulses: Normal pulses.      Heart sounds: Normal heart sounds.   Pulmonary:      Effort: Pulmonary effort is normal.      Breath sounds: Normal breath sounds.   Abdominal:      General: Abdomen is flat.      Palpations: Abdomen is soft.   Musculoskeletal:      Cervical back: Normal range of motion.      Right lower leg: No edema.      Left lower leg: No edema.   Skin:     General: Skin is warm and dry.   Neurological:      Mental Status: He is alert and oriented to person, place, and time.   Psychiatric:         Mood and Affect: Mood normal.         Behavior: Behavior normal.          Data:  Lipids:   Lab Results   Component Value Date/Time    CHOLSTRLTOT 157 12/26/2023 06:15 AM    TRIGLYCERIDE 140 12/26/2023 06:15 AM    HDL 48 12/26/2023 06:15 AM    LDL 81 12/26/2023 06:15 AM        BMP:  Lab Results   Component Value Date/Time    SODIUM 145 02/05/2024 1559    POTASSIUM 4.0 02/05/2024 1559    CHLORIDE 108 02/05/2024 1559    CO2 25  "02/05/2024 1559    GLUCOSE 91 02/05/2024 1559    BUN 16 02/05/2024 1559    CREATININE 0.62 02/05/2024 1559    CALCIUM 9.0 02/05/2024 1559    ANION 12.0 02/05/2024 1559       GFR:  Lab Results   Component Value Date/Time    IFAFRICA >60 08/24/2021 0843    IFNOTAFR >60 08/24/2021 0843        TSH:   Lab Results   Component Value Date/Time    TSHULTRASEN 6.980 (H) 08/07/2024 0949       MAGNESIUM:  Lab Results   Component Value Date/Time    MAGNESIUM 2.3 06/03/2021 1012    MAGNESIUM 2.2 12/23/2014 0649    MAGNESIUM 2.3 08/12/2013 2349        THYROXINE (T4):   No results found for: \"FREEDIR\"     CBC:   Lab Results   Component Value Date/Time    WBC 7.0 02/05/2024 03:59 PM    RBC 5.34 02/05/2024 03:59 PM    HEMOGLOBIN 16.8 02/05/2024 03:59 PM    HEMATOCRIT 49.4 02/05/2024 03:59 PM    MCV 92.5 02/05/2024 03:59 PM    MCH 31.5 02/05/2024 03:59 PM    MCHC 34.0 02/05/2024 03:59 PM    RDW 40.8 02/05/2024 03:59 PM    PLATELETCT 165 02/05/2024 03:59 PM    MPV 12.3 02/05/2024 03:59 PM    NEUTSPOLYS 51.20 02/05/2024 03:59 PM    LYMPHOCYTES 36.00 02/05/2024 03:59 PM    MONOCYTES 7.30 02/05/2024 03:59 PM    EOSINOPHILS 3.70 02/05/2024 03:59 PM    BASOPHILS 1.40 02/05/2024 03:59 PM    IMMGRAN 0.40 02/05/2024 03:59 PM    NRBC 0.00 02/05/2024 03:59 PM    NEUTS 3.56 02/05/2024 03:59 PM    LYMPHS 2.51 02/05/2024 03:59 PM    MONOS 0.51 02/05/2024 03:59 PM    EOS 0.26 02/05/2024 03:59 PM    BASO 0.10 02/05/2024 03:59 PM    IMMGRANAB 0.03 02/05/2024 03:59 PM    NRBCAB 0.00 02/05/2024 03:59 PM        CBC w/o DIFF  Lab Results   Component Value Date/Time    WBC 7.0 02/05/2024 03:59 PM    RBC 5.34 02/05/2024 03:59 PM    HEMOGLOBIN 16.8 02/05/2024 03:59 PM    MCV 92.5 02/05/2024 03:59 PM    MCH 31.5 02/05/2024 03:59 PM    MCHC 34.0 02/05/2024 03:59 PM    RDW 40.8 02/05/2024 03:59 PM    MPV 12.3 02/05/2024 03:59 PM       LIVER:  Lab Results   Component Value Date/Time    ALKPHOSPHAT 72 02/05/2024 03:59 PM    ASTSGOT 34 02/05/2024 03:59 PM    " ALTSGPT 52 (H) 02/05/2024 03:59 PM    TBILIRUBIN 0.4 02/05/2024 03:59 PM       BNP:  Lab Results   Component Value Date/Time    BNPBTYPENAT 12 10/28/2014 08:27 PM       PT/INR:  Lab Results   Component Value Date/Time    PROTHROMBTM 12.6 10/28/2014 08:27 PM    INR 0.95 10/28/2014 08:27 PM             Impression/Plan:  Long term current use of antiarrhythmic drug   - continue current 150mg twice daily dosing of propafenone for rhythm control   - annual BMP and magnesium     Chronic anticoagulation   - annual CBC   - continue OAC for stroke protection     PAF (paroxysmal atrial fibrillation) (HCC)   - well controlled with class 1c antiarrhythmic   - continue OAC for stroke protection   - annual follow up    Hyperlipidemia   - continue low dose statin    - annual lipid panel            A total of 32 minutes of time was spent on day of encounter reviewing medical record, performing history and examination, counseling, ordering medication/test/consults, collaborating with referring service, and documentation.    Ned Vega AGACNP-EP  Cardiac Electrophysiology

## 2024-08-16 NOTE — ASSESSMENT & PLAN NOTE
- well controlled with class 1c antiarrhythmic   - continue OAC for stroke protection   - annual follow up

## 2024-08-20 ASSESSMENT — PATIENT HEALTH QUESTIONNAIRE - PHQ9
1. LITTLE INTEREST OR PLEASURE IN DOING THINGS: NOT AT ALL
2. FEELING DOWN, DEPRESSED, IRRITABLE, OR HOPELESS: NOT AT ALL

## 2024-08-20 ASSESSMENT — ACTIVITIES OF DAILY LIVING (ADL): BATHING_REQUIRES_ASSISTANCE: 0

## 2024-08-20 ASSESSMENT — ENCOUNTER SYMPTOMS: GENERAL WELL-BEING: GOOD

## 2024-08-21 ASSESSMENT — PATIENT HEALTH QUESTIONNAIRE - PHQ9: CLINICAL INTERPRETATION OF PHQ2 SCORE: 0

## 2024-08-22 ENCOUNTER — OFFICE VISIT (OUTPATIENT)
Dept: FAMILY PLANNING/WOMEN'S HEALTH CLINIC | Facility: PHYSICIAN GROUP | Age: 69
End: 2024-08-22
Payer: MEDICARE

## 2024-08-22 VITALS
SYSTOLIC BLOOD PRESSURE: 120 MMHG | BODY MASS INDEX: 31.78 KG/M2 | HEIGHT: 70 IN | WEIGHT: 222 LBS | DIASTOLIC BLOOD PRESSURE: 80 MMHG

## 2024-08-22 DIAGNOSIS — R12 HEARTBURN: ICD-10-CM

## 2024-08-22 DIAGNOSIS — E03.9 ACQUIRED HYPOTHYROIDISM: ICD-10-CM

## 2024-08-22 DIAGNOSIS — I77.810 ECTATIC THORACIC AORTA (HCC): ICD-10-CM

## 2024-08-22 DIAGNOSIS — E78.2 MIXED HYPERLIPIDEMIA: ICD-10-CM

## 2024-08-22 DIAGNOSIS — E66.09 CLASS 1 OBESITY DUE TO EXCESS CALORIES WITH SERIOUS COMORBIDITY AND BODY MASS INDEX (BMI) OF 31.0 TO 31.9 IN ADULT: ICD-10-CM

## 2024-08-22 DIAGNOSIS — I48.0 PAF (PAROXYSMAL ATRIAL FIBRILLATION) (HCC): ICD-10-CM

## 2024-08-22 DIAGNOSIS — D68.69 SECONDARY HYPERCOAGULABLE STATE (HCC): ICD-10-CM

## 2024-08-22 PROCEDURE — 3079F DIAST BP 80-89 MM HG: CPT

## 2024-08-22 PROCEDURE — G0439 PPPS, SUBSEQ VISIT: HCPCS

## 2024-08-22 PROCEDURE — 3074F SYST BP LT 130 MM HG: CPT

## 2024-08-22 PROCEDURE — 1126F AMNT PAIN NOTED NONE PRSNT: CPT

## 2024-08-22 SDOH — ECONOMIC STABILITY: FOOD INSECURITY: WITHIN THE PAST 12 MONTHS, THE FOOD YOU BOUGHT JUST DIDN'T LAST AND YOU DIDN'T HAVE MONEY TO GET MORE.: NEVER TRUE

## 2024-08-22 SDOH — ECONOMIC STABILITY: INCOME INSECURITY: IN THE LAST 12 MONTHS, WAS THERE A TIME WHEN YOU WERE NOT ABLE TO PAY THE MORTGAGE OR RENT ON TIME?: NO

## 2024-08-22 SDOH — ECONOMIC STABILITY: FOOD INSECURITY: WITHIN THE PAST 12 MONTHS, YOU WORRIED THAT YOUR FOOD WOULD RUN OUT BEFORE YOU GOT MONEY TO BUY MORE.: NEVER TRUE

## 2024-08-22 SDOH — ECONOMIC STABILITY: INCOME INSECURITY: HOW HARD IS IT FOR YOU TO PAY FOR THE VERY BASICS LIKE FOOD, HOUSING, MEDICAL CARE, AND HEATING?: NOT HARD AT ALL

## 2024-08-22 SDOH — ECONOMIC STABILITY: HOUSING INSECURITY: AT ANY TIME IN THE PAST 12 MONTHS, WERE YOU HOMELESS OR LIVING IN A SHELTER (INCLUDING NOW)?: NO

## 2024-08-22 SDOH — ECONOMIC STABILITY: TRANSPORTATION INSECURITY
IN THE PAST 12 MONTHS, HAS LACK OF TRANSPORTATION KEPT YOU FROM MEETINGS, WORK, OR FROM GETTING THINGS NEEDED FOR DAILY LIVING?: NO

## 2024-08-22 SDOH — ECONOMIC STABILITY: TRANSPORTATION INSECURITY
IN THE PAST 12 MONTHS, HAS THE LACK OF TRANSPORTATION KEPT YOU FROM MEDICAL APPOINTMENTS OR FROM GETTING MEDICATIONS?: NO

## 2024-08-22 SDOH — ECONOMIC STABILITY: HOUSING INSECURITY: IN THE PAST 12 MONTHS, HOW MANY TIMES HAVE YOU MOVED WHERE YOU WERE LIVING?: 1

## 2024-08-22 ASSESSMENT — FIBROSIS 4 INDEX: FIB4 SCORE: 1.97

## 2024-08-22 ASSESSMENT — PAIN SCALES - GENERAL: PAINLEVEL: NO PAIN

## 2024-08-22 NOTE — ASSESSMENT & PLAN NOTE
Chronic, stable. The patient is being followed by cardiology. He indicates that he is feeling well and denies any symptoms referable to the above diagnosis. Specifically denies chest pain, palpitations, dyspnea, orthopnea, PND or peripheral edema.  Continue with current defined treatment plan: propafenone (RYTHMOL) 150 MG Tab, apixaban (ELIQUIS) 5mg Tab. Follow-up at least annually.

## 2024-08-22 NOTE — ASSESSMENT & PLAN NOTE
Chronic, stable. The patient has a diagnosis of paroxysmal atrial fibrillation requiring chronic anticoagulation. The patient is being followed by cardiology. Continue with current defined treatment plan: apixaban (ELIQUIS) 5mg Tab . Follow-up at least annually.

## 2024-08-22 NOTE — ASSESSMENT & PLAN NOTE
Chronic, stable. The patient reports that he walks daily.  Discussed eating a diet that contains many vegetables, fruits, and whole grains; includes low-fat dairy products, poultry,  fish, beans, non-tropical vegetable oils and nuts; and limit intake of sweets, sugar-sweetened drinks and red meats.   Follow-up at least annually.

## 2024-08-22 NOTE — ASSESSMENT & PLAN NOTE
"Chronic, stable.  Noted on 9/15/22 ECHO \"The aortic root is dilated with a diameter of 3.7 cm.\"  Continue with current defined treatment plan: pravastatin (PRAVACHOL) 20 MG Tab. Follow-up at least annually.    "

## 2024-08-22 NOTE — ASSESSMENT & PLAN NOTE
Chronic, stable. The patient denies any symptoms at this time. Continue with current defined treatment plan: levothyroxine (SYNTHROID) 200 MCG Tab . Follow-up at least annually.

## 2024-08-22 NOTE — ASSESSMENT & PLAN NOTE
Chronic, stable. The patient reports that his symptoms have improved since his gastric bypass surgery in 2022.  Continue with current defined treatment plan: Omeprazole Magnesium (PRILOSEC PO) as needed. Follow-up at least annually.

## 2024-08-22 NOTE — ASSESSMENT & PLAN NOTE
Chronic, stable. The patient denies any side effects from the current medication. Continue with current defined treatment plan: pravastatin (PRAVACHOL) 20 MG Tab. Follow-up at least annually.

## 2024-08-22 NOTE — PROGRESS NOTES
Comprehensive Health Assessment Program     Michael Villarreal is a 69 y.o. here for his comprehensive health assessment.    Patient Active Problem List    Diagnosis Date Noted    Ectatic thoracic aorta (HCC) 08/22/2024    Long term current use of antiarrhythmic drug 08/16/2024    Secondary hypercoagulable state (HCC) 02/01/2024    Heartburn 04/11/2023    FORREST (obstructive sleep apnea) 02/03/2023    Class 1 obesity due to excess calories with serious comorbidity and body mass index (BMI) of 31.0 to 31.9 in adult 12/27/2022    Acquired hypothyroidism 10/18/2022    Elevated fasting blood sugar 10/18/2022    Chronic anticoagulation 12/10/2018    PAF (paroxysmal atrial fibrillation) (HCC) 05/19/2016    Dysmetabolic syndrome X 05/14/2015    Mechanical complication due to ocular lens prosthesis 04/20/2015    Senile nuclear sclerosis 04/01/2015    Hyperlipidemia 08/16/2013       Current Outpatient Medications   Medication Sig Dispense Refill    levothyroxine (SYNTHROID) 200 MCG Tab Take 1 Tablet by mouth every morning. 90 Tablet 3    levothyroxine (SYNTHROID) 200 MCG Tab Take one tablet 5 days per week 90 Tablet 3    levothyroxine (SYNTHROID) 150 MCG Tab Take one tablet Monday and one tablet Thursday 60 Tablet 3    propafenone (RYTHMOL) 150 MG Tab Take 1 Tablet by mouth 2 times a day. 180 Tablet 6    apixaban (ELIQUIS) 5mg Tab TAKE ONE TABLET BY MOUTH TWICE A  Tablet 7    pravastatin (PRAVACHOL) 20 MG Tab Take 1 Tablet by mouth every evening. 90 Tablet 3    B Complex Vitamins (VITAMIN-B COMPLEX PO)       VITAMIN D PO Take  by mouth.      CALCIUM PO Take  by mouth.      omeprazole (PRILOSEC) 20 MG delayed-release capsule Take 1 Capsule by mouth every day. Open capsule and mix in 10cc H2O 30 Capsule 11     No current facility-administered medications for this visit.          Current supplements as per medication list.     Allergies:   Lipitor [atorvastatin]  Social History     Tobacco Use    Smoking status: Former      Current packs/day: 0.00     Average packs/day: 1 pack/day for 5.0 years (5.0 ttl pk-yrs)     Types: Cigarettes     Start date: 1979     Quit date: 1984     Years since quittin.1    Smokeless tobacco: Former     Types: Chew     Quit date: 1984   Vaping Use    Vaping status: Never Used   Substance Use Topics    Alcohol use: Not Currently     Comment: rarely    Drug use: No     Family History   Problem Relation Age of Onset    Other Mother 68        kidney CA    Heart Disease Mother         CHF at 86     Michael  has a past medical history of Daytime sleepiness, Disorder of thyroid, High cholesterol, Hyperlipidemia, Hypertension, Obesity, Paroxysmal atrial fibrillation (HCC), Postoperative pain (2022), Snoring, and Unspecified cataract.    He has no past medical history of Gasping for breath, Insomnia, or Morning headache.   Past Surgical History:   Procedure Laterality Date    DC LAP, ALLEN RESTRICT PROC, LONGITUDINAL GAS* N/A 2022    Procedure: LAPAROSCOPIC SLEEVE GASTRECTOMY;  Surgeon: Aubrey Barrera M.D.;  Location: SURGERY [facility];  Service: General    CATARACT PHACO WITH IOL  2015    Performed by Jakob Garcia M.D. at SURGERY SURGICAL [facility] ORS    CATARACT PHACO WITH IOL  2015    Performed by Jakob Garcia M.D. at SURGERY SAME DAY [facility] ORS    DUPUYTREN CONTRACTURE RELEASE  2012    Performed by RAFAEL CALVILLO at SURGERY [facility] ORS    HAND SURGERY      TONSILLECTOMY         Screening:  In the last six months have you experienced any leakage of urine? No    Depression Screening  Little interest or pleasure in doing things?  0 - not at all  Feeling down, depressed , or hopeless? 0 - not at all  Patient Health Questionnaire Score: 0     If depressive symptoms identified deferred to follow up visit unless specifically addressed in assessment and plan.    Interpretation of PHQ-9 Total Score   Score Severity   1-4 No Depression   5-9 Mild Depression   10-14  Moderate Depression   15-19 Moderately Severe Depression   20-27 Severe Depression    Screening for Cognitive Impairment  Do you or any of your friends or family members have any concern about your memory? Yes  Three Minute Recall (Leader, Season, Table) 3/3    Marshall clock face with all 12 numbers and set the hands to show 10 minutes after 11.  Yes Marshall clock with all numbers, time is correct   Cognitive concerns identified deferred for follow up unless specifically addressed in assessment and plan.    Fall Risk Assessment  Has the patient had two or more falls in the last year or any fall with injury in the last year?  No    Safety Assessment  Do you always wear your seatbelt?  Yes  Any changes to home needed to function safely? No  Difficulty hearing.  No  Patient counseled about all safety risks that were identified.    Functional Assessment ADLs  Are there any barriers preventing you from cooking for yourself or meeting nutritional needs?  No.    Are there any barriers preventing you from driving safely or obtaining transportation?  No.    Are there any barriers preventing you from using a telephone or calling for help?  No    Are there any barriers preventing you from shopping?  No.    Are there any barriers preventing you from taking care of your own finances?  No    Are there any barriers preventing you from managing your medications?  No    Are there any barriers preventing you from showering, bathing or dressing yourself? No    Are there any barriers preventing you from doing housework or laundry? No  Are there any barriers preventing you from using the toilet?No  Are you currently engaging in any exercise or physical activity?  Yes.      Self-Assessment of Health  What is your perception of your health? Good    Do you sleep more than six hours a night? Yes    In the past 7 days, how much did pain keep you from doing your normal work? None    Do you spend quality time with family or friends (virtually or in  person)? Yes    Do you usually eat a heart healthy diet that constists of a variety of fruits, vegetables, whole grains and fiber? Yes    Do you eat foods high in fat and/or Fast Food more than three times per week? No    How concerned are you that your medical conditions are not being well managed? Not at all    Are you worried that in the next 2 months, you may not have stable housing that you own, rent, or stay in as part of a household? No      Advance Care Planning  Do you have an Advance Directive, Living Will, Durable Power of , or POLST? Yes          is not on file - instructed patient to bring in a copy to scan into their chart      Health Maintenance Summary            Overdue - IMM DTaP/Tdap/Td Vaccine (1 - Tdap) Never done      No completion history exists for this topic.              Scheduled - Abdominal Aortic Aneurysm (AAA) Screening (Once) Scheduled for 9/17/2024      No completion history exists for this topic.              Overdue - Pneumococcal Vaccine: 65+ Years (1 of 1 - PCV) Never done      No completion history exists for this topic.              Overdue - Zoster (Shingles) Vaccines (2 of 2) Overdue since 11/24/2020 09/29/2020  Imm Admin: Zoster Vaccine Recombinant (RZV) (SHINGRIX)              Overdue - COVID-19 Vaccine (6 - 2023-24 season) Overdue since 9/1/2023 02/08/2022  Imm Admin: MODERNA SARS-COV-2 VACCINE (12+)    12/17/2021  Imm Admin: MODERNA SARS-COV-2 VACCINE (12+)    04/27/2021  Imm Admin: MODERNA SARS-COV-2 VACCINE (12+)    04/06/2021  Imm Admin: MODERNA SARS-COV-2 VACCINE (12+)    03/08/2021  Imm Admin: MODERNA SARS-COV-2 VACCINE (12+)    Only the first 5 history entries have been loaded, but more history exists.              Influenza Vaccine (1) Next due on 9/1/2024      10/19/2023  Imm Admin: Influenza Vaccine Adult HD    10/10/2023  Imm Admin: Influenza, Unspecified - HISTORICAL DATA    10/18/2022  Imm Admin: Influenza Vaccine Adult HD              Annual  "Wellness Visit (Yearly) Next due on 8/22/2025 08/22/2024  Level of Service: DE ANNUAL WELLNESS VISIT-INCLUDES PPPS SUBSEQUE*              Ordered - Colorectal Cancer Screening (Colon Cancer Screening Cologuard Stool (FIT DNA) - Preferred) Ordered on 8/14/2024 02/23/2024  COLOGUARD COLON CANCER SCREENING    02/15/2013  Colonoscopy (Done)              Hepatitis C Screening  Completed      05/01/2024  Hepatitis C Antibody component of HEP C VIRUS ANTIBODY              Hepatitis A Vaccine (Hep A) (Series Information) Aged Out      No completion history exists for this topic.              HPV Vaccines (Series Information) Aged Out      No completion history exists for this topic.              Polio Vaccine (Inactivated Polio) (Series Information) Aged Out      No completion history exists for this topic.              Meningococcal Immunization (Series Information) Aged Out      No completion history exists for this topic.              Discontinued - Hepatitis B Vaccine (Hep B)  Discontinued      No completion history exists for this topic.                    Patient Care Team:  PATRICIA Parker as PCP - General (Internal Medicine)  PATRICIA Parker as PCP - H Paneled (Internal Medicine)  Preferred (DME Supplier)      Financial Resource Strain: Low Risk  (8/22/2024)    Overall Financial Resource Strain (CARDIA)     Difficulty of Paying Living Expenses: Not hard at all      Transportation Needs: No Transportation Needs (8/22/2024)    PRAPARE - Transportation     Lack of Transportation (Medical): No     Lack of Transportation (Non-Medical): No      Food Insecurity: No Food Insecurity (8/22/2024)    Hunger Vital Sign     Worried About Running Out of Food in the Last Year: Never true     Ran Out of Food in the Last Year: Never true        Encounter Vitals  Blood Pressure : 120/80  O2 Delivery Device: None - Room Air  Weight: 101 kg (222 lb)  Height: 177.8 cm (5' 10\")  BMI (Calculated): " 31.85  Pain Score: No pain  DME  O2 Delivery Device: None - Room Air     Alert, oriented in no acute distress.  Eye contact is good, speech goal directed, affect calm.    Assessment and Plan. The following treatment and monitoring plan is recommended:  Acquired hypothyroidism  Chronic, stable. The patient denies any symptoms at this time. Continue with current defined treatment plan: levothyroxine (SYNTHROID) 200 MCG Tab . Follow-up at least annually.      Hyperlipidemia  Chronic, stable. The patient denies any side effects from the current medication. Continue with current defined treatment plan: pravastatin (PRAVACHOL) 20 MG Tab. Follow-up at least annually.      PAF (paroxysmal atrial fibrillation) (HCC)  Chronic, stable. The patient is being followed by cardiology. He indicates that he is feeling well and denies any symptoms referable to the above diagnosis. Specifically denies chest pain, palpitations, dyspnea, orthopnea, PND or peripheral edema.  Continue with current defined treatment plan: propafenone (RYTHMOL) 150 MG Tab, apixaban (ELIQUIS) 5mg Tab. Follow-up at least annually.      Secondary hypercoagulable state (HCC)  Chronic, stable. The patient has a diagnosis of paroxysmal atrial fibrillation requiring chronic anticoagulation. The patient is being followed by cardiology. Continue with current defined treatment plan: apixaban (ELIQUIS) 5mg Tab . Follow-up at least annually.      Class 1 obesity due to excess calories with serious comorbidity and body mass index (BMI) of 31.0 to 31.9 in adult  Chronic, stable. The patient reports that he walks daily.  Discussed eating a diet that contains many vegetables, fruits, and whole grains; includes low-fat dairy products, poultry,  fish, beans, non-tropical vegetable oils and nuts; and limit intake of sweets, sugar-sweetened drinks and red meats.   Follow-up at least annually.      Heartburn  Chronic, stable. The patient reports that his symptoms have improved  "since his gastric bypass surgery in 2022.  Continue with current defined treatment plan: Omeprazole Magnesium (PRILOSEC PO) as needed. Follow-up at least annually.      Ectatic thoracic aorta (HCC)  Chronic, stable.  Noted on 9/15/22 ECHO \"The aortic root is dilated with a diameter of 3.7 cm.\"  Continue with current defined treatment plan: pravastatin (PRAVACHOL) 20 MG Tab. Follow-up at least annually.      Services suggested: No services needed at this time  Health Care Screening: Age-appropriate preventive services recommended by USPTF and ACIP covered by Medicare were discussed today. Services ordered if indicated and agreed upon by the patient.  Referrals offered: Community-based lifestyle interventions to reduce health risks and promote self-management and wellness, fall prevention, nutrition, physical activity, tobacco-use cessation, weight loss, and mental health services as per orders if indicated.    Discussion today about general wellness and lifestyle habits:    Prevent falls and reduce trip hazards; Cautioned about securing or removing rugs.  Have a working fire alarm and carbon monoxide detector.  Engage in regular physical activity and social activities.    Follow-up: Return for appointment with Primary Care Provider as needed.         "

## 2024-08-26 ENCOUNTER — PATIENT MESSAGE (OUTPATIENT)
Dept: HEALTH INFORMATION MANAGEMENT | Facility: OTHER | Age: 69
End: 2024-08-26

## 2024-08-26 NOTE — PROGRESS NOTES
Verbal consent was acquired by the patient to use [employer] ambient listening note generation during this visit     Subjective:     HPI:   History of Present Illness  The patient is a 69-year-old male presenting for follow-up after his thyroid medication was adjusted due to hypothyroidism.    He is currently on Synthroid 200 mcg on Mondays and Thursdays and 150 mcg the other 5 days of the week.    TSH still not at goal.     Reviewed US of right shoulder,indicating possible lipoma but not definitive     Assessment & Plan:     Problem List Items Addressed This Visit       Acquired hypothyroidism    Relevant Medications    levothyroxine (SYNTHROID) 200 MCG Tab    Other Relevant Orders    TSH+FREE T4     Other Visit Diagnoses       Mass of joint of right shoulder        Relevant Orders    MR-HUMERUS W/O RIGHT    Colon cancer screening        Relevant Orders    COLOGUARD COLON CANCER SCREENING              1. Acquired hypothyroidism  Chronic, not at goal.    Increase to 200mcg daily, check labs in 6 weeks.   - levothyroxine (SYNTHROID) 200 MCG Tab; Take 1 Tablet by mouth every morning.  Dispense: 90 Tablet; Refill: 3  - TSH+FREE T4    2. Mass of joint of right shoulder  Undiagnosed concern with uncertain prognosis.   Likely lipoma but large and causing discomfort. MRI for more definitive imaging   - MR-HUMERUS W/O RIGHT; Future    3. Colon cancer screening  - COLOGUARD COLON CANCER SCREENING        Health Maintenance: Orders placed as applicable to patient     Objective:     Exam:  Objective:  Vitals:    08/14/24 1518   BP: 126/78   Pulse: 74   Resp: 18   Temp: 36.4 °C (97.5 °F)   SpO2: 95%     Physical Exam  Physical Exam  Skin:            Comments: Cystic mass, varying degress of firm vs soft feeling          Constitutional:       Appearance: Normal appearance.   Eyes:      Extraocular Movements: Extraocular movements intact.   Pulmonary:      Effort: Pulmonary effort is normal.   Neurological:      General: No focal  deficit present.      Mental Status: She is alert and oriented to person, place, and time.   Psychiatric:         Mood and Affect: Mood normal.         Behavior: Behavior normal.       No follow-ups on file.    Please note that this dictation was created using voice recognition software. I have made every reasonable attempt to correct obvious errors, but I expect that there are errors of grammar and possibly content that I did not discover before finalizing the note.

## 2024-09-16 ENCOUNTER — HOSPITAL ENCOUNTER (OUTPATIENT)
Dept: RADIOLOGY | Facility: MEDICAL CENTER | Age: 69
End: 2024-09-16
Attending: UROLOGY
Payer: MEDICARE

## 2024-09-16 DIAGNOSIS — N20.0 NEPHROLITHIASIS: ICD-10-CM

## 2024-09-16 PROCEDURE — 74018 RADEX ABDOMEN 1 VIEW: CPT

## 2024-09-17 ENCOUNTER — APPOINTMENT (OUTPATIENT)
Dept: RADIOLOGY | Facility: MEDICAL CENTER | Age: 69
End: 2024-09-17
Payer: MEDICARE

## 2024-09-19 DIAGNOSIS — R19.5 POSITIVE COLORECTAL CANCER SCREENING USING COLOGUARD TEST: ICD-10-CM

## 2024-10-10 ENCOUNTER — HOSPITAL ENCOUNTER (OUTPATIENT)
Dept: RADIOLOGY | Facility: MEDICAL CENTER | Age: 69
End: 2024-10-10
Payer: MEDICARE

## 2024-10-10 DIAGNOSIS — M25.811 MASS OF JOINT OF RIGHT SHOULDER: ICD-10-CM

## 2024-10-10 PROCEDURE — 73218 MRI UPPER EXTREMITY W/O DYE: CPT | Mod: RT

## 2024-10-17 ENCOUNTER — APPOINTMENT (OUTPATIENT)
Dept: UROLOGY | Facility: MEDICAL CENTER | Age: 69
End: 2024-10-17
Payer: MEDICARE

## 2024-10-17 DIAGNOSIS — N20.0 RENAL STONES: ICD-10-CM

## 2024-10-17 PROCEDURE — 99212 OFFICE O/P EST SF 10 MIN: CPT | Performed by: UROLOGY

## 2024-10-18 ENCOUNTER — HOSPITAL ENCOUNTER (OUTPATIENT)
Dept: LAB | Facility: MEDICAL CENTER | Age: 69
End: 2024-10-18
Payer: MEDICARE

## 2024-10-18 LAB
T4 FREE SERPL-MCNC: 1.58 NG/DL (ref 0.93–1.7)
TSH SERPL-ACNC: 5.09 UIU/ML (ref 0.35–5.5)

## 2024-10-18 PROCEDURE — 36415 COLL VENOUS BLD VENIPUNCTURE: CPT

## 2024-10-18 PROCEDURE — 84443 ASSAY THYROID STIM HORMONE: CPT

## 2024-10-18 PROCEDURE — 84439 ASSAY OF FREE THYROXINE: CPT

## 2024-11-04 ENCOUNTER — OFFICE VISIT (OUTPATIENT)
Dept: MEDICAL GROUP | Facility: PHYSICIAN GROUP | Age: 69
End: 2024-11-04
Payer: MEDICARE

## 2024-11-04 VITALS
RESPIRATION RATE: 17 BRPM | HEIGHT: 70 IN | BODY MASS INDEX: 31.41 KG/M2 | WEIGHT: 219.4 LBS | OXYGEN SATURATION: 97 % | DIASTOLIC BLOOD PRESSURE: 82 MMHG | SYSTOLIC BLOOD PRESSURE: 126 MMHG | HEART RATE: 79 BPM | TEMPERATURE: 96.9 F

## 2024-11-04 DIAGNOSIS — E78.2 MIXED HYPERLIPIDEMIA: ICD-10-CM

## 2024-11-04 DIAGNOSIS — I48.0 PAF (PAROXYSMAL ATRIAL FIBRILLATION) (HCC): ICD-10-CM

## 2024-11-04 DIAGNOSIS — R19.5 POSITIVE COLORECTAL CANCER SCREENING USING COLOGUARD TEST: ICD-10-CM

## 2024-11-04 DIAGNOSIS — E03.9 ACQUIRED HYPOTHYROIDISM: ICD-10-CM

## 2024-11-04 DIAGNOSIS — R22.31 MASS OF SKIN OF RIGHT SHOULDER: ICD-10-CM

## 2024-11-04 PROCEDURE — 99214 OFFICE O/P EST MOD 30 MIN: CPT

## 2024-11-04 PROCEDURE — 3074F SYST BP LT 130 MM HG: CPT

## 2024-11-04 PROCEDURE — 3079F DIAST BP 80-89 MM HG: CPT

## 2024-11-04 RX ORDER — PRAVASTATIN SODIUM 20 MG
20 TABLET ORAL NIGHTLY
Qty: 90 TABLET | Refills: 3 | Status: SHIPPED | OUTPATIENT
Start: 2024-11-04

## 2024-11-04 RX ORDER — PROPAFENONE HYDROCHLORIDE 150 MG/1
150 TABLET, COATED ORAL 2 TIMES DAILY
Qty: 180 TABLET | Refills: 6 | Status: SHIPPED | OUTPATIENT
Start: 2024-11-04

## 2024-11-04 ASSESSMENT — FIBROSIS 4 INDEX: FIB4 SCORE: 1.97

## 2024-11-05 NOTE — PROGRESS NOTES
Verbal consent was acquired by the patient to use [employer] ambient listening note generation during this visit     Subjective:     HPI:   History of Present Illness  The patient is a 69-year-old male presenting for a follow-up on labs after his thyroid medications were adjusted. He is currently taking 200 mcg daily.    He reports no significant changes in his condition since the adjustment of his thyroid medication.    An MRI of his humerus was done to further evaluate a mass on his right shoulder that has been slowly enlarging. Previous ultrasound suspected probable lipoma. He describes the lump as having a dimple and feels like there are multiple masses present. The pain is not constant but is sharp when it occurs. He experiences general discomfort in his right shoulder, which can be so severe that he is unable to lift his arm. Chiropractic treatment has provided some relief. He does not believe the lump is serious and is considering having it removed.    He has undergone a Cologuard test, which returned a positive result. He has an appointment scheduled for 12/15/2024.    He has a comprehensive lab test scheduled in a few weeks due to a previous surgery. His cardiologist manages his other medications.      Assessment & Plan:     Problem List Items Addressed This Visit       Hyperlipidemia    Relevant Medications    pravastatin (PRAVACHOL) 20 MG Tab    propafenone (RYTHMOL) 150 MG Tab    PAF (paroxysmal atrial fibrillation) (HCC)    Relevant Medications    pravastatin (PRAVACHOL) 20 MG Tab    propafenone (RYTHMOL) 150 MG Tab    Acquired hypothyroidism     Other Visit Diagnoses       Mass of skin of right shoulder        Positive colorectal cancer screening using Cologuard test                  Assessment & Plan  1. Right shoulder mass.  Undiagnosed concern with uncertain prognosis.   The patient has a mass on his right shoulder, initially suspected to be a lipoma. An MRI was performed, but no findings resulted.  If the mass continues to cause discomfort, a consultation with a general surgeon for potential removal will be considered. The patient reports that the pain is sharp and intermittent, and it worsens with certain movements. Message sent to radiology to read result compared to prior US.     2. Hypothyroidism.  This is a chronic, stable medical condition.   The patient's thyroid function has normalized with a TSH level in the normal range and a stable free T4. He is currently on 200 mcg of thyroid medication daily. No adjustments to his thyroid medication are necessary at this time. Labs will be rechecked in approximately 6 months.    3. Positive Cologuard test.  The patient had a positive Cologuard test. He has scheduled a consultation for a colonoscopy on December 15. The consultation will discuss any risk factors and plan for the procedure.    4. Medication Management.  His pravastatin prescription will be renewed. He has recently refilled his thyroid medication.    Follow-up  Return in 6 months for follow-up.      Health Maintenance: Orders placed as applicable to patient     Objective:     Exam:  Objective:  Vitals:    11/04/24 1559   BP: 126/82   Pulse: 79   Resp: 17   Temp: 36.1 °C (96.9 °F)   SpO2: 97%     Physical Exam  Physical Exam  Musculoskeletal:        Arms:       Comments: Tenderness over the right lateral shoulder         Constitutional:       Appearance: Normal appearance.   Eyes:      Extraocular Movements: Extraocular movements intact.   Pulmonary:      Effort: Pulmonary effort is normal.   Neurological:      General: No focal deficit present.      Mental Status: She is alert and oriented to person, place, and time.   Psychiatric:         Mood and Affect: Mood normal.         Behavior: Behavior normal.       Return in about 6 months (around 5/4/2025).    Please note that this dictation was created using voice recognition software. I have made every reasonable attempt to correct obvious errors, but I  expect that there are errors of grammar and possibly content that I did not discover before finalizing the note.

## 2024-11-21 ENCOUNTER — HOSPITAL ENCOUNTER (OUTPATIENT)
Dept: LAB | Facility: MEDICAL CENTER | Age: 69
End: 2024-11-21
Attending: CLINICAL NURSE SPECIALIST
Payer: MEDICARE

## 2024-11-21 LAB
25(OH)D3 SERPL-MCNC: 56 NG/ML (ref 30–100)
ALBUMIN SERPL BCP-MCNC: 4.2 G/DL (ref 3.2–4.9)
ALBUMIN/GLOB SERPL: 1.6 G/DL
ALP SERPL-CCNC: 54 U/L (ref 30–99)
ALT SERPL-CCNC: 29 U/L (ref 2–50)
ANION GAP SERPL CALC-SCNC: 9 MMOL/L (ref 7–16)
AST SERPL-CCNC: 30 U/L (ref 12–45)
BASOPHILS # BLD AUTO: 1.9 % (ref 0–1.8)
BASOPHILS # BLD: 0.08 K/UL (ref 0–0.12)
BILIRUB SERPL-MCNC: 0.7 MG/DL (ref 0.1–1.5)
BUN SERPL-MCNC: 14 MG/DL (ref 8–22)
CALCIUM ALBUM COR SERPL-MCNC: 9 MG/DL (ref 8.5–10.5)
CALCIUM SERPL-MCNC: 9.2 MG/DL (ref 8.5–10.5)
CHLORIDE SERPL-SCNC: 108 MMOL/L (ref 96–112)
CHOLEST SERPL-MCNC: 148 MG/DL (ref 100–199)
CO2 SERPL-SCNC: 27 MMOL/L (ref 20–33)
CREAT SERPL-MCNC: 0.67 MG/DL (ref 0.5–1.4)
EOSINOPHIL # BLD AUTO: 0.14 K/UL (ref 0–0.51)
EOSINOPHIL NFR BLD: 3.3 % (ref 0–6.9)
ERYTHROCYTE [DISTWIDTH] IN BLOOD BY AUTOMATED COUNT: 41 FL (ref 35.9–50)
FASTING STATUS PATIENT QL REPORTED: NORMAL
FERRITIN SERPL-MCNC: 113 NG/ML (ref 22–322)
FOLATE SERPL-MCNC: 13.2 NG/ML
GFR SERPLBLD CREATININE-BSD FMLA CKD-EPI: 101 ML/MIN/1.73 M 2
GLOBULIN SER CALC-MCNC: 2.7 G/DL (ref 1.9–3.5)
GLUCOSE SERPL-MCNC: 98 MG/DL (ref 65–99)
HCT VFR BLD AUTO: 46.5 % (ref 42–52)
HDLC SERPL-MCNC: 42 MG/DL
HGB BLD-MCNC: 15.6 G/DL (ref 14–18)
IMM GRANULOCYTES # BLD AUTO: 0.03 K/UL (ref 0–0.11)
IMM GRANULOCYTES NFR BLD AUTO: 0.7 % (ref 0–0.9)
IRON SATN MFR SERPL: 37 % (ref 15–55)
IRON SERPL-MCNC: 110 UG/DL (ref 50–180)
LDLC SERPL CALC-MCNC: 80 MG/DL
LYMPHOCYTES # BLD AUTO: 1.27 K/UL (ref 1–4.8)
LYMPHOCYTES NFR BLD: 30.4 % (ref 22–41)
MCH RBC QN AUTO: 30.8 PG (ref 27–33)
MCHC RBC AUTO-ENTMCNC: 33.5 G/DL (ref 32.3–36.5)
MCV RBC AUTO: 91.7 FL (ref 81.4–97.8)
MONOCYTES # BLD AUTO: 0.28 K/UL (ref 0–0.85)
MONOCYTES NFR BLD AUTO: 6.7 % (ref 0–13.4)
NEUTROPHILS # BLD AUTO: 2.38 K/UL (ref 1.82–7.42)
NEUTROPHILS NFR BLD: 57 % (ref 44–72)
NRBC # BLD AUTO: 0 K/UL
NRBC BLD-RTO: 0 /100 WBC (ref 0–0.2)
PLATELET # BLD AUTO: 139 K/UL (ref 164–446)
PMV BLD AUTO: 12.8 FL (ref 9–12.9)
POTASSIUM SERPL-SCNC: 4.4 MMOL/L (ref 3.6–5.5)
PROT SERPL-MCNC: 6.9 G/DL (ref 6–8.2)
RBC # BLD AUTO: 5.07 M/UL (ref 4.7–6.1)
SODIUM SERPL-SCNC: 144 MMOL/L (ref 135–145)
TIBC SERPL-MCNC: 297 UG/DL (ref 250–450)
TRIGL SERPL-MCNC: 131 MG/DL (ref 0–149)
UIBC SERPL-MCNC: 187 UG/DL (ref 110–370)
VIT B12 SERPL-MCNC: 1493 PG/ML (ref 211–911)
WBC # BLD AUTO: 4.2 K/UL (ref 4.8–10.8)

## 2024-11-21 PROCEDURE — 83550 IRON BINDING TEST: CPT

## 2024-11-21 PROCEDURE — 85025 COMPLETE CBC W/AUTO DIFF WBC: CPT

## 2024-11-21 PROCEDURE — 36415 COLL VENOUS BLD VENIPUNCTURE: CPT

## 2024-11-21 PROCEDURE — 82607 VITAMIN B-12: CPT

## 2024-11-21 PROCEDURE — 82306 VITAMIN D 25 HYDROXY: CPT

## 2024-11-21 PROCEDURE — 84425 ASSAY OF VITAMIN B-1: CPT

## 2024-11-21 PROCEDURE — 82746 ASSAY OF FOLIC ACID SERUM: CPT

## 2024-11-21 PROCEDURE — 83540 ASSAY OF IRON: CPT

## 2024-11-21 PROCEDURE — 80053 COMPREHEN METABOLIC PANEL: CPT

## 2024-11-21 PROCEDURE — 80061 LIPID PANEL: CPT

## 2024-11-21 PROCEDURE — 82728 ASSAY OF FERRITIN: CPT

## 2024-11-24 LAB — VIT B1 BLD-MCNC: 247 NMOL/L (ref 70–180)

## 2024-12-06 DIAGNOSIS — I48.0 PAF (PAROXYSMAL ATRIAL FIBRILLATION) (HCC): ICD-10-CM

## 2024-12-06 NOTE — TELEPHONE ENCOUNTER
Received request via: Pharmacy    Was the patient seen in the last year in this department? Yes    Does the patient have an active prescription (recently filled or refills available) for medication(s) requested? No    [facility]    Does the patient have [facility] Plus and need 100-day supply? (This applies to ALL medications) Yes, quantity updated to 100 days

## 2024-12-09 RX ORDER — APIXABAN 5 MG/1
5 TABLET, FILM COATED ORAL 2 TIMES DAILY
Qty: 180 TABLET | Refills: 3 | Status: SHIPPED | OUTPATIENT
Start: 2024-12-09

## 2024-12-20 ENCOUNTER — TELEPHONE (OUTPATIENT)
Dept: CARDIOLOGY | Facility: MEDICAL CENTER | Age: 69
End: 2024-12-20
Payer: MEDICARE

## 2024-12-24 ENCOUNTER — TELEPHONE (OUTPATIENT)
Dept: CARDIOLOGY | Facility: MEDICAL CENTER | Age: 69
End: 2024-12-24
Payer: MEDICARE

## 2024-12-24 NOTE — TELEPHONE ENCOUNTER
Last OV: 8/16/2024  Proposed Surgery: Total Knee Arthroplasty  Surgery Date: 2/7/2025  Requesting Office Name: [facility] Al[facility]  Fax Number: [phone]  Preference of Location (default is surgery center unless specified by Cardiologist or [facility])  Prior Clearance Addressed: No    Is this a general clearance? YES   Anticoags/Antiplatelets: Apixaban   Anticoags/Antiplatelet managed by Cardiology? YES    Outstanding Cardiac Imaging : No  Ablation, Cardioversion, Stent, Cardiac Devices, Catheterization, Watchman: No  TAVR/Valve, Mitral Clip, Watchman (including open heart),: N/A   Recent Cardiac Hospitalization: No            When: N/A  History (cardiac history):   Past Medical History:   Diagnosis Date    Daytime sleepiness     Disorder of thyroid     High cholesterol     Hyperlipidemia     Hypertension     Obesity     Paroxysmal atrial fibrillation (HCC)     Postoperative pain 12/27/2022    Snoring     Unspecified cataract            Is this a dental clearance? NO  Ablation, Cardioversion, Watchman, Stents, Cath, Devices within the last 3 months? No   If yes- Send dental wait letter, do not forward to provider for review.     TAVR / Valve, Mitral clip within the last 6 months? No  If yes- Send dental wait letter, do not forward to provider for review.     If completing a general clearance, continue per protocol.           Surgical Clearance Letter Sent: YES   **Scan clearance request letter into [facility].**

## 2024-12-24 NOTE — LETTER
PROCEDURE/SURGERY CLEARANCE FORM      Encounter Date: 12/24/2024    Patient: Michael Villarreal  YOB: 1955    CARDIOLOGIST:  PATRICIA Ruiz    REFERRING DOCTOR:  No ref. provider found    The following procedure/surgery: Total Knee Arthroplasty                                           PROCEDURE/SURGERY CLEARANCE FORM    Date: 12/24/2024   Patient Name: Michael Villarreal    Dear Surgeon or Proceduralist,      Thank you for your request for cardiac stratification of our mutual patient Michael Villarreal 1955. We have reviewed their [facility] records; and to the best of our understanding this patient has not had stenting, ablation, watchman, cardiothoracic surgery or hospitalization for cardiovascular reasons in the past 6 months.  Michael Villarreal has been seen within the past 15 months and is considered to have non-modifiable cardiac risk for this low-risk procedure/surgery. They may proceed from a cardiovascular standpoint and may hold their antiplatelet/anticoagulation as briefly as possible. Please have patient resume this medication when hemodynamically stable to do so.     Aspirin or Prasugrel   - hold 7 days prior to procedure/surgery, resume when hemodynamically stable      Clopidrogrel or Ticagrelor  - hold 7 days for all neurological procedures, hold 5 days prior to all other procedure/surgery,  resume when hemodynamically stable     Warfarin - hold 7 days for all neurological procedures, hold 5 days prior to all other procedure/surgery and coordinate with [facility] Anticoagulation Clinic ([phone]) INR testing and dose management.      Pradaxa/Xarelto/Eliquis/Savesya - hold 1 day prior to procedure for low bleeding risk procedure, 2 days for high bleeding risk procedure, or consider holding 3 days or longer for patients with reduced kidney function (CrCl <30mL/min) or spinal/cranial surgeries/procedures.      If they have a mechanical heart valve, please coordinate with [facility]  Anticoagulation Service ([phone]) the proper management of their anticoagulant in the periprocedural or perioperative period.      Some patients have higher risk for cardiovascular complications or holding medication. If our patient has had prior complications of holding antiplatelet or anticoagulants in the past and we have seen them after these events, we have addressed these concerns with the patient. They are at an unknown degree of increased risk for recurrent complication.  You may hold anticoagulation/antiplatelets for the procedure or surgery if the benefits of the procedure or surgery outweigh this nonmodifiable risk.      If Michael Villarreal 1955 has new symptoms of heart failure decompensation, unstable arrythmia, or angina please reach out and we will assess the patient.      If you have other patient-specific concerns, please feel free to reach out to the patient's cardiologist directly at [phone].     Thank you,       [facility]       Electronically Signed   MD Signature   MARCUS Ruiz.

## 2025-01-06 PROCEDURE — RXMED WILLOW AMBULATORY MEDICATION CHARGE

## 2025-01-07 ENCOUNTER — PHARMACY VISIT (OUTPATIENT)
Dept: PHARMACY | Facility: MEDICAL CENTER | Age: 70
End: 2025-01-07
Payer: COMMERCIAL

## 2025-01-07 ENCOUNTER — TELEPHONE (OUTPATIENT)
Dept: CARDIOLOGY | Facility: MEDICAL CENTER | Age: 70
End: 2025-01-07
Payer: MEDICARE

## 2025-01-07 NOTE — LETTER
PROCEDURE/SURGERY CLEARANCE FORM      Encounter Date: 1/7/2025    Patient: Michael Villarreal  YOB: 1955    CARDIOLOGIST:  MARCUS Ruiz.    REFERRING DOCTOR:  No ref. provider found    The following procedure/surgery: Colonoscopy with Bx     PROCEDURE/SURGERY CLEARANCE FORM    Date: 1/10/2025   Patient Name: Michael Villarreal    Dear Surgeon or Proceduralist,      Thank you for your request for cardiac stratification of our mutual patient Michael Villarreal 1955. We have reviewed their [facility] records; and to the best of our understanding this patient has not had stenting, ablation, watchman, cardiothoracic surgery or hospitalization for cardiovascular reasons in the past 6 months.  Michael Villarreal has been seen within the past 15 months and is considered to have non-modifiable cardiac risk for this low-risk procedure/surgery. They may proceed from a cardiovascular standpoint and may hold their antiplatelet/anticoagulation as briefly as possible. Please have patient resume this medication when hemodynamically stable to do so.     Aspirin or Prasugrel   - hold 7 days prior to procedure/surgery, resume when hemodynamically stable      Clopidrogrel or Ticagrelor  - hold 7 days for all neurological procedures, hold 5 days prior to all other procedure/surgery,  resume when hemodynamically stable     Warfarin - hold 7 days for all neurological procedures, hold 5 days prior to all other procedure/surgery and coordinate with [facility] Anticoagulation Clinic ([phone]) INR testing and dose management.      Pradaxa/Xarelto/Eliquis/Savesya - hold 1 day prior to procedure for low bleeding risk procedure, 2 days for high bleeding risk procedure, or consider holding 3 days or longer for patients with reduced kidney function (CrCl <30mL/min) or spinal/cranial surgeries/procedures.      If they have a mechanical heart valve, please coordinate with [facility] Anticoagulation Service ([phone]) the  proper management of their anticoagulant in the periprocedural or perioperative period.      Some patients have higher risk for cardiovascular complications or holding medication. If our patient has had prior complications of holding antiplatelet or anticoagulants in the past and we have seen them after these events, we have addressed these concerns with the patient. They are at an unknown degree of increased risk for recurrent complication.  You may hold anticoagulation/antiplatelets for the procedure or surgery if the benefits of the procedure or surgery outweigh this nonmodifiable risk.      If Michael Villarreal 1955 has new symptoms of heart failure decompensation, unstable arrythmia, or angina please reach out and we will assess the patient.      If you have other patient-specific concerns, please feel free to reach out to the patient's cardiologist directly at [phone].     Thank you,       [facility]

## 2025-01-07 NOTE — TELEPHONE ENCOUNTER
MEDICAL CLEARANCE     Caller: DEQUAN Dinero    Name and Department:     [facility]  P: [phone]    Topic/Issue: MEDICAL ADVICE    Per Rosette;    Michael will need to have a procedure done on Thursday 1/9 and he will need to stop a certain medication prior to this procedure. Rosette is faxing the clearance request now and will need a response by EOD. Please advise.    Thank you,  Tomás GERMAN    Callback Number or Extension: [phone]

## 2025-01-08 DIAGNOSIS — I48.0 PAF (PAROXYSMAL ATRIAL FIBRILLATION) (HCC): ICD-10-CM

## 2025-01-08 DIAGNOSIS — E78.2 MIXED HYPERLIPIDEMIA: ICD-10-CM

## 2025-01-09 ENCOUNTER — TELEPHONE (OUTPATIENT)
Dept: CARDIOLOGY | Facility: MEDICAL CENTER | Age: 70
End: 2025-01-09
Payer: MEDICARE

## 2025-01-09 PROCEDURE — RXMED WILLOW AMBULATORY MEDICATION CHARGE: Performed by: INTERNAL MEDICINE

## 2025-01-09 NOTE — TELEPHONE ENCOUNTER
Received request via: Pharmacy    Was the patient seen in the last year in this department? Yes    Does the patient have an active prescription (recently filled or refills available) for medication(s) requested? No    Pharmacy Name: [facility]    Does the patient have halfway Plus and need 100-day supply? (This applies to ALL medications) Yes, quantity updated to 100 days

## 2025-01-10 NOTE — TELEPHONE ENCOUNTER
Last OV: 08/16/2024  Proposed Surgery: Colonoscopy with Bx   Surgery Date: 02/03/2025  Requesting Office Name: [facility]  Fax Number: [phone]  Preference of Location (default is surgery center unless specified by Cardiologist or [facility])  Prior Clearance Addressed: No    Is this a general clearance? YES   Anticoags/Antiplatelets: Apixaban   Anticoags/Antiplatelet managed by Cardiology? YES    Outstanding Cardiac Imaging : No  Ablation, Cardioversion, Stent, Cardiac Devices, Catheterization, Watchman: No  TAVR/Valve, Mitral Clip, Watchman (including open heart),: N/A   Recent Cardiac Hospitalization: No            When: N/A  History (cardiac history):   Past Medical History:   Diagnosis Date    Daytime sleepiness     Disorder of thyroid     High cholesterol     Hyperlipidemia     Hypertension     Obesity     Paroxysmal atrial fibrillation (HCC)     Postoperative pain 12/27/2022    Snoring     Unspecified cataract            Is this a dental clearance? NO  Ablation, Cardioversion, Watchman, Stents, Cath, Devices within the last 3 months? No   If yes- Send dental wait letter, do not forward to provider for review.     TAVR / Valve, Mitral clip within the last 6 months? No  If yes- Send dental wait letter, do not forward to provider for review.     If completing a general clearance, continue per protocol.           Surgical Clearance Letter Sent: YES   **Scan clearance request letter into [employer].**

## 2025-01-13 ENCOUNTER — APPOINTMENT (OUTPATIENT)
Dept: ADMISSIONS | Facility: MEDICAL CENTER | Age: 70
End: 2025-01-13
Attending: ORTHOPAEDIC SURGERY
Payer: MEDICARE

## 2025-01-13 RX ORDER — PRAVASTATIN SODIUM 20 MG
20 TABLET ORAL NIGHTLY
Qty: 100 TABLET | Refills: 3 | Status: SHIPPED | OUTPATIENT
Start: 2025-01-13

## 2025-01-13 RX ORDER — PROPAFENONE HYDROCHLORIDE 150 MG/1
150 TABLET, COATED ORAL 2 TIMES DAILY
Qty: 200 TABLET | Refills: 6 | Status: SHIPPED | OUTPATIENT
Start: 2025-01-13

## 2025-01-17 ENCOUNTER — PRE-ADMISSION TESTING (OUTPATIENT)
Dept: ADMISSIONS | Facility: MEDICAL CENTER | Age: 70
End: 2025-01-17
Attending: ORTHOPAEDIC SURGERY
Payer: MEDICARE

## 2025-01-17 VITALS — BODY MASS INDEX: 31.35 KG/M2 | HEIGHT: 70 IN | WEIGHT: 219 LBS

## 2025-01-17 DIAGNOSIS — Z01.812 PRE-OPERATIVE LABORATORY EXAMINATION: ICD-10-CM

## 2025-01-17 LAB
ANION GAP SERPL CALC-SCNC: 11 MMOL/L (ref 7–16)
BUN SERPL-MCNC: 18 MG/DL (ref 8–22)
CALCIUM SERPL-MCNC: 9.3 MG/DL (ref 8.4–10.2)
CHLORIDE SERPL-SCNC: 105 MMOL/L (ref 96–112)
CO2 SERPL-SCNC: 27 MMOL/L (ref 20–33)
CREAT SERPL-MCNC: 0.64 MG/DL (ref 0.5–1.4)
ERYTHROCYTE [DISTWIDTH] IN BLOOD BY AUTOMATED COUNT: 43.8 FL (ref 35.9–50)
GFR SERPLBLD CREATININE-BSD FMLA CKD-EPI: 102 ML/MIN/1.73 M 2
GLUCOSE SERPL-MCNC: 86 MG/DL (ref 65–99)
HCT VFR BLD AUTO: 51 % (ref 42–52)
HGB BLD-MCNC: 16.9 G/DL (ref 14–18)
MCH RBC QN AUTO: 31.8 PG (ref 27–33)
MCHC RBC AUTO-ENTMCNC: 33.1 G/DL (ref 32.3–36.5)
MCV RBC AUTO: 95.9 FL (ref 81.4–97.8)
PLATELET # BLD AUTO: 167 K/UL (ref 164–446)
PMV BLD AUTO: 13.1 FL (ref 9–12.9)
POTASSIUM SERPL-SCNC: 4 MMOL/L (ref 3.6–5.5)
RBC # BLD AUTO: 5.32 M/UL (ref 4.7–6.1)
SCCMEC + MECA PNL NOSE NAA+PROBE: NEGATIVE
SCCMEC + MECA PNL NOSE NAA+PROBE: NEGATIVE
SODIUM SERPL-SCNC: 143 MMOL/L (ref 135–145)
WBC # BLD AUTO: 8.3 K/UL (ref 4.8–10.8)

## 2025-01-17 PROCEDURE — 87640 STAPH A DNA AMP PROBE: CPT | Mod: XU

## 2025-01-17 PROCEDURE — 85027 COMPLETE CBC AUTOMATED: CPT

## 2025-01-17 PROCEDURE — 36415 COLL VENOUS BLD VENIPUNCTURE: CPT

## 2025-01-17 PROCEDURE — 87641 MR-STAPH DNA AMP PROBE: CPT

## 2025-01-17 PROCEDURE — 80048 BASIC METABOLIC PNL TOTAL CA: CPT

## 2025-01-17 ASSESSMENT — FIBROSIS 4 INDEX: FIB4 SCORE: 2.77

## 2025-01-17 NOTE — DISCHARGE PLANNING
DISCHARGE PLANNING NOTE - TOTAL JOINT    Procedure: Procedure(s):  LEFT TOTAL KNEE ARTHROPLASTY  Procedure Date: 2/7/2025  Insurance: Payor: [facility] C[employer] PLUS / Plan: [facility] PREFERRED  / Product Type: Medicare Advantage /    Equipment currently available at home?  cane, front-wheel walker, shower chair, and Grab bars, Hand held shower head  Steps into the home? 2  Steps within the home? 0  Toilet height? ADA  Type of shower? walk-in shower  Home Oxygen? No  Portable tank?    Oxygen Provider:  Planning same day discharge: Yes    Is Outpatient Physical Therapy set up after surgery? Unsure, has Preop appt on the 23rd.  Did you take the Total Joint Class and where? Yes, received NAON book.  Who will be your transportation home on day of discharge? Spouse    Have you made arrangements to have someone stay with you at home for the first 3 days following discharge, and if so, whom? Spouse    Have you notified your surgeon that you do not have transportation or someone to help you after discharge? N/A    Are you planning on going to a transitional care facility, for example a skilled nursing facility, post operatively for rehab, and if so, have you contacted your insurance plan to see if they cover this? N/A      Met with the pt. Pt given a copy of Home Safety Checklist, Equipment Resource Guide, CHG scrub kit and instructions. Expected process in Recovery Room and dc criteria discussed with pt. All questions answered and verbalizes understanding of all instructions. No dc needs identified at this time. Anticipate dc to home without barriers. MRSA swab obtained.

## 2025-01-17 NOTE — PREADMIT AVS NOTE
Current Medications   Medication Instructions    multivitamin Tab Stop 7 days before surgery         pravastatin (PRAVACHOL) 20 MG Tab Continue until night before surgery    propafenone (RYTHMOL) 150 MG Tab Take morning of surgery with sip of water         apixaban (ELIQUIS) 5mg Tab Follow instructions from surgeon or specialist.    levothyroxine (SYNTHROID) 200 MCG Tab Take morning of surgery with sip of water         B Complex Vitamins (VITAMIN-B COMPLEX PO) Stop 7 days before surgery    VITAMIN D PO Stop 7 days before surgery    CALCIUM PO Stop 7 days before surgery    omeprazole (PRILOSEC) 20 MG delayed-release capsule Take morning of surgery with sip of water

## 2025-01-22 ENCOUNTER — PHARMACY VISIT (OUTPATIENT)
Dept: PHARMACY | Facility: MEDICAL CENTER | Age: 70
End: 2025-01-22
Payer: COMMERCIAL

## 2025-01-23 ENCOUNTER — HOME HEALTH ADMISSION (OUTPATIENT)
Dept: HOME HEALTH SERVICES | Facility: HOME HEALTHCARE | Age: 70
End: 2025-01-23
Payer: MEDICARE

## 2025-01-23 PROCEDURE — RXMED WILLOW AMBULATORY MEDICATION CHARGE: Performed by: ORTHOPAEDIC SURGERY

## 2025-01-30 ENCOUNTER — PHARMACY VISIT (OUTPATIENT)
Dept: PHARMACY | Facility: MEDICAL CENTER | Age: 70
End: 2025-01-30
Payer: COMMERCIAL

## 2025-02-07 ENCOUNTER — ANESTHESIA EVENT (OUTPATIENT)
Dept: SURGERY | Facility: MEDICAL CENTER | Age: 70
End: 2025-02-07
Payer: MEDICARE

## 2025-02-07 ENCOUNTER — APPOINTMENT (OUTPATIENT)
Dept: RADIOLOGY | Facility: MEDICAL CENTER | Age: 70
End: 2025-02-07
Payer: MEDICARE

## 2025-02-07 ENCOUNTER — HOSPITAL ENCOUNTER (OUTPATIENT)
Facility: MEDICAL CENTER | Age: 70
End: 2025-02-07
Attending: ORTHOPAEDIC SURGERY | Admitting: ORTHOPAEDIC SURGERY
Payer: MEDICARE

## 2025-02-07 ENCOUNTER — ANESTHESIA (OUTPATIENT)
Dept: SURGERY | Facility: MEDICAL CENTER | Age: 70
End: 2025-02-07
Payer: MEDICARE

## 2025-02-07 VITALS
OXYGEN SATURATION: 94 % | WEIGHT: 222.55 LBS | SYSTOLIC BLOOD PRESSURE: 139 MMHG | DIASTOLIC BLOOD PRESSURE: 71 MMHG | BODY MASS INDEX: 31.86 KG/M2 | HEIGHT: 70 IN | TEMPERATURE: 96.8 F | HEART RATE: 88 BPM | RESPIRATION RATE: 18 BRPM

## 2025-02-07 DIAGNOSIS — Z96.652 HISTORY OF TOTAL KNEE ARTHROPLASTY, LEFT: ICD-10-CM

## 2025-02-07 PROCEDURE — 160035 HCHG PACU - 1ST 60 MINS PHASE I: Performed by: ORTHOPAEDIC SURGERY

## 2025-02-07 PROCEDURE — 160029 HCHG SURGERY MINUTES - 1ST 30 MINS LEVEL 4: Performed by: ORTHOPAEDIC SURGERY

## 2025-02-07 PROCEDURE — 700111 HCHG RX REV CODE 636 W/ 250 OVERRIDE (IP): Performed by: ANESTHESIOLOGY

## 2025-02-07 PROCEDURE — 700101 HCHG RX REV CODE 250: Performed by: ORTHOPAEDIC SURGERY

## 2025-02-07 PROCEDURE — 160041 HCHG SURGERY MINUTES - EA ADDL 1 MIN LEVEL 4: Performed by: ORTHOPAEDIC SURGERY

## 2025-02-07 PROCEDURE — 160046 HCHG PACU - 1ST 60 MINS PHASE II: Performed by: ORTHOPAEDIC SURGERY

## 2025-02-07 PROCEDURE — 97535 SELF CARE MNGMENT TRAINING: CPT

## 2025-02-07 PROCEDURE — 64447 NJX AA&/STRD FEMORAL NRV IMG: CPT | Performed by: ORTHOPAEDIC SURGERY

## 2025-02-07 PROCEDURE — A9270 NON-COVERED ITEM OR SERVICE: HCPCS | Performed by: ANESTHESIOLOGY

## 2025-02-07 PROCEDURE — 700101 HCHG RX REV CODE 250: Performed by: ANESTHESIOLOGY

## 2025-02-07 PROCEDURE — 700111 HCHG RX REV CODE 636 W/ 250 OVERRIDE (IP): Performed by: ORTHOPAEDIC SURGERY

## 2025-02-07 PROCEDURE — C1776 JOINT DEVICE (IMPLANTABLE): HCPCS | Performed by: ORTHOPAEDIC SURGERY

## 2025-02-07 PROCEDURE — 700105 HCHG RX REV CODE 258: Performed by: ANESTHESIOLOGY

## 2025-02-07 PROCEDURE — 502000 HCHG MISC OR IMPLANTS RC 0278: Performed by: ORTHOPAEDIC SURGERY

## 2025-02-07 PROCEDURE — 700102 HCHG RX REV CODE 250 W/ 637 OVERRIDE(OP): Performed by: ANESTHESIOLOGY

## 2025-02-07 PROCEDURE — C1713 ANCHOR/SCREW BN/BN,TIS/BN: HCPCS | Performed by: ORTHOPAEDIC SURGERY

## 2025-02-07 PROCEDURE — 160009 HCHG ANES TIME/MIN: Performed by: ORTHOPAEDIC SURGERY

## 2025-02-07 PROCEDURE — 160002 HCHG RECOVERY MINUTES (STAT): Performed by: ORTHOPAEDIC SURGERY

## 2025-02-07 PROCEDURE — 160025 RECOVERY II MINUTES (STATS): Performed by: ORTHOPAEDIC SURGERY

## 2025-02-07 PROCEDURE — 502240 HCHG MISC OR SUPPLY RC 0272: Performed by: ORTHOPAEDIC SURGERY

## 2025-02-07 PROCEDURE — 73560 X-RAY EXAM OF KNEE 1 OR 2: CPT | Mod: LT

## 2025-02-07 PROCEDURE — 160047 HCHG PACU  - EA ADDL 30 MINS PHASE II: Performed by: ORTHOPAEDIC SURGERY

## 2025-02-07 PROCEDURE — 97162 PT EVAL MOD COMPLEX 30 MIN: CPT

## 2025-02-07 PROCEDURE — 160048 HCHG OR STATISTICAL LEVEL 1-5: Performed by: ORTHOPAEDIC SURGERY

## 2025-02-07 DEVICE — IMPLANTABLE DEVICE: Type: IMPLANTABLE DEVICE | Site: KNEE | Status: FUNCTIONAL

## 2025-02-07 DEVICE — CEMENT ORTHOPEDIC HV US (10/PK): Type: IMPLANTABLE DEVICE | Site: KNEE | Status: FUNCTIONAL

## 2025-02-07 RX ORDER — MEPERIDINE HYDROCHLORIDE 25 MG/ML
12.5 INJECTION INTRAMUSCULAR; INTRAVENOUS; SUBCUTANEOUS
Status: DISCONTINUED | OUTPATIENT
Start: 2025-02-07 | End: 2025-02-07 | Stop reason: HOSPADM

## 2025-02-07 RX ORDER — TRANEXAMIC ACID 100 MG/ML
INJECTION, SOLUTION INTRAVENOUS PRN
Status: DISCONTINUED | OUTPATIENT
Start: 2025-02-07 | End: 2025-02-07 | Stop reason: SURG

## 2025-02-07 RX ORDER — SODIUM CHLORIDE, SODIUM LACTATE, POTASSIUM CHLORIDE, CALCIUM CHLORIDE 600; 310; 30; 20 MG/100ML; MG/100ML; MG/100ML; MG/100ML
INJECTION, SOLUTION INTRAVENOUS CONTINUOUS
Status: ACTIVE | OUTPATIENT
Start: 2025-02-07 | End: 2025-02-07

## 2025-02-07 RX ORDER — HYDROMORPHONE HYDROCHLORIDE 1 MG/ML
0.4 INJECTION, SOLUTION INTRAMUSCULAR; INTRAVENOUS; SUBCUTANEOUS
Status: DISCONTINUED | OUTPATIENT
Start: 2025-02-07 | End: 2025-02-07 | Stop reason: HOSPADM

## 2025-02-07 RX ORDER — SODIUM CHLORIDE, SODIUM LACTATE, POTASSIUM CHLORIDE, CALCIUM CHLORIDE 600; 310; 30; 20 MG/100ML; MG/100ML; MG/100ML; MG/100ML
INJECTION, SOLUTION INTRAVENOUS
Status: DISCONTINUED | OUTPATIENT
Start: 2025-02-07 | End: 2025-02-07 | Stop reason: SURG

## 2025-02-07 RX ORDER — OXYCODONE HYDROCHLORIDE 5 MG/1
2.5 TABLET ORAL
Status: CANCELLED | OUTPATIENT
Start: 2025-02-07

## 2025-02-07 RX ORDER — ACETAMINOPHEN 500 MG
1000 TABLET ORAL ONCE
Status: DISCONTINUED | OUTPATIENT
Start: 2025-02-07 | End: 2025-02-07 | Stop reason: HOSPADM

## 2025-02-07 RX ORDER — CELECOXIB 200 MG/1
CAPSULE ORAL
Status: DISCONTINUED
Start: 2025-02-07 | End: 2025-02-07 | Stop reason: HOSPADM

## 2025-02-07 RX ORDER — LIDOCAINE HYDROCHLORIDE 20 MG/ML
INJECTION, SOLUTION EPIDURAL; INFILTRATION; INTRACAUDAL; PERINEURAL PRN
Status: DISCONTINUED | OUTPATIENT
Start: 2025-02-07 | End: 2025-02-07 | Stop reason: SURG

## 2025-02-07 RX ORDER — SCOPOLAMINE 1 MG/3D
1 PATCH, EXTENDED RELEASE TRANSDERMAL
Status: CANCELLED | OUTPATIENT
Start: 2025-02-07

## 2025-02-07 RX ORDER — ACETAMINOPHEN 500 MG
1000 TABLET ORAL EVERY 6 HOURS
Status: CANCELLED | OUTPATIENT
Start: 2025-02-07 | End: 2025-02-12

## 2025-02-07 RX ORDER — KETOROLAC TROMETHAMINE 30 MG/ML
INJECTION, SOLUTION INTRAMUSCULAR; INTRAVENOUS
Status: DISCONTINUED | OUTPATIENT
Start: 2025-02-07 | End: 2025-02-07 | Stop reason: HOSPADM

## 2025-02-07 RX ORDER — ONDANSETRON 2 MG/ML
4 INJECTION INTRAMUSCULAR; INTRAVENOUS
Status: DISCONTINUED | OUTPATIENT
Start: 2025-02-07 | End: 2025-02-07 | Stop reason: HOSPADM

## 2025-02-07 RX ORDER — POLYETHYLENE GLYCOL 3350 17 G/17G
1 POWDER, FOR SOLUTION ORAL 2 TIMES DAILY PRN
Status: CANCELLED | OUTPATIENT
Start: 2025-02-07

## 2025-02-07 RX ORDER — GABAPENTIN 300 MG/1
300 CAPSULE ORAL 3 TIMES DAILY
Status: CANCELLED | OUTPATIENT
Start: 2025-02-07

## 2025-02-07 RX ORDER — AMOXICILLIN 250 MG
1 CAPSULE ORAL NIGHTLY
Status: CANCELLED | OUTPATIENT
Start: 2025-02-07

## 2025-02-07 RX ORDER — OXYCODONE HYDROCHLORIDE 5 MG/1
5 TABLET ORAL
Status: CANCELLED | OUTPATIENT
Start: 2025-02-07

## 2025-02-07 RX ORDER — ACETAMINOPHEN 500 MG
TABLET ORAL
Status: DISCONTINUED
Start: 2025-02-07 | End: 2025-02-07 | Stop reason: HOSPADM

## 2025-02-07 RX ORDER — OXYCODONE HCL 5 MG/5 ML
5 SOLUTION, ORAL ORAL
Status: COMPLETED | OUTPATIENT
Start: 2025-02-07 | End: 2025-02-07

## 2025-02-07 RX ORDER — SODIUM CHLORIDE 9 MG/ML
INJECTION, SOLUTION INTRAMUSCULAR; INTRAVENOUS; SUBCUTANEOUS
Status: DISCONTINUED | OUTPATIENT
Start: 2025-02-07 | End: 2025-02-07 | Stop reason: HOSPADM

## 2025-02-07 RX ORDER — TRAMADOL HYDROCHLORIDE 50 MG/1
50 TABLET ORAL EVERY 4 HOURS PRN
Status: CANCELLED | OUTPATIENT
Start: 2025-02-07

## 2025-02-07 RX ORDER — DEXAMETHASONE SODIUM PHOSPHATE 4 MG/ML
4 INJECTION, SOLUTION INTRA-ARTICULAR; INTRALESIONAL; INTRAMUSCULAR; INTRAVENOUS; SOFT TISSUE
Status: CANCELLED | OUTPATIENT
Start: 2025-02-07

## 2025-02-07 RX ORDER — HYDROMORPHONE HYDROCHLORIDE 1 MG/ML
0.25 INJECTION, SOLUTION INTRAMUSCULAR; INTRAVENOUS; SUBCUTANEOUS
Status: CANCELLED | OUTPATIENT
Start: 2025-02-07

## 2025-02-07 RX ORDER — AMOXICILLIN 250 MG
1 CAPSULE ORAL
Status: CANCELLED | OUTPATIENT
Start: 2025-02-07

## 2025-02-07 RX ORDER — CEFAZOLIN SODIUM 1 G/3ML
INJECTION, POWDER, FOR SOLUTION INTRAMUSCULAR; INTRAVENOUS PRN
Status: DISCONTINUED | OUTPATIENT
Start: 2025-02-07 | End: 2025-02-07 | Stop reason: SURG

## 2025-02-07 RX ORDER — ACETAMINOPHEN 500 MG
1000 TABLET ORAL EVERY 6 HOURS PRN
Status: CANCELLED | OUTPATIENT
Start: 2025-02-12

## 2025-02-07 RX ORDER — DEXAMETHASONE SODIUM PHOSPHATE 4 MG/ML
INJECTION, SOLUTION INTRA-ARTICULAR; INTRALESIONAL; INTRAMUSCULAR; INTRAVENOUS; SOFT TISSUE PRN
Status: DISCONTINUED | OUTPATIENT
Start: 2025-02-07 | End: 2025-02-07 | Stop reason: SURG

## 2025-02-07 RX ORDER — VANCOMYCIN HYDROCHLORIDE 1 G/20ML
INJECTION, POWDER, LYOPHILIZED, FOR SOLUTION INTRAVENOUS
Status: COMPLETED | OUTPATIENT
Start: 2025-02-07 | End: 2025-02-07

## 2025-02-07 RX ORDER — ROPIVACAINE HYDROCHLORIDE 5 MG/ML
INJECTION, SOLUTION EPIDURAL; INFILTRATION; PERINEURAL
Status: DISCONTINUED | OUTPATIENT
Start: 2025-02-07 | End: 2025-02-07 | Stop reason: HOSPADM

## 2025-02-07 RX ORDER — CEFAZOLIN SODIUM 1 G/3ML
2 INJECTION, POWDER, FOR SOLUTION INTRAMUSCULAR; INTRAVENOUS ONCE
Status: DISCONTINUED | OUTPATIENT
Start: 2025-02-07 | End: 2025-02-07 | Stop reason: HOSPADM

## 2025-02-07 RX ORDER — DIPHENHYDRAMINE HYDROCHLORIDE 50 MG/ML
25 INJECTION INTRAMUSCULAR; INTRAVENOUS EVERY 6 HOURS PRN
Status: CANCELLED | OUTPATIENT
Start: 2025-02-07

## 2025-02-07 RX ORDER — ONDANSETRON 2 MG/ML
4 INJECTION INTRAMUSCULAR; INTRAVENOUS EVERY 4 HOURS PRN
Status: CANCELLED | OUTPATIENT
Start: 2025-02-07

## 2025-02-07 RX ORDER — BISACODYL 10 MG
10 SUPPOSITORY, RECTAL RECTAL
Status: CANCELLED | OUTPATIENT
Start: 2025-02-07

## 2025-02-07 RX ORDER — DOCUSATE SODIUM 100 MG/1
100 CAPSULE, LIQUID FILLED ORAL 2 TIMES DAILY
Status: CANCELLED | OUTPATIENT
Start: 2025-02-07

## 2025-02-07 RX ORDER — SODIUM PHOSPHATE,MONO-DIBASIC 19G-7G/118
1 ENEMA (ML) RECTAL
Status: CANCELLED | OUTPATIENT
Start: 2025-02-07

## 2025-02-07 RX ORDER — EPINEPHRINE 1 MG/ML(1)
AMPUL (ML) INJECTION
Status: DISCONTINUED | OUTPATIENT
Start: 2025-02-07 | End: 2025-02-07 | Stop reason: HOSPADM

## 2025-02-07 RX ORDER — HALOPERIDOL 5 MG/ML
1 INJECTION INTRAMUSCULAR EVERY 6 HOURS PRN
Status: CANCELLED | OUTPATIENT
Start: 2025-02-07

## 2025-02-07 RX ORDER — CELECOXIB 200 MG/1
200 CAPSULE ORAL
Status: DISCONTINUED | OUTPATIENT
Start: 2025-02-07 | End: 2025-02-07 | Stop reason: HOSPADM

## 2025-02-07 RX ORDER — HYDROMORPHONE HYDROCHLORIDE 1 MG/ML
0.2 INJECTION, SOLUTION INTRAMUSCULAR; INTRAVENOUS; SUBCUTANEOUS
Status: DISCONTINUED | OUTPATIENT
Start: 2025-02-07 | End: 2025-02-07 | Stop reason: HOSPADM

## 2025-02-07 RX ORDER — OXYCODONE HCL 5 MG/5 ML
10 SOLUTION, ORAL ORAL
Status: COMPLETED | OUTPATIENT
Start: 2025-02-07 | End: 2025-02-07

## 2025-02-07 RX ORDER — HYDROMORPHONE HYDROCHLORIDE 1 MG/ML
0.1 INJECTION, SOLUTION INTRAMUSCULAR; INTRAVENOUS; SUBCUTANEOUS
Status: DISCONTINUED | OUTPATIENT
Start: 2025-02-07 | End: 2025-02-07 | Stop reason: HOSPADM

## 2025-02-07 RX ORDER — HALOPERIDOL 5 MG/ML
1 INJECTION INTRAMUSCULAR
Status: DISCONTINUED | OUTPATIENT
Start: 2025-02-07 | End: 2025-02-07 | Stop reason: HOSPADM

## 2025-02-07 RX ORDER — DIPHENHYDRAMINE HYDROCHLORIDE 50 MG/ML
12.5 INJECTION INTRAMUSCULAR; INTRAVENOUS
Status: DISCONTINUED | OUTPATIENT
Start: 2025-02-07 | End: 2025-02-07 | Stop reason: HOSPADM

## 2025-02-07 RX ORDER — SODIUM CHLORIDE, SODIUM LACTATE, POTASSIUM CHLORIDE, CALCIUM CHLORIDE 600; 310; 30; 20 MG/100ML; MG/100ML; MG/100ML; MG/100ML
INJECTION, SOLUTION INTRAVENOUS CONTINUOUS
Status: DISCONTINUED | OUTPATIENT
Start: 2025-02-07 | End: 2025-02-07 | Stop reason: HOSPADM

## 2025-02-07 RX ADMIN — TRANEXAMIC ACID 1000 MG: 100 INJECTION, SOLUTION INTRAVENOUS at 10:32

## 2025-02-07 RX ADMIN — CEFAZOLIN 2 G: 1 INJECTION, POWDER, FOR SOLUTION INTRAMUSCULAR; INTRAVENOUS at 10:27

## 2025-02-07 RX ADMIN — OXYCODONE HYDROCHLORIDE 10 MG: 5 SOLUTION ORAL at 12:00

## 2025-02-07 RX ADMIN — SODIUM CHLORIDE, POTASSIUM CHLORIDE, SODIUM LACTATE AND CALCIUM CHLORIDE: 600; 310; 30; 20 INJECTION, SOLUTION INTRAVENOUS at 10:29

## 2025-02-07 RX ADMIN — DEXAMETHASONE SODIUM PHOSPHATE 8 MG: 4 INJECTION INTRA-ARTICULAR; INTRALESIONAL; INTRAMUSCULAR; INTRAVENOUS; SOFT TISSUE at 10:32

## 2025-02-07 RX ADMIN — PROPOFOL 200 MG: 10 INJECTION, EMULSION INTRAVENOUS at 10:32

## 2025-02-07 RX ADMIN — LIDOCAINE HYDROCHLORIDE 100 MG: 20 INJECTION, SOLUTION EPIDURAL; INFILTRATION; INTRACAUDAL; PERINEURAL at 10:32

## 2025-02-07 ASSESSMENT — PAIN DESCRIPTION - PAIN TYPE
TYPE: SURGICAL PAIN
TYPE: CHRONIC PAIN
TYPE: SURGICAL PAIN
TYPE: SURGICAL PAIN

## 2025-02-07 ASSESSMENT — COGNITIVE AND FUNCTIONAL STATUS - GENERAL
MOBILITY SCORE: 22
SUGGESTED CMS G CODE MODIFIER MOBILITY: CJ
WALKING IN HOSPITAL ROOM: A LITTLE
CLIMB 3 TO 5 STEPS WITH RAILING: A LITTLE

## 2025-02-07 ASSESSMENT — FIBROSIS 4 INDEX: FIB4 SCORE: 2.3

## 2025-02-07 ASSESSMENT — GAIT ASSESSMENTS
DISTANCE (FEET): 150
DEVIATION: ANTALGIC
GAIT LEVEL OF ASSIST: SUPERVISED
ASSISTIVE DEVICE: FRONT WHEEL WALKER

## 2025-02-07 NOTE — ANESTHESIA PROCEDURE NOTES
Airway    Date/Time: 2/7/2025 10:33 AM    Performed by: Tobey Gansert, M.D.  Authorized by: Tobey Gansert, M.D.    Location:  OR  Urgency:  Elective  Indications for Airway Management:  Anesthesia      Spontaneous Ventilation: absent    Sedation Level:  Deep  Preoxygenated: Yes    Final Airway Type:  Supraglottic airway  Final Supraglottic Airway:  Standard LMA    SGA Size:  4  Number of Attempts at Approach:  1

## 2025-02-07 NOTE — ANESTHESIA POSTPROCEDURE EVALUATION
Patient: Michael Villarreal    Procedure Summary       Date: 02/07/25 Room / Location:  OR 06 / SURGERY [facility]    Anesthesia Start: 1029 Anesthesia Stop: 1147    Procedure: LEFT TOTAL KNEE ARTHROPLASTY (Left: Knee) Diagnosis: (OSTEOARTHRITIS OF LEFT KNEE JOINT)    Surgeons: Denton Weaver M.D. Responsible Provider: Tobey Gansert, M.D.    Anesthesia Type: general, peripheral nerve block ASA Status: 2            Final Anesthesia Type: general, peripheral nerve block  Last vitals  BP   Blood Pressure : 139/71    Temp   36 °C (96.8 °F)    Pulse   88   Resp   18    SpO2   94 %      Anesthesia Post Evaluation    Patient location during evaluation: PACU  Patient participation: complete - patient participated  Level of consciousness: awake and alert    Airway patency: patent  Anesthetic complications: no  Cardiovascular status: hemodynamically stable  Respiratory status: acceptable  Hydration status: euvolemic    PONV: none          No notable events documented.     Nurse Pain Score: 4 (NPRS)

## 2025-02-07 NOTE — DISCHARGE INSTRUCTIONS
"    Peripheral Nerve Block Discharge Instructions from Same Day Surgery and Inpatient :    What to Expect - Lower Extremity  The block may cause you to experience numbness and weakness in your thigh and knee or calf and foot on the same side as your surgery  Numbness, tingling and / or weakness are all normal. For some people, this may be an unpleasant sensation  These issues will be resolved when the local anesthetic wears off   You may experience numbness and tingling in your thigh on the same side as your surgery if the block medicine was injected at your groin area  Numbness will make it difficult to walk  You may have problems with balance and walking so be very careful   Follow your surgeon's direction regarding weight bearing on your surgical limb  Be very careful with your numb limb  Precautions  The numbness may affect your balance  Be careful when walking or moving around  Your leg may be weak: be very careful putting weight on it  If your surgeon did not specify a time, you should not bear weight for 24 hours  Be sure to ask for help when you need it  It is better to have help than to fall and hurt yourself  Prevent Injury  Protect the limb like a baby  Beware of exposing your limb to extreme heat or cold or trauma  The limb may be injured without you noticing because it is numb  Keep the limb elevated whenever possible  Do not sleep on the limb  Change the position of the limb regularly  Avoid putting pressure on your surgical limb  Pain Control  The initial block on the day of surgery will make your extremity feel \"numb\"  Any consecutive injection including prior to discharge from the hospital will make your extremity feel \"numb\"  You may feel an aching or burning when the local anesthesia starts to wear off  Take pain pills as prescribed by your surgeon  Call your surgeon or anesthesiologist if you do not have adequate pain control    If any questions arise, call your provider.  If your provider is not " available, please feel free to call the Surgical Center at [phone].    MEDICATIONS: Resume taking daily medication.  Take prescribed pain medication with food.  If no medication is prescribed, you may take non-aspirin pain medication if needed.  PAIN MEDICATION CAN BE VERY CONSTIPATING.  Take a stool softener or laxative such as senokot, pericolace, or milk of magnesia if needed.    Last pain medication given at 1200, 10 MG Oxycodone    What to Expect Post Anesthesia    Rest and take it easy for the first 24 hours.  A responsible adult is recommended to remain with you during that time.  It is normal to feel sleepy.  We encourage you to not do anything that requires balance, judgment or coordination.    FOR 24 HOURS DO NOT:  Drive, operate machinery or run household appliances.  Drink beer or alcoholic beverages.  Make important decisions or sign legal documents.    To avoid nausea, slowly advance diet as tolerated, avoiding spicy or greasy foods for the first day.  Add more substantial food to your diet according to your provider's instructions.  Babies can be fed formula or breast milk as soon as they are hungry.  INCREASE FLUIDS AND FIBER TO AVOID CONSTIPATION.    MILD FLU-LIKE SYMPTOMS ARE NORMAL.  YOU MAY EXPERIENCE GENERALIZED MUSCLE ACHES, THROAT IRRITATION, HEADACHE AND/OR SOME NAUSEA.

## 2025-02-07 NOTE — ANESTHESIA TIME REPORT
Anesthesia Start and Stop Event Times       Date Time Event    2/7/2025 1020 Ready for Procedure     1029 Anesthesia Start     1147 Anesthesia Stop          Responsible Staff  02/07/25      Name Role Begin End    Tobey Gansert, M.D. Anesth 1029 1147          Overtime Reason:  no overtime (within assigned shift)    Comments:

## 2025-02-07 NOTE — ANESTHESIA PROCEDURE NOTES
Peripheral Block    Date/Time: 2/7/2025 10:24 AM    Performed by: Tobey Gansert, M.D.  Authorized by: Tobey Gansert, M.D.    Start Time:  2/7/2025 10:24 AM  End Time:  2/7/2025 10:28 AM  Reason for Block: at surgeon's request and post-op pain management ONLY    patient identified, IV checked, site marked, risks and benefits discussed, surgical consent, monitors and equipment checked, pre-op evaluation and timeout performed    Patient Position:  Supine  Prep: ChloraPrep    Monitoring:  Heart rate, continuous pulse ox and cardiac monitor  Block Region:  Lower Extremity  Lower Extremity - Block Type:  Selective FEMORAL nerve block at the Adductor Canal    Laterality:  Left  Procedures: ultrasound guided  Image captured, interpreted and electronically stored.  Local Infiltration:  Lidocaine  Strength:  1 %  Dose:  3 ml  Block Type:  Single-shot  Needle Length:  100mm  Needle Gauge:  21 G  Needle Localization:  Ultrasound guidance  Ultrasound picture in chart  Injection Assessment:  Negative aspiration for heme, no paresthesia on injection, incremental injection and local visualized surrounding nerve on ultrasound  Evidence of intravascular injection: No     US Guided Selective Femoral Nerve Block at Adductor Canal:   US probe placed at mid-thigh level on externally rotated leg and femur identified.  Probe directed medially until Sartorius Muscle (SM), Femoral Artery (FA) and Saphenous Nerve (SN) identified in Adductor Canal (AC).  Needle inserted anterolateral to probe in an in plane approach into a subsartorial perivascular perineural position.  After negative aspiration LA injected with ease and visualized spreading within the AC.

## 2025-02-07 NOTE — ANESTHESIA PREPROCEDURE EVALUATION
Case: [id number] Anesthesia Start Date/Time: 02/07/25 1029    Procedure: LEFT TOTAL KNEE ARTHROPLASTY (Left: Knee)    Anesthesia type: General    Pre-op diagnosis: OSTEOARTHRITIS OF LEFT KNEE JOINT    Location:  OR 06 / SURGERY [facility]    Surgeons: Denton Weaver M.D.            Relevant Problems   ANESTHESIA   (positive) FORREST (obstructive sleep apnea)      CARDIAC   (positive) Ectatic thoracic aorta (HCC)   (positive) PAF (paroxysmal atrial fibrillation) (HCC)      ENDO   (positive) Acquired hypothyroidism       Physical Exam    Airway   Mallampati: II  TM distance: >3 FB  Neck ROM: full       Cardiovascular - normal exam  Rhythm: regular  Rate: normal  (-) murmur     Dental - normal exam           Pulmonary - normal exam  Breath sounds clear to auscultation     Abdominal    Neurological - normal exam                   Anesthesia Plan    ASA 2       Plan - general and peripheral nerve block     Peripheral nerve block will be post-op pain control  Airway plan will be LMA          Induction: intravenous    Postoperative Plan: Postoperative administration of opioids is intended.    Pertinent diagnostic labs and testing reviewed    Informed Consent:    Anesthetic plan and risks discussed with patient.    Use of blood products discussed with: patient whom consented to blood products.

## 2025-02-07 NOTE — OP REPORT
Referring Provider: Marvin Mensah*      PCP:  PATRICIA Parker     Name:Antoine Villarreal     MRN: [id number]     DATE OF SURGERY: 25     SURGEON: Denton Weaver MD    PREOPERATIVE DIAGNOSIS:  OSTEOARTHRITIS OF LEFT KNEE JOINT      POSTOPERATIVE DIAGNOSIS:  OSTEOARTHRITIS OF LEFT KNEE JOINT     PROCEDURE(s) PERFORMED:  1.  Procedure(s):  Left - LEFT TOTAL KNEE ARTHROPLASTY - Wound Class: Clean - Incision Closure: Deep and Superficial Layers       Assistants:  Circulator: Abena Deluca RHomeNHome; Jo Black R.N.  Scrub Person: Camilo Rodríguez; Lacy Barth     1st assist:Yessy Mendez PA-C     Assistants were mandatory to provide adequate exposure for accurate implantation of prosthetic components, while protecting vital neurovascular structures     ANESTHESIOLOGIST:  Anesthesiologist: Tobey Gansert, M.D.     ANESTHESIA: General     ESTIMATED BLOOD LOSS: 50 cc     TOURNIQUET TIME:  * Missing tourniquet times found for documented tourniquets in lo *     ANTIBIOTICS: 2 gm of Ancef before incision     COMPLICATIONS: * No complications entered in OR log *     IMPLANTS:    Depuy  Size 7 PS femur, cementless Attune  Size 6 RP tibia, cementless  Size 7x5 mm polyethylene insert  38 mm patella     INDICATION FOR PROCEDURE: Michael Villarreal is a 69 y.o.? male  who presented for the above procedure. The patient has failed conservative intervention and this has limited the patients ability to perform activities of daily living without restrictions. Risks and benefits of surgery were reviewed which included but were not limited to PE, DVT, infection, hardware failure or loosening, reoperation, the need for transfusion and death. Alternatives to surgery had previously been reviewed with the patient as well.  The patient wished to proceed with the above procedure.     PROCEDURE IN DETAIL: The patient was met in the preoperative holding area. Informed consent was previously obtained in  clinic and we reviewed this again. Appropriate site was marked. The patient was taken to the operating room where the above anesthesia was performed without complication.  Abundant soft roll and a tourniquet were placed high on the thigh of the operative extremity.  The operative lower extremity was then prepped and draped in normal sterile fashion. A formal time-out was performed, identifying correct patient, correct site, and correct procedure.  The patient antibiotics received as above before incision.      At that point, an incision was made down through subcutaneous tissue to fascia. The knee was exposed with a medial parapatellar arthrotomy.   Arthrosis was consistent with our preoperative radiographs.  Appropriate medial soft tissue release was performed for exposure purposes.  I then measured the maximal thickness of the patella.  A free hand cut of the patella was performed and I sized the patella.  I then finished the patella. We turned our attention to the knee and freed the anterior medial and lateral meniscus and transected the ACL and PCL. I then drilled the distal femur and placed the intra-medullary guide, set at 5 degrees of valgus and 9 mm. I made my distal femur cut taking care to protect MCL and PCL.  I exposed the tibia. My Ortholign computer was used and pinned in place and registered, set to take 10 mm from the unaffected side at 0 degrees varus/valgus and 3 degrees posterior slope. The proximal tibia cut was made. The femur and tibia were sized, and components were selected.  I put my spacer block in at that time in full extension.  Alignment was assessed and felt to be appropriate after appropriate releases were performed.  A spacer block was selected which had about 1-2 mm of opening on the medial and lateral side and appeared to be well balanced with full extension noted.  Laminar spreaders were placed and I placed tension these with the knee placed in around 90 degrees of flexion. I  placed my 4-in-1 cutting block with my spacer in place to equalize my flexion and extension gaps.  I assessed my rotation with my AP and epicondylar axis as a double check for appropriate component positioning.  The anterior and posterior condylar followed by my anterior and posterior chamfer cuts were then made.  Posterior osteophytes were then removed.  I then placed my box cut centered on the femur.  My trial femoral component was then placed without complication.  Attention was then turned to the tibia.  I placed my tibial tray in an appropriate amount of external rotation and final preparation of the tibia was made with the central drill and broach.  This was left in place.  I then placed my polyethylene trial without complication.  My patellar trial was placed at that time as well.  With this combination in place, the knee was found to have full extension and full flexion with the drop test.  Stability was excellent and symmetric in flexion and extension.  A lateral release was not necessary to achieve central patellar tracking.  The tourniquet was then elevated.  Trial components were then removed. The bony surfaces were then irrigated with pulsed lavage and suctioned while the cement was mixed and placed in the cement gun.  Periarticular injection was then performed with epinephrine, Marcaine and toradol.  Final components were cemented in place using pressurization techniques and the knee was placed in full extension while the cement cured.  Excess cement was meticulously removed to avoid third body debris.  Tourniquet was deflated and hemostasis was obtained.  Copius irrigation was performed.  Stability was once again assessed without change and the patella was noted to track centrally.  The arthrotomy was closed with #1 Vicryl, subcutaneous tissue closed with 2-0 Vicryl, and I used a 3-0 monofilament for the subcuticular layer for the skin.  A sterile prenio dressing was placed.  All counts were correct  at the end of the case. The patient was awoken from anesthesia and seemed to tolerate procedure well.           DISPOSTION: Stable to the PACU.  The patient will be weight bearing as tolerated.  The patient will have PT/OT while in-house.    DVT prophylaxis will begin POD #1.  Follow-up in my clinic has been set up for a wound check already.

## 2025-02-07 NOTE — THERAPY
Physical Therapy   Initial Evaluation     Patient Name: Michael Villarreal  Age:  69 y.o., Sex:  male  Medical Record #: [id number]  Today's Date: 2/7/2025     Precautions  Precautions: (P) Weight Bearing As Tolerated Left Lower Extremity    Assessment  Patient is 69 y.o. male who was seen s/p left TKA with WBAT orders for left lower extremity. Pt was agreeable to therapy evaluation and presented with safe upright functional mobility with AD use after therapy session. Pt was provided with education on elevation, icing, positioning, and supine/seated therapeutic exercises. Pt was able to return demo safe gait mechanics with use of AD on flat level surfaces with appropriate heel to toe gait mechanics. Pt was able to return demo safe mechanics in order to navigate stairs with use of FWW. Pt was able to demonstrate safe use of AD during transfers/transitions and general locomotion with no fatimah LOB. Pt has no acute skilled PT needs at this time, anticipate pt to d/c home once medically clear with recs for FWW use and OP therapy services.     The pt was provided with the following skilled therapy txt: Pt was provided with VC, demo, and facilitation during gait training in order to return demo terminal knee extension and appropriate heel strike/flexion of knee in order to return demo appropriate heel to toe gait mechanics. Pt was provided with demo and VC to go up/down steps with safe mechanics with use of AD. Pt was provided with VC and TC for appropriate quad contraction and mechanics during supine/seated therapeutic exercises. Pt was provided with education on intensity, frequency, and duration of exercises.    Plan    Physical Therapy Initial Treatment Plan   Duration: (P) Evaluation only    DC Equipment Recommendations: (P) Front-Wheel Walker  Discharge Recommendations: (P) Recommend outpatient physical therapy services to address higher level deficits     Objective       02/07/25 1340   Initial Contact Note    Initial  Contact Note Order Received and Verified, Evaluation Only - Patient Does Not Require Further Acute Physical Therapy at this Time.  However, May Benefit from Post Acute Therapy for Higher Level Functional Deficits.   Precautions   Precautions Weight Bearing As Tolerated Left Lower Extremity   Pain 0 - 10 Group   Location Knee   Location Orientation Left   Description Sore;Aching   Therapist Pain Assessment Prior to Activity;During Activity;Post Activity;Nurse Notified;4   Prior Living Situation   Prior Services None   Housing / Facility 1 Story House   Steps Into Home 1   Steps In Home 0   Equipment Owned Front-Wheel Walker   Lives with - Patient's Self Care Capacity Spouse   Prior Level of Functional Mobility   Bed Mobility Independent   Transfer Status Independent   Ambulation Independent   Ambulation Distance   (community)   Assistive Devices Used None   Stairs Independent   History of Falls   History of Falls No   Cognition    Cognition / Consciousness WDL   Passive ROM Upper Body   Passive ROM Upper Body WDL   Active ROM Upper Body   Active ROM Upper Body  WDL   Strength Upper Body   Upper Body Strength  WDL   Sensation Upper Body   Upper Extremity Sensation  WDL   Upper Body Muscle Tone   Upper Body Muscle Tone  WDL   Passive ROM Lower Body   Passive ROM Lower Body X   Comments limited due to pain, able to demo 0-90 deg of L knee ROM   Active ROM Lower Body    Active ROM Lower Body  X   Comments same as above   Strength Lower Body   Lower Body Strength  X   Comments limited due to pain, able to demo functional strength for B LE   Sensation Lower Body   Lower Extremity Sensation   WDL   Lower Body Muscle Tone   Lower Body Muscle Tone  WDL   Neurological Concerns   Neurological Concerns No   Coordination Upper Body   Coordination WDL   Coordination Lower Body    Coordination Lower Body  WDL   Balance Assessment   Sitting Balance (Static) Good   Sitting Balance (Dynamic) Fair +   Standing Balance (Static) Good    Standing Balance (Dynamic) Fair +   Weight Shift Sitting Good   Weight Shift Standing Fair   Comments w/fww use, no fatimah LOB noted   Bed Mobility    Comments found up in chair   Gait Analysis   Gait Level Of Assist Supervised   Assistive Device Front Wheel Walker   Distance (Feet) 150   # of Times Distance was Traveled 1   Deviation Antalgic   # of Stairs Climbed 2   Level of Assist with Stairs Supervised   Functional Mobility   Sit to Stand Supervised   Bed, Chair, Wheelchair Transfer Supervised   Transfer Method Stand Step   Mobility sit<>stand, ambulation, stairs, transfer back to chair   6 Clicks Assessment - How much HELP from from another person do you currently need... (If the patient hasn't done an activity recently, how much help from another person do you think he/she would need if he/she tried?)   Turning from your back to your side while in a flat bed without using bedrails? 4   Moving from lying on your back to sitting on the side of a flat bed without using bedrails? 4   Moving to and from a bed to a chair (including a wheelchair)? 4   Standing up from a chair using your arms (e.g., wheelchair, or bedside chair)? 4   Walking in hospital room? 3   Climbing 3-5 steps with a railing? 3   6 clicks Mobility Score 22   Activity Tolerance   Sitting in Chair functional   Sitting Edge of Bed NT   Standing 10 mins   Comments no adverse events noted   Edema / Skin Assessment   Edema / Skin  Not Assessed   Education Group   Education Provided Role of Physical Therapist;Exercises - Supine;Exercises - Seated;Gait Training;Stair Training;Use of Assistive Device   Role of Physical Therapist Patient Response Patient;Acceptance;Explanation;Demonstration;Verbal Demonstration;Action Demonstration   Gait Training Patient Response Patient;Acceptance;Explanation;Demonstration;Verbal Demonstration;Action Demonstration   Stair Training Patient Response Patient;Acceptance;Explanation;Demonstration;Verbal Demonstration;Action  Demonstration   Use of Assistive Device Patient Response Patient;Acceptance;Explanation;Demonstration;Verbal Demonstration;Action Demonstration   Exercises - Supine Patient Response Patient;Acceptance;Explanation;Demonstration;Handout;Action Demonstration;Verbal Demonstration   Exercises - Seated Patient Response Patient;Acceptance;Explanation;Demonstration;Handout;Verbal Demonstration;Action Demonstration   Physical Therapy Initial Treatment Plan    Duration Evaluation only   Anticipated Discharge Equipment and Recommendations   DC Equipment Recommendations Front-Wheel Walker   Discharge Recommendations Recommend outpatient physical therapy services to address higher level deficits   Interdisciplinary Plan of Care Collaboration   IDT Collaboration with  Nursing   Patient Position at End of Therapy Seated;Call Light within Reach;Phone within Reach;Tray Table within Reach;Family / Friend in Room   Collaboration Comments aware of visit and recs   Session Information   Date / Session Number  2/7 eval only

## 2025-02-07 NOTE — OR NURSING
0830 Patient arrived in Pre-op.     0905 Confirmed name, , and surgery to be performed. Verified allergies, medications, and preferred pharmacy. Patient is A&OX4.     0947 IV started and patency confirmed.    0947 Pt is ready for surgery. Awaiting physicians.

## 2025-02-07 NOTE — OR NURSING
1347 Discharge instructions reviewed with and signed by Mae. All questions answered and verbalized understanding.

## 2025-02-11 ENCOUNTER — HOME CARE VISIT (OUTPATIENT)
Dept: HOME HEALTH SERVICES | Facility: HOME HEALTHCARE | Age: 70
End: 2025-02-11
Payer: MEDICARE

## 2025-02-11 PROCEDURE — 665001 SOC-HOME HEALTH

## 2025-02-11 PROCEDURE — 665005 NO-PAY RAP - HOME HEALTH

## 2025-02-11 PROCEDURE — G0299 HHS/HOSPICE OF RN EA 15 MIN: HCPCS

## 2025-02-11 PROCEDURE — 665998 HH PPS REVENUE CREDIT

## 2025-02-11 PROCEDURE — 665999 HH PPS REVENUE DEBIT

## 2025-02-11 ASSESSMENT — FIBROSIS 4 INDEX: FIB4 SCORE: 2.3

## 2025-02-12 ENCOUNTER — HOME CARE VISIT (OUTPATIENT)
Dept: HOME HEALTH SERVICES | Facility: HOME HEALTHCARE | Age: 70
End: 2025-02-12
Payer: MEDICARE

## 2025-02-12 VITALS
BODY MASS INDEX: 30.85 KG/M2 | WEIGHT: 215 LBS | SYSTOLIC BLOOD PRESSURE: 120 MMHG | OXYGEN SATURATION: 97 % | HEART RATE: 90 BPM | RESPIRATION RATE: 16 BRPM | TEMPERATURE: 98.2 F | DIASTOLIC BLOOD PRESSURE: 70 MMHG

## 2025-02-12 VITALS
DIASTOLIC BLOOD PRESSURE: 68 MMHG | OXYGEN SATURATION: 98 % | TEMPERATURE: 97.7 F | RESPIRATION RATE: 16 BRPM | SYSTOLIC BLOOD PRESSURE: 124 MMHG | HEART RATE: 94 BPM

## 2025-02-12 PROCEDURE — G0151 HHCP-SERV OF PT,EA 15 MIN: HCPCS

## 2025-02-12 PROCEDURE — 665999 HH PPS REVENUE DEBIT

## 2025-02-12 PROCEDURE — 665998 HH PPS REVENUE CREDIT

## 2025-02-12 ASSESSMENT — ENCOUNTER SYMPTOMS
BOWEL PATTERN NORMAL: 1
PAIN LOCATION - EXACERBATING FACTORS: SITTING TOO LONG
PAIN LOCATION - PAIN SEVERITY: 5/10
PAIN LOCATION - PAIN QUALITY: DULL, ACHY
PAIN LOCATION - PAIN FREQUENCY: INTERMITTENT
LOWER EXTREMITY EDEMA: 1
LOWEST PAIN SEVERITY IN PAST 24 HOURS: 0/10
PAIN SEVERITY GOAL: 0/10
SUBJECTIVE PAIN PROGRESSION: GRADUALLY IMPROVING
PAIN LOCATION: LEFT KNEE/THIGH
DEBILITATING PAIN: 1
STOOL FREQUENCY: DAILY
HIGHEST PAIN SEVERITY IN PAST 24 HOURS: 5/10
LAST BOWEL MOVEMENT: 67247
PAIN: 1

## 2025-02-12 ASSESSMENT — ACTIVITIES OF DAILY LIVING (ADL)
AMBULATION ASSISTANCE: INDEPENDENT
OASIS_M1830: 03
AMBULATION ASSISTANCE: 1

## 2025-02-13 PROCEDURE — 665999 HH PPS REVENUE DEBIT

## 2025-02-13 PROCEDURE — 665998 HH PPS REVENUE CREDIT

## 2025-02-13 ASSESSMENT — ACTIVITIES OF DAILY LIVING (ADL)
AMBULATION ASSISTANCE ON FLAT SURFACES: 1
AMBULATION_DISTANCE/DURATION_TOLERATED: 100'
CURRENT_FUNCTION: INDEPENDENT
AMBULATION ASSISTANCE: 1
PHYSICAL TRANSFERS ASSESSED: 1
AMBULATION ASSISTANCE: SUPERVISION

## 2025-02-13 ASSESSMENT — ENCOUNTER SYMPTOMS
PAIN LOCATION - PAIN FREQUENCY: INTERMITTENT
SUBJECTIVE PAIN PROGRESSION: UNCHANGED
MUSCLE WEAKNESS: 1
PAIN SEVERITY GOAL: 0/10
ARTHRALGIAS: 1
PAIN LOCATION - PAIN QUALITY: SORE, TIGHT
PAIN LOCATION - RELIEVING FACTORS: REST, MEDS
HIGHEST PAIN SEVERITY IN PAST 24 HOURS: 2/10
LOWEST PAIN SEVERITY IN PAST 24 HOURS: 0/10
PAIN LOCATION: LEFT KNEE
PAIN LOCATION - PAIN SEVERITY: 2/10
PAIN LOCATION - EXACERBATING FACTORS: EDEMA
LIMITED RANGE OF MOTION: 1
PAIN: 1

## 2025-02-13 NOTE — CASE COMMUNICATION
soc 2/11/25 [facility] [id number]  Left total knee on 2/07/25 by Dr Denton Weaver  Pt to eval  ! more nursing visit after removal of dressing

## 2025-02-14 ENCOUNTER — HOME CARE VISIT (OUTPATIENT)
Dept: HOME HEALTH SERVICES | Facility: HOME HEALTHCARE | Age: 70
End: 2025-02-14
Payer: MEDICARE

## 2025-02-14 ENCOUNTER — DOCUMENTATION (OUTPATIENT)
Dept: VASCULAR LAB | Facility: MEDICAL CENTER | Age: 70
End: 2025-02-14
Payer: MEDICARE

## 2025-02-14 VITALS
RESPIRATION RATE: 16 BRPM | TEMPERATURE: 98.3 F | OXYGEN SATURATION: 95 % | DIASTOLIC BLOOD PRESSURE: 76 MMHG | HEART RATE: 88 BPM | SYSTOLIC BLOOD PRESSURE: 118 MMHG

## 2025-02-14 PROCEDURE — 665998 HH PPS REVENUE CREDIT

## 2025-02-14 PROCEDURE — G0151 HHCP-SERV OF PT,EA 15 MIN: HCPCS

## 2025-02-14 PROCEDURE — 665999 HH PPS REVENUE DEBIT

## 2025-02-14 ASSESSMENT — ENCOUNTER SYMPTOMS
PAIN LOCATION - PAIN SEVERITY: 1/10
PAIN LOCATION - PAIN FREQUENCY: FREQUENT
SUBJECTIVE PAIN PROGRESSION: UNCHANGED
PAIN LOCATION: LEFT KNEE
LOWEST PAIN SEVERITY IN PAST 24 HOURS: 1/10
HIGHEST PAIN SEVERITY IN PAST 24 HOURS: 2/10
PAIN: 1
PAIN SEVERITY GOAL: 0/10

## 2025-02-14 NOTE — PROGRESS NOTES
[facility]    Received referral from [facility] to review patient's medication list.    Med list reviewed and reconciled.  Allergies reviewed.    No clinically significant DDI identified.      Samia Balderas, AnnaD

## 2025-02-15 PROCEDURE — 665998 HH PPS REVENUE CREDIT

## 2025-02-15 PROCEDURE — 665999 HH PPS REVENUE DEBIT

## 2025-02-16 PROCEDURE — 665999 HH PPS REVENUE DEBIT

## 2025-02-16 PROCEDURE — 665998 HH PPS REVENUE CREDIT

## 2025-02-17 PROCEDURE — 665999 HH PPS REVENUE DEBIT

## 2025-02-17 PROCEDURE — 665998 HH PPS REVENUE CREDIT

## 2025-02-18 ENCOUNTER — HOME CARE VISIT (OUTPATIENT)
Dept: HOME HEALTH SERVICES | Facility: HOME HEALTHCARE | Age: 70
End: 2025-02-18
Payer: MEDICARE

## 2025-02-18 PROCEDURE — 665998 HH PPS REVENUE CREDIT

## 2025-02-18 PROCEDURE — G0157 HHC PT ASSISTANT EA 15: HCPCS | Mod: CQ

## 2025-02-18 PROCEDURE — 665999 HH PPS REVENUE DEBIT

## 2025-02-19 VITALS
TEMPERATURE: 98.3 F | RESPIRATION RATE: 16 BRPM | HEART RATE: 90 BPM | SYSTOLIC BLOOD PRESSURE: 100 MMHG | OXYGEN SATURATION: 98 % | DIASTOLIC BLOOD PRESSURE: 70 MMHG

## 2025-02-19 PROCEDURE — 665998 HH PPS REVENUE CREDIT

## 2025-02-19 PROCEDURE — 665999 HH PPS REVENUE DEBIT

## 2025-02-19 ASSESSMENT — ENCOUNTER SYMPTOMS: DENIES PAIN: 1

## 2025-02-20 ENCOUNTER — HOME CARE VISIT (OUTPATIENT)
Dept: HOME HEALTH SERVICES | Facility: HOME HEALTHCARE | Age: 70
End: 2025-02-20
Payer: MEDICARE

## 2025-02-20 PROCEDURE — 665999 HH PPS REVENUE DEBIT

## 2025-02-20 PROCEDURE — 665998 HH PPS REVENUE CREDIT

## 2025-02-21 PROCEDURE — 665999 HH PPS REVENUE DEBIT

## 2025-02-21 PROCEDURE — 665998 HH PPS REVENUE CREDIT

## 2025-02-22 PROCEDURE — 665999 HH PPS REVENUE DEBIT

## 2025-02-22 PROCEDURE — 665998 HH PPS REVENUE CREDIT

## 2025-02-23 PROCEDURE — 665998 HH PPS REVENUE CREDIT

## 2025-02-23 PROCEDURE — 665999 HH PPS REVENUE DEBIT

## 2025-02-24 ENCOUNTER — HOME CARE VISIT (OUTPATIENT)
Dept: HOME HEALTH SERVICES | Facility: HOME HEALTHCARE | Age: 70
End: 2025-02-24
Payer: MEDICARE

## 2025-02-24 PROCEDURE — 665998 HH PPS REVENUE CREDIT

## 2025-02-24 PROCEDURE — 665999 HH PPS REVENUE DEBIT

## 2025-02-25 ENCOUNTER — HOME CARE VISIT (OUTPATIENT)
Dept: HOME HEALTH SERVICES | Facility: HOME HEALTHCARE | Age: 70
End: 2025-02-25
Payer: MEDICARE

## 2025-02-25 PROCEDURE — G0299 HHS/HOSPICE OF RN EA 15 MIN: HCPCS

## 2025-02-25 PROCEDURE — 665999 HH PPS REVENUE DEBIT

## 2025-02-25 PROCEDURE — 665998 HH PPS REVENUE CREDIT

## 2025-02-26 VITALS
DIASTOLIC BLOOD PRESSURE: 76 MMHG | RESPIRATION RATE: 16 BRPM | SYSTOLIC BLOOD PRESSURE: 128 MMHG | TEMPERATURE: 98.2 F | OXYGEN SATURATION: 97 % | HEART RATE: 90 BPM

## 2025-02-26 PROCEDURE — 665999 HH PPS REVENUE DEBIT

## 2025-02-26 PROCEDURE — 665998 HH PPS REVENUE CREDIT

## 2025-02-26 ASSESSMENT — ENCOUNTER SYMPTOMS
APPETITE LEVEL: GOOD
PAIN: 1
PAIN LOCATION: LEFT KNEE
STOOL FREQUENCY: DAILY
PAIN LOCATION - PAIN SEVERITY: 0/10
SUBJECTIVE PAIN PROGRESSION: WAXING AND WANING
PAIN SEVERITY GOAL: 0/10
HIGHEST PAIN SEVERITY IN PAST 24 HOURS: 2/10
CHANGE IN APPETITE: UNCHANGED
VOMITING: DENIES
LAST BOWEL MOVEMENT: 67261
BOWEL PATTERN NORMAL: 1
DENIES PAIN: 1
LOWEST PAIN SEVERITY IN PAST 24 HOURS: 0/10
NAUSEA: DENIES

## 2025-02-26 ASSESSMENT — ACTIVITIES OF DAILY LIVING (ADL)
HOME_HEALTH_OASIS: 01
OASIS_M1830: 02

## 2025-04-07 PROCEDURE — RXMED WILLOW AMBULATORY MEDICATION CHARGE

## 2025-04-08 NOTE — OP REPORT
Operative Report    Date: 12/27/2022    Surgeon: Aubrey Barrera M.D.     Assistant: KIKE Pierre    Pre-operative Diagnosis: Morbid Obesity    Post-operative Diagnosis: Same    Procedure: Laparoscopic Sleeve Gastrectomy     ASA Classification: III.    Indications: This is a 67 y.o. male who presented with symptoms of Morbid Obesity, here for bariatric surgery.    The indications for a surgical assistant in this surgery were indicated due to complexity of the procedure. Their role included aiding in incision, retraction, holding devices including camera for laparoscopic procedure, and closure of the wound.      Findings: negative leak test    BMI: body mass index is 38.19 kg/m².    Past Medical History:   Past Medical History:   Diagnosis Date    Daytime sleepiness     Disorder of thyroid     High cholesterol     Hyperlipidemia     Hypertension     Obesity     Paroxysmal atrial fibrillation (HCC)     Snoring     Unspecified cataract        Wound Classification: Class II, II, Clean Contaminated..    Procedure in detail: The patient was seen and examined in the preoperative holding area.  The risks benefits and alternatives of the procedure were discussed with the patient who wished to proceed with the procedure as described.  The patient was transferred to the operating room placed in supine position and all pressure points were properly padded.  General endotracheal anesthesia was induced and preoperative antibiotics were given per SCIP protocol.  Patient's abdomen was prepped with ChloraPrep and draped in the normal sterile fashion.  A timeout was performed confirming correct patient, correct procedure, and that all necessary equipment was in the room.      We began the procedure by accessing the abdomen.  The 5 mm Optiview port was used to access the abdomen in the epigastric area off midline to the left. Once we entered the abdomen pneumoperitoneum was achieved and maintained at 15 mmHg carbon dioxide  throughout the entirety of the case.  Under direct visualization a 5mm and a 12 mm right upper quadrant working port were placed and a 15 mm left upper quadrant working port were placed.  We then placed the Yumiko self-retaining liver retraction device allowing us good view of the stomach and the hiatus.    We began the procedure by mobilizing the epigastric fat pad using the ultrasonic energy device.  This was carefully dissected free demonstrating the angle of Hiss as well as the left tia. No hiatal hernia was identified.    We then proceeded with our laparoscopic sleeve gastrectomy.  The stomach was grasped and elevated and the ultrasonic energy device was used to enter the greater omentum.  The short gastric vessels were carefully dissected free along the entirety of the greater curve from approximately 5 cm from the pylorus through to the previous hiatal dissection.  We then elevated the stomach and identified and lysed any adhesions underlying the stomach in the lesser sac.  Under direct visualization the 40 Yi Visigi tube was advanced into the distal stomach and suction was applied.  We carefully splayed the stomach out along the Visigi tube.  We then proceeded with the gastrectomy using several fires of the purple load Endo LUCAS stapler.  The resected stomach was placed off of the side and the suction removed from the Visigi tube. We then remove the Visigi tube and clipped in the areas of persistent bleeding with the 5 mm auto clip applier as required and pexied the greater omentum to the staple line to allow for appropriate orientation of the sleeve using several 2-0 Ethibond sutures.  The resected stomach was then removed through the left upper quadrant 15 mm port and sent for pathology.    The Yumiko liver retractor was removed under direct visualization.  The remainder of our ports were removed and the pneumoperitoneum was released.  Skin was closed using subcuticular 4-0 Monocryl.  Dermabond  was placed over the wounds.    The patient was awakened from general anesthetic, and was taken to the recovery room in stable condition.    Sponge and needle counts were correct at the end of the case.     Specimen: partial gastrectomy    EBL: 15mL    Dispo: stable, extubated, to PACU    Aubrey Barrera M.D.  [facility]  [phone]         Render Risk Assessment In Note?: no Additional Notes: Benign lesions treated with liquid nitrogen. Per Dr. Wagner no charge for treatment.  Counseled pt on possibility of scarring and pigment changes Detail Level: Simple

## 2025-04-09 ENCOUNTER — HOSPITAL ENCOUNTER (OUTPATIENT)
Dept: RADIOLOGY | Facility: MEDICAL CENTER | Age: 70
End: 2025-04-09
Attending: UROLOGY
Payer: MEDICARE

## 2025-04-09 ENCOUNTER — PHARMACY VISIT (OUTPATIENT)
Dept: PHARMACY | Facility: MEDICAL CENTER | Age: 70
End: 2025-04-09
Payer: COMMERCIAL

## 2025-04-09 DIAGNOSIS — N20.0 RENAL STONES: ICD-10-CM

## 2025-04-09 PROCEDURE — 74018 RADEX ABDOMEN 1 VIEW: CPT

## 2025-04-15 ENCOUNTER — OFFICE VISIT (OUTPATIENT)
Dept: UROLOGY | Facility: MEDICAL CENTER | Age: 70
End: 2025-04-15
Payer: MEDICARE

## 2025-04-15 DIAGNOSIS — N20.0 NEPHROLITHIASIS: ICD-10-CM

## 2025-04-15 PROCEDURE — 99212 OFFICE O/P EST SF 10 MIN: CPT | Performed by: UROLOGY

## 2025-04-15 NOTE — PROGRESS NOTES
Chief Complaint: history of kidney stones    HPI: Michael Villarreal is a 69 y.o. male with a history of bilateral kidney stones, here today for follow up after recent surveillance KUB. The left renal stone we've been monitoring appears stable, with a diameter of 7.3 mm (was previously reported as 7 mm on CT).     He feels very well, and has had no flank pain or hematuria. No new urinary symptoms. No nocturia or daytime frequency, and reports a normal urinary flow.     He's been staying very well hydrated.       Past Medical History:  Past Medical History:   Diagnosis Date    Arthritis January 2010    Daytime sleepiness     Disorder of thyroid     Hemorrhagic disorder (HCC)     Blood thinners    High cholesterol     Hyperlipidemia     Hypertension     Obesity     Paroxysmal atrial fibrillation (HCC)     Postoperative pain 12/27/2022    Snoring     Unspecified cataract        Past Surgical History:  Past Surgical History:   Procedure Laterality Date    PB TOTAL KNEE ARTHROPLASTY Left 2/7/2025    Procedure: LEFT TOTAL KNEE ARTHROPLASTY;  Surgeon: Denton Weaver M.D.;  Location: SURGERY [facility];  Service: Orthopedics    VT LAP ALLEN RESTRICT PROC LONGITUDINAL GAS* N/A 12/27/2022    Procedure: LAPAROSCOPIC SLEEVE GASTRECTOMY;  Surgeon: Aubrey Barrera M.D.;  Location: SURGERY [facility];  Service: General    CATARACT PHACO WITH IOL  04/20/2015    Performed by Jakob Garcia M.D. at SURGERY [facility]    CATARACT PHACO WITH IOL  04/01/2015    Performed by Jakob Garcia M.D. at SURGERY SAME DAY [facility]    DUPUYTREN CONTRACTURE RELEASE  07/17/2012    Performed by RAFAEL CALVILLO at SURGERY [facility]    HAND SURGERY      OTHER ABDOMINAL SURGERY  December 2022    Gastric sleeve    TONSILLECTOMY         Family History:  Family History   Problem Relation Age of Onset    Other Mother 68        kidney CA    Heart Disease Mother         CHF at 86    Cancer Mother        Social History:  Social History      Socioeconomic History    Marital status:      Spouse name: Not on file    Number of children: Not on file    Years of education: Not on file    Highest education level: 12th grade   Occupational History    Not on file   Tobacco Use    Smoking status: Former     Current packs/day: 0.00     Average packs/day: 1 pack/day for 5.0 years (5.0 ttl pk-yrs)     Types: Cigarettes     Start date: 1979     Quit date: 1984     Years since quittin.7    Smokeless tobacco: Former     Types: Chew     Quit date: 1984   Vaping Use    Vaping status: Never Used   Substance and Sexual Activity    Alcohol use: Not Currently     Comment: rarely    Drug use: No    Sexual activity: Yes     Partners: Female     Birth control/protection: None   Other Topics Concern    Not on file   Social History Narrative    Not on file     Social Drivers of Health     Financial Resource Strain: Low Risk  (2024)    Overall Financial Resource Strain (CARDIA)     Difficulty of Paying Living Expenses: Not hard at all   Food Insecurity: No Food Insecurity (2024)    Hunger Vital Sign     Worried About Running Out of Food in the Last Year: Never true     Ran Out of Food in the Last Year: Never true   Transportation Needs: No Transportation Needs (2024)    PRAPARE - Transportation     Lack of Transportation (Medical): No     Lack of Transportation (Non-Medical): No   Physical Activity: Inactive (2024)    Exercise Vital Sign     Days of Exercise per Week: 0 days     Minutes of Exercise per Session: 0 min   Stress: No Stress Concern Present (2024)    [employer] - Occupational Stress Questionnaire     Feeling of Stress : Not at all   Social Connections: Feeling Socially Integrated (2025)    OASIS : Social Isolation     Frequency of experiencing loneliness or isolation: Never   Intimate Partner Violence: Not on file   Housing Stability: Low Risk  (2024)    Housing  "Stability Vital Sign     Unable to Pay for Housing in the Last Year: No     Number of Times Moved in the Last Year: 0     Homeless in the Last Year: No       Medications:  Current Outpatient Medications   Medication Sig Dispense Refill    HYDROcodone-acetaminophen (NORCO) 5-325 MG Tab per tablet Take 1 tablet by mouth every 4 hours as needed for 7 days, for post operative pain. 42 Tablet 0    B Complex-Biotin-FA (COMPLEX B-100 PO) 1 chewable per day      multivitamin Tab Take 1 Tablet by mouth every day. Indications: Vitamin Deficiency      pravastatin (PRAVACHOL) 20 MG Tab Take 1 Tablet by mouth every evening. 100 Tablet 3    propafenone (RYTHMOL) 150 MG Tab Take 1 Tablet by mouth 2 times a day. 200 Tablet 6    polyethylene glycol-electrolytes (GOLYTELY) 236 GM [employer]n Follow instructions on [employer] or call [phone] with questions 4000 mL 0    apixaban (ELIQUIS) 5mg Tab TAKE ONE TABLET BY MOUTH TWO TIMES A DAY. 180 Tablet 3    levothyroxine (SYNTHROID) 200 MCG Tab Take 1 Tablet by mouth every morning. 90 Tablet 3    B Complex Vitamins (VITAMIN-B COMPLEX PO)       VITAMIN D PO Take  by mouth. Indications: Degenerative Arthritis of Knee      CALCIUM PO Take  by mouth. Indications: Joint Damage causing Pain and Loss of Function      omeprazole (PRILOSEC) 20 MG delayed-release capsule Take 1 Capsule by mouth every day. Open capsule and mix in 10cc H2O 30 Capsule 11     No current facility-administered medications for this visit.       Allergies:  Allergies   Allergen Reactions    Lipitor [Atorvastatin]      \"Triggered A fib\"       Review of Systems:  Constitutional: Negative for fever, chills and malaise/fatigue.   HENT: Negative for congestion.    Eyes: Negative for pain.   Respiratory: Negative for cough and shortness of breath.    Cardiovascular: Negative for leg swelling.   Gastrointestinal: Negative for nausea, vomiting, abdominal pain and diarrhea.   Genitourinary: Negative for dysuria and " hematuria.   Skin: Negative for rash.   Neurological: Negative for dizziness, focal weakness and headaches.   Endo/Heme/Allergies: Does not bruise/bleed easily.   Psychiatric/Behavioral: Negative for depression.  The patient is not nervous/anxious.        Physical Exam:  There were no vitals filed for this visit.    GENERAL: well appearing, well nourished, NAD  RESP: respiratory effort normal  ABDOMEN: soft, nontender, nondistended, no masses or organomegaly  HERNIAS: no hernias found on exam  SKIN/LYMPH: normal coloration and turgor, no suspicious skin lesions noted  NEURO/PSYCH: alert, oriented, normal speech, no focal findings or movement disorder noted  EXTREMITIES: no pedal edema noted      Data Review:    Labs:  POCT UA   Lab Results   Component Value Date/Time    POCCOLOR yellow 04/11/2024 09:44 AM    POCAPPEAR clear 04/11/2024 09:44 AM    POCLEUKEST neg 04/11/2024 09:44 AM    POCNITRITE neg 04/11/2024 09:44 AM    POCUROBILIGE 0.2 04/11/2024 09:44 AM    POCPROTEIN neg 04/11/2024 09:44 AM    POCURPH 5.5 04/11/2024 09:44 AM    POCBLOOD trace 04/11/2024 09:44 AM    POCSPGRV 1.020 04/11/2024 09:44 AM    POCKETONES neg 04/11/2024 09:44 AM    POCBILIRUBIN neg 04/11/2024 09:44 AM    POCGLUCUA neg 04/11/2024 09:44 AM      CBC   Lab Results   Component Value Date/Time    WBC 8.3 01/17/2025 0847    RBC 5.32 01/17/2025 0847    HEMOGLOBIN 16.9 01/17/2025 0847    HEMATOCRIT 51.0 01/17/2025 0847    MCV 95.9 01/17/2025 0847    MCH 31.8 01/17/2025 0847    MCHC 33.1 01/17/2025 0847    RDW 43.8 01/17/2025 0847    MPV 13.1 (H) 01/17/2025 0847    LYMPHOCYTES 30.40 11/21/2024 0611    LYMPHS 1.27 11/21/2024 0611    MONOCYTES 6.70 11/21/2024 0611    MONOS 0.28 11/21/2024 0611    EOSINOPHILS 3.30 11/21/2024 0611    EOS 0.14 11/21/2024 0611    BASOPHILS 1.90 (H) 11/21/2024 0611    BASO 0.08 11/21/2024 0611    NRBC 0.00 11/21/2024 0611       CMP   Lab Results   Component Value Date/Time    SODIUM 143 01/17/2025 0847    POTASSIUM 4.0  01/17/2025 0847    CHLORIDE 105 01/17/2025 0847    CO2 27 01/17/2025 0847    ANION 11.0 01/17/2025 0847    GLUCOSE 86 01/17/2025 0847    BUN 18 01/17/2025 0847    CREATININE 0.64 01/17/2025 0847    GFRCKD 102 01/17/2025 0847    CALCIUM 9.3 01/17/2025 0847    CORRCALC 9.0 11/21/2024 0611    ASTSGOT 30 11/21/2024 0611    ALTSGPT 29 11/21/2024 0611    ALKPHOSPHAT 54 11/21/2024 0611    TBILIRUBIN 0.7 11/21/2024 0611    ALBUMIN 4.2 11/21/2024 0611    TOTPROTEIN 6.9 11/21/2024 0611    GLOBULIN 2.7 11/21/2024 0611    AGRATIO 1.6 11/21/2024 0611       Imaging:   [id number] VIEW  Order: [id number]   Status: Final result       Next appt: 08/18/2025 at 08:15 AM in Cardiology (Ned Vega, A.P.R.N.)       Dx: Renal stones    Test Result Released: Yes (seen)    0 Result Notes  Details    Reading Physician Reading Date Result Priority   Norm Ribeiro M.D.  [phone]     4/9/2025      Narrative & Impression     4/9/2025 10:22 AM     HISTORY/REASON FOR EXAM:  monitoring left renal stone.        TECHNIQUE/EXAM DESCRIPTION AND NUMBER OF VIEWS:  3 views of the abdomen.     COMPARISON: 9/16/2024     FINDINGS:  7.3 mm stone within the mid left kidney similar to previous examination.  No right renal collecting system stones identified.     Left pelvic calcification unchanged and likely vascular.     Nonspecific bowel gas pattern.     IMPRESSION:     7.3 mm stone left kidney similar to previous.       Assessment: 69 y.o.male with a history of nephrolithiasis and painless gross hematuria status post negative workup earlier this year. He currently has a non-obstructive left renal stone (6.9 mm based on CT, reported as 5 mm on recent KUB) without symptoms. We discussed the chances of passing the stone if it were to move into the ureter based on the approximately 7 mm size, and potential management with left ureteroscopy and laser lithotripsy. He would prefer to continue to avoid surgery at this time (he's been busy with a number of  other medical issues and procedures), but if the stone grows he'll consider ureteroscopy. In the meantime, he'll continue to stay very well hydrated and limit dietary risks for stones.       Plan:    -Repeat KUB in 6 months  -Will call or message with result, and if remains stable will follow up in April 2026  -Mr. Villarreal will notify me of any flank pain or hematuria prior to next imaging and/or visit    Yaw Chand MD

## 2025-05-20 ENCOUNTER — PATIENT MESSAGE (OUTPATIENT)
Dept: CARDIOLOGY | Facility: MEDICAL CENTER | Age: 70
End: 2025-05-20
Payer: MEDICARE

## 2025-05-20 DIAGNOSIS — I48.0 PAF (PAROXYSMAL ATRIAL FIBRILLATION) (HCC): ICD-10-CM

## 2025-05-20 NOTE — TELEPHONE ENCOUNTER
MT- Please advise on patient message below, stating he has been having increased afib episodes. Longest being 3 hours. HR goes up to 155-186. Has no recent BP. States he is lightheaded with these episodes. Asking if his dose of Propafenone needs to be increased. Has been taking all meds as prescribed. Thank you.

## 2025-05-21 RX ORDER — PROPAFENONE HYDROCHLORIDE 150 MG/1
150 TABLET, COATED ORAL
Qty: 300 TABLET | Refills: 0 | Status: SHIPPED | OUTPATIENT
Start: 2025-05-21

## 2025-06-25 PROCEDURE — RXMED WILLOW AMBULATORY MEDICATION CHARGE

## 2025-06-30 ENCOUNTER — PHARMACY VISIT (OUTPATIENT)
Dept: PHARMACY | Facility: MEDICAL CENTER | Age: 70
End: 2025-06-30
Payer: COMMERCIAL

## 2025-07-01 PROCEDURE — RXMED WILLOW AMBULATORY MEDICATION CHARGE

## 2025-07-07 ENCOUNTER — PHARMACY VISIT (OUTPATIENT)
Dept: PHARMACY | Facility: MEDICAL CENTER | Age: 70
End: 2025-07-07
Payer: COMMERCIAL

## 2025-08-08 ENCOUNTER — TELEPHONE (OUTPATIENT)
Dept: CARDIOLOGY | Facility: MEDICAL CENTER | Age: 70
End: 2025-08-08
Payer: MEDICARE

## 2025-08-14 ENCOUNTER — HOSPITAL ENCOUNTER (OUTPATIENT)
Dept: LAB | Facility: MEDICAL CENTER | Age: 70
End: 2025-08-14
Attending: NURSE PRACTITIONER
Payer: MEDICARE

## 2025-08-14 DIAGNOSIS — E78.2 MIXED HYPERLIPIDEMIA: ICD-10-CM

## 2025-08-14 DIAGNOSIS — I48.0 PAF (PAROXYSMAL ATRIAL FIBRILLATION) (HCC): ICD-10-CM

## 2025-08-14 LAB
ANION GAP SERPL CALC-SCNC: 13 MMOL/L (ref 7–16)
BUN SERPL-MCNC: 12 MG/DL (ref 8–22)
CALCIUM SERPL-MCNC: 9.4 MG/DL (ref 8.5–10.5)
CHLORIDE SERPL-SCNC: 106 MMOL/L (ref 96–112)
CHOLEST SERPL-MCNC: 165 MG/DL (ref 100–199)
CO2 SERPL-SCNC: 25 MMOL/L (ref 20–33)
CREAT SERPL-MCNC: 0.85 MG/DL (ref 0.5–1.4)
GFR SERPLBLD CREATININE-BSD FMLA CKD-EPI: 93 ML/MIN/1.73 M 2
GLUCOSE SERPL-MCNC: 89 MG/DL (ref 65–99)
HDLC SERPL-MCNC: 42 MG/DL
LDLC SERPL CALC-MCNC: 91 MG/DL
POTASSIUM SERPL-SCNC: 3.4 MMOL/L (ref 3.6–5.5)
SODIUM SERPL-SCNC: 144 MMOL/L (ref 135–145)
TRIGL SERPL-MCNC: 158 MG/DL (ref 0–149)

## 2025-08-14 PROCEDURE — 80061 LIPID PANEL: CPT

## 2025-08-14 PROCEDURE — 80048 BASIC METABOLIC PNL TOTAL CA: CPT

## 2025-08-14 PROCEDURE — 36415 COLL VENOUS BLD VENIPUNCTURE: CPT

## 2025-08-15 ENCOUNTER — RESULTS FOLLOW-UP (OUTPATIENT)
Dept: CARDIOLOGY | Facility: MEDICAL CENTER | Age: 70
End: 2025-08-15
Payer: MEDICARE

## 2025-08-18 ENCOUNTER — OFFICE VISIT (OUTPATIENT)
Dept: CARDIOLOGY | Facility: MEDICAL CENTER | Age: 70
End: 2025-08-18
Attending: NURSE PRACTITIONER
Payer: MEDICARE

## 2025-08-18 VITALS
SYSTOLIC BLOOD PRESSURE: 120 MMHG | HEART RATE: 80 BPM | WEIGHT: 220 LBS | BODY MASS INDEX: 31.5 KG/M2 | HEIGHT: 70 IN | RESPIRATION RATE: 16 BRPM | DIASTOLIC BLOOD PRESSURE: 78 MMHG | OXYGEN SATURATION: 97 %

## 2025-08-18 DIAGNOSIS — E03.9 ACQUIRED HYPOTHYROIDISM: ICD-10-CM

## 2025-08-18 DIAGNOSIS — I48.0 PAF (PAROXYSMAL ATRIAL FIBRILLATION) (HCC): ICD-10-CM

## 2025-08-18 DIAGNOSIS — E78.2 MIXED HYPERLIPIDEMIA: Primary | ICD-10-CM

## 2025-08-18 LAB — EKG IMPRESSION: NORMAL

## 2025-08-18 PROCEDURE — 93005 ELECTROCARDIOGRAM TRACING: CPT | Mod: TC | Performed by: NURSE PRACTITIONER

## 2025-08-18 PROCEDURE — 3074F SYST BP LT 130 MM HG: CPT | Performed by: NURSE PRACTITIONER

## 2025-08-18 PROCEDURE — 99214 OFFICE O/P EST MOD 30 MIN: CPT | Performed by: NURSE PRACTITIONER

## 2025-08-18 PROCEDURE — 3078F DIAST BP <80 MM HG: CPT | Performed by: NURSE PRACTITIONER

## 2025-08-18 PROCEDURE — 99212 OFFICE O/P EST SF 10 MIN: CPT | Performed by: NURSE PRACTITIONER

## 2025-08-18 RX ORDER — PROPAFENONE HYDROCHLORIDE 150 MG/1
150 TABLET, COATED ORAL
Qty: 300 TABLET | Refills: 3 | Status: SHIPPED | OUTPATIENT
Start: 2025-08-18

## 2025-08-18 RX ORDER — LEVOTHYROXINE SODIUM 200 UG/1
200 TABLET ORAL EVERY MORNING
Qty: 90 TABLET | Refills: 3 | Status: SHIPPED | OUTPATIENT
Start: 2025-08-18

## 2025-08-18 RX ORDER — PRAVASTATIN SODIUM 20 MG
20 TABLET ORAL NIGHTLY
Qty: 100 TABLET | Refills: 3 | Status: SHIPPED | OUTPATIENT
Start: 2025-08-18

## 2025-08-18 RX ORDER — PROPAFENONE HYDROCHLORIDE 150 MG/1
150 TABLET, COATED ORAL
Qty: 300 TABLET | Refills: 1 | Status: SHIPPED | OUTPATIENT
Start: 2025-08-18 | End: 2025-08-18

## 2025-08-18 ASSESSMENT — ENCOUNTER SYMPTOMS
DEPRESSION: 0
CONSTITUTIONAL NEGATIVE: 1
BRUISES/BLEEDS EASILY: 0
SHORTNESS OF BREATH: 0
SENSORY CHANGE: 0
EYES NEGATIVE: 1
PALPITATIONS: 0
NERVOUS/ANXIOUS: 0
RESPIRATORY NEGATIVE: 1
CLAUDICATION: 0
PND: 0
ORTHOPNEA: 0
WHEEZING: 0
NAUSEA: 0
HALLUCINATIONS: 0
DIZZINESS: 0
GASTROINTESTINAL NEGATIVE: 1
NEUROLOGICAL NEGATIVE: 1
MUSCULOSKELETAL NEGATIVE: 1

## 2025-08-18 ASSESSMENT — FIBROSIS 4 INDEX: FIB4 SCORE: 2.34

## (undated) DEVICE — SYSTEM CALIBARATION  GASTRECTOMY 40FR WITH BULB (5/BX)

## (undated) DEVICE — CLOSURE SKIN STRIP 1/2 X 4 IN - (STERI STRIP) (50/BX 4BX/CA)

## (undated) DEVICE — DERMABOND ADVANCED - (12EA/BX)

## (undated) DEVICE — SET EXTENSION WITH 2 PORTS (48EA/CA) ***PART #2C8610 IS A SUBSTITUTE*****

## (undated) DEVICE — SET SUCTION/IRRIGATION WITH DISPOSABLE TIP (6/CA )PART #0250-070-520 IS A SUB

## (undated) DEVICE — COVER LIGHT HANDLE ALC PLUS DISP (18EA/BX)

## (undated) DEVICE — BAG SPONGE COUNT 10.25 X 32 - BLUE (250/CA)

## (undated) DEVICE — Device

## (undated) DEVICE — DRESSING ABDOMINAL PAD STERILE 8 X 10" (360EA/CA)"

## (undated) DEVICE — BLADE SURGICAL #15 - (50/BX 3BX/CA)

## (undated) DEVICE — DRESSING TEGADERM 8 X 12 - (10/BX 8BX/CA)

## (undated) DEVICE — CANNULA W/SEAL 5X100 Z-THRE - ADED KII (12/BX)

## (undated) DEVICE — GOWN WARMING X-LARGE FLEX - (20/CA)

## (undated) DEVICE — SET LEADWIRE 5 LEAD BEDSIDE DISPOSABLE ECG (1SET OF 5/EA)

## (undated) DEVICE — CANISTER SUCTION RIGID RED 1500CC (40EA/CA)

## (undated) DEVICE — GLOVE BIOGEL INDICATOR SZ 7SURGICAL PF LTX - (50/BX 4BX/CA)

## (undated) DEVICE — SYSTEM CLEARIFY VISUALIZATION (10EA/PK)

## (undated) DEVICE — SUCTION INSTRUMENT YANKAUER BULBOUS TIP W/O VENT (50EA/CA)

## (undated) DEVICE — UNIT NAVIGATION ORTHALIGN PLUS

## (undated) DEVICE — CANISTER SUCTION 3000ML MECHANICAL FILTER AUTO SHUTOFF MEDI-VAC NONSTERILE LF DISP  (40EA/CA)

## (undated) DEVICE — LACTATED RINGERS INJ 1000 ML - (14EA/CA 60CA/PF)

## (undated) DEVICE — BLADE 90X18X1.27MM SAW SAGITTAL

## (undated) DEVICE — SUTURE 4-0 MONOCRYL PLUS PS-2 - 27 INCH (36/BX)

## (undated) DEVICE — TOWEL STOP TIMEOUT SAFETY FLAG (40EA/CA)

## (undated) DEVICE — TIP INTPLS HFLO ML ORFC BTRY - (12/CS) FOR SURGILAV

## (undated) DEVICE — SODIUM CHL IRRIGATION 0.9% 1000ML (12EA/CA)

## (undated) DEVICE — SUTURE 2-0 VICRYL PLUS CT-1 - 8 X 18 INCH(12/BX)

## (undated) DEVICE — GOWN WARMING STANDARD FLEX - (30/CA)

## (undated) DEVICE — GLOVE BIOGEL SZ 8 SURGICAL PF LTX - (50PR/BX 4BX/CA)

## (undated) DEVICE — RELOAD ENDO GIA 60 ART MEDIUM THICK(6EA/CA)

## (undated) DEVICE — SLEEVE, VASO, REPROC, LARGE

## (undated) DEVICE — TUBE CONNECTING SUCTION - CLEAR PLASTIC STERILE 72 IN (50EA/CA)

## (undated) DEVICE — GOWN SURGEONS X-LARGE - DISP. (30/CA)

## (undated) DEVICE — ELECTRODE DUAL RETURN W/ CORD - (50/PK)

## (undated) DEVICE — CHLORAPREP 26 ML APPLICATOR - ORANGE TINT(25/CA)

## (undated) DEVICE — SENSOR OXIMETER ADULT SPO2 RD SET (20EA/BX)

## (undated) DEVICE — GLOVE SZ 7.5 BIOGEL PI MICRO - PF LF (50PR/BX)

## (undated) DEVICE — PEN SKIN MARKER W/RULER - (50EA/BX)

## (undated) DEVICE — TUBING CLEARLINK DUO-VENT - C-FLO (48EA/CA)

## (undated) DEVICE — TUBE NG SALEM SUMP 16FR (50EA/CA)

## (undated) DEVICE — GLOVE BIOGEL SZ 8.5 SURGICAL PF LTX - (50PR/BX 4BX/CA)

## (undated) DEVICE — SUTURE 3.5-0 ETHIBOND GREEN CT-2 TAPER (36PK/BX)

## (undated) DEVICE — GLOVE BIOGEL INDICATOR SZ 8 SURGICAL PF LTX - (50/BX 4BX/CA)

## (undated) DEVICE — BLADE SAW RECIPROCATING DOUBLE SIDED 70MM X 12.5MM X 0.64MM STERILE (1EA)

## (undated) DEVICE — TROCAR SEPARATOR 15MMZTHREAD - (6/BX)

## (undated) DEVICE — PACK GASTRIC BANDING OR - (1/CA)

## (undated) DEVICE — HUMID-VENT HEAT AND MOISTURE EXCHANGE- (50/BX)

## (undated) DEVICE — SET TUBING PNEUMOCLEAR HIGH FLOW SMOKE EVACUATION (10EA/BX)

## (undated) DEVICE — SHELL STAPLING POWER FOR SIGNIA HANDLE STAPLING SYS(6EA/PK)

## (undated) DEVICE — GLOVE BIOGEL SZ 7 SURGICAL PF LTX - (50PR/BX 4BX/CA)

## (undated) DEVICE — TROCAR Z THREAD12MM OPTICAL - NON BLADED (6/BX)

## (undated) DEVICE — TUBING LAPAROSCOPIC PLUME DEVICE (10EA/CA)

## (undated) DEVICE — GLOVE SIZE 7.0 SURGEON ACCELERATOR FREE GREEN (50PR/BX 4BX/CA)

## (undated) DEVICE — SUTURE 2-0 ETHIBOND GREEN CT-2 TAPER (36PK/BX)

## (undated) DEVICE — HANDPIECE THUNDERBEAT 5MM 45CM FRONT GRIP TYPE S (5EA/BX)

## (undated) DEVICE — SUTURE GENERAL

## (undated) DEVICE — SODIUM CHL. IRRIGATION 0.9% 3000ML (4EA/CA 65CA/PF)

## (undated) DEVICE — PACK TOTAL KNEE (1/CA)

## (undated) DEVICE — PIN FIXATION ATTUNE THREAD PACK HEAD HEADLESS

## (undated) DEVICE — TROCAR 5X100 NON BLADED Z-TH - READ KII (6/BX)

## (undated) DEVICE — CLOSURE PRINEO SKIN - (2EA/BX)

## (undated) DEVICE — GLOVE BIOGEL PI INDICATOR SZ 8.0 SURGICAL PF LF -(50/BX 4BX/CA)

## (undated) DEVICE — ENDO GIA 45 ART MEDIUM THICK RELOADS (6EA/CA)

## (undated) DEVICE — SLEEVE, VASO, THIGH, MED